# Patient Record
Sex: MALE | Race: OTHER | Employment: OTHER | URBAN - METROPOLITAN AREA
[De-identification: names, ages, dates, MRNs, and addresses within clinical notes are randomized per-mention and may not be internally consistent; named-entity substitution may affect disease eponyms.]

---

## 2024-01-08 ENCOUNTER — HOSPITAL ENCOUNTER (INPATIENT)
Age: 69
LOS: 22 days | Discharge: NURSING, REHAB OR LONG-TERM CARE | DRG: 871 | End: 2024-01-30
Attending: EMERGENCY MEDICINE | Admitting: INTERNAL MEDICINE
Payer: OTHER GOVERNMENT

## 2024-01-08 ENCOUNTER — APPOINTMENT (OUTPATIENT)
Dept: GENERAL RADIOLOGY | Age: 69
DRG: 871 | End: 2024-01-08
Payer: OTHER GOVERNMENT

## 2024-01-08 ENCOUNTER — APPOINTMENT (OUTPATIENT)
Dept: CT IMAGING | Age: 69
DRG: 871 | End: 2024-01-08
Payer: OTHER GOVERNMENT

## 2024-01-08 DIAGNOSIS — G93.41 ACUTE METABOLIC ENCEPHALOPATHY: Primary | ICD-10-CM

## 2024-01-08 DIAGNOSIS — J10.1 INFLUENZA A: ICD-10-CM

## 2024-01-08 DIAGNOSIS — N39.0 URINARY TRACT INFECTION WITHOUT HEMATURIA, SITE UNSPECIFIED: ICD-10-CM

## 2024-01-08 DIAGNOSIS — R11.2 NAUSEA AND VOMITING, UNSPECIFIED VOMITING TYPE: ICD-10-CM

## 2024-01-08 PROBLEM — R41.82 ALTERED MENTAL STATUS: Status: ACTIVE | Noted: 2024-01-08

## 2024-01-08 LAB
ALBUMIN SERPL-MCNC: 3.8 G/DL (ref 3.5–5.2)
ALP SERPL-CCNC: 165 U/L (ref 40–129)
ALT SERPL-CCNC: 16 U/L (ref 0–40)
ANION GAP SERPL CALCULATED.3IONS-SCNC: 18 MMOL/L (ref 7–16)
ANION GAP SERPL CALCULATED.3IONS-SCNC: 9 MMOL/L (ref 7–16)
AST SERPL-CCNC: 24 U/L (ref 0–39)
B.E.: -0.5 MMOL/L (ref -3–3)
BACTERIA URNS QL MICRO: ABNORMAL
BASOPHILS # BLD: 0.02 K/UL (ref 0–0.2)
BASOPHILS NFR BLD: 0 % (ref 0–2)
BILIRUB DIRECT SERPL-MCNC: <0.2 MG/DL (ref 0–0.3)
BILIRUB INDIRECT SERPL-MCNC: ABNORMAL MG/DL (ref 0–1)
BILIRUB SERPL-MCNC: 0.3 MG/DL (ref 0–1.2)
BILIRUB UR QL STRIP: NEGATIVE
BUN SERPL-MCNC: 13 MG/DL (ref 6–23)
BUN SERPL-MCNC: 18 MG/DL (ref 6–23)
CALCIUM SERPL-MCNC: 8 MG/DL (ref 8.6–10.2)
CALCIUM SERPL-MCNC: 8.7 MG/DL (ref 8.6–10.2)
CHLORIDE SERPL-SCNC: 105 MMOL/L (ref 98–107)
CHLORIDE SERPL-SCNC: 98 MMOL/L (ref 98–107)
CLARITY UR: ABNORMAL
CO2 SERPL-SCNC: 19 MMOL/L (ref 22–29)
CO2 SERPL-SCNC: 23 MMOL/L (ref 22–29)
COHB: 0.9 % (ref 0–1.5)
COLOR UR: YELLOW
CREAT SERPL-MCNC: 1 MG/DL (ref 0.7–1.2)
CREAT SERPL-MCNC: 1.5 MG/DL (ref 0.7–1.2)
CRITICAL: ABNORMAL
CRP SERPL HS-MCNC: 67 MG/L (ref 0–5)
DATE ANALYZED: ABNORMAL
DATE OF COLLECTION: ABNORMAL
EKG ATRIAL RATE: 107 BPM
EKG P AXIS: 38 DEGREES
EKG P-R INTERVAL: 138 MS
EKG Q-T INTERVAL: 314 MS
EKG QRS DURATION: 72 MS
EKG QTC CALCULATION (BAZETT): 419 MS
EKG R AXIS: 37 DEGREES
EKG T AXIS: 21 DEGREES
EKG VENTRICULAR RATE: 107 BPM
EOSINOPHIL # BLD: 0.01 K/UL (ref 0.05–0.5)
EOSINOPHILS RELATIVE PERCENT: 0 % (ref 0–6)
ERYTHROCYTE [DISTWIDTH] IN BLOOD BY AUTOMATED COUNT: 13.7 % (ref 11.5–15)
GFR SERPL CREATININE-BSD FRML MDRD: 51 ML/MIN/1.73M2
GFR SERPL CREATININE-BSD FRML MDRD: >60 ML/MIN/1.73M2
GLUCOSE BLD-MCNC: 115 MG/DL (ref 74–99)
GLUCOSE BLD-MCNC: 131 MG/DL (ref 74–99)
GLUCOSE BLD-MCNC: 132 MG/DL (ref 74–99)
GLUCOSE BLD-MCNC: 154 MG/DL (ref 74–99)
GLUCOSE SERPL-MCNC: 126 MG/DL (ref 74–99)
GLUCOSE SERPL-MCNC: 166 MG/DL (ref 74–99)
GLUCOSE UR STRIP-MCNC: NEGATIVE MG/DL
HBA1C MFR BLD: 6.3 % (ref 4–5.6)
HCO3: 21.7 MMOL/L (ref 22–26)
HCT VFR BLD AUTO: 43 % (ref 37–54)
HGB BLD-MCNC: 14.7 G/DL (ref 12.5–16.5)
HGB UR QL STRIP.AUTO: ABNORMAL
HHB: 5.4 % (ref 0–5)
IMM GRANULOCYTES # BLD AUTO: 0.03 K/UL (ref 0–0.58)
IMM GRANULOCYTES NFR BLD: 0 % (ref 0–5)
INFLUENZA A BY PCR: DETECTED
INFLUENZA B BY PCR: NOT DETECTED
KETONES UR STRIP-MCNC: 15 MG/DL
LAB: ABNORMAL
LACTATE BLDV-SCNC: 2 MMOL/L (ref 0.5–1.9)
LACTATE BLDV-SCNC: 4.8 MMOL/L (ref 0.5–1.9)
LEUKOCYTE ESTERASE UR QL STRIP: ABNORMAL
LIPASE SERPL-CCNC: 23 U/L (ref 13–60)
LYMPHOCYTES NFR BLD: 0.84 K/UL (ref 1.5–4)
LYMPHOCYTES RELATIVE PERCENT: 10 % (ref 20–42)
Lab: 212
MCH RBC QN AUTO: 31.3 PG (ref 26–35)
MCHC RBC AUTO-ENTMCNC: 34.2 G/DL (ref 32–34.5)
MCV RBC AUTO: 91.7 FL (ref 80–99.9)
METHB: 0.1 % (ref 0–1.5)
MODE: ABNORMAL
MONOCYTES NFR BLD: 0.86 K/UL (ref 0.1–0.95)
MONOCYTES NFR BLD: 10 % (ref 2–12)
NEUTROPHILS NFR BLD: 80 % (ref 43–80)
NEUTS SEG NFR BLD: 6.96 K/UL (ref 1.8–7.3)
NITRITE UR QL STRIP: NEGATIVE
O2 CONTENT: 19.6 ML/DL
O2 SATURATION: 94.5 % (ref 92–98.5)
O2HB: 93.6 % (ref 94–97)
OPERATOR ID: 2323
PATIENT TEMP: 37 C
PCO2: 29.6 MMHG (ref 35–45)
PH BLOOD GAS: 7.48 (ref 7.35–7.45)
PH UR STRIP: 5.5 [PH] (ref 5–9)
PLATELET # BLD AUTO: 197 K/UL (ref 130–450)
PMV BLD AUTO: 10.3 FL (ref 7–12)
PO2: 67.1 MMHG (ref 75–100)
POTASSIUM SERPL-SCNC: 3.7 MMOL/L (ref 3.5–5)
POTASSIUM SERPL-SCNC: 3.8 MMOL/L (ref 3.5–5)
PROCALCITONIN SERPL-MCNC: 0.16 NG/ML (ref 0–0.08)
PROT SERPL-MCNC: 6.5 G/DL (ref 6.4–8.3)
PROT UR STRIP-MCNC: 100 MG/DL
RBC # BLD AUTO: 4.69 M/UL (ref 3.8–5.8)
RBC #/AREA URNS HPF: ABNORMAL /HPF
SARS-COV-2 RDRP RESP QL NAA+PROBE: NOT DETECTED
SODIUM SERPL-SCNC: 135 MMOL/L (ref 132–146)
SODIUM SERPL-SCNC: 137 MMOL/L (ref 132–146)
SOURCE, BLOOD GAS: ABNORMAL
SP GR UR STRIP: 1.02 (ref 1–1.03)
SPECIMEN DESCRIPTION: NORMAL
THB: 14.9 G/DL (ref 11.5–16.5)
TIME ANALYZED: 216
TROPONIN I SERPL HS-MCNC: 11 NG/L (ref 0–11)
TROPONIN I SERPL HS-MCNC: 13 NG/L (ref 0–11)
UROBILINOGEN UR STRIP-ACNC: 0.2 EU/DL (ref 0–1)
WBC #/AREA URNS HPF: ABNORMAL /HPF
WBC OTHER # BLD: 8.7 K/UL (ref 4.5–11.5)

## 2024-01-08 PROCEDURE — 86140 C-REACTIVE PROTEIN: CPT

## 2024-01-08 PROCEDURE — 94640 AIRWAY INHALATION TREATMENT: CPT

## 2024-01-08 PROCEDURE — 99285 EMERGENCY DEPT VISIT HI MDM: CPT

## 2024-01-08 PROCEDURE — 6370000000 HC RX 637 (ALT 250 FOR IP)

## 2024-01-08 PROCEDURE — 83690 ASSAY OF LIPASE: CPT

## 2024-01-08 PROCEDURE — 82805 BLOOD GASES W/O2 SATURATION: CPT

## 2024-01-08 PROCEDURE — 81001 URINALYSIS AUTO W/SCOPE: CPT

## 2024-01-08 PROCEDURE — 96374 THER/PROPH/DIAG INJ IV PUSH: CPT

## 2024-01-08 PROCEDURE — 6370000000 HC RX 637 (ALT 250 FOR IP): Performed by: INTERNAL MEDICINE

## 2024-01-08 PROCEDURE — 6360000002 HC RX W HCPCS: Performed by: INTERNAL MEDICINE

## 2024-01-08 PROCEDURE — 87086 URINE CULTURE/COLONY COUNT: CPT

## 2024-01-08 PROCEDURE — 93010 ELECTROCARDIOGRAM REPORT: CPT | Performed by: INTERNAL MEDICINE

## 2024-01-08 PROCEDURE — 2580000003 HC RX 258: Performed by: STUDENT IN AN ORGANIZED HEALTH CARE EDUCATION/TRAINING PROGRAM

## 2024-01-08 PROCEDURE — 85025 COMPLETE CBC W/AUTO DIFF WBC: CPT

## 2024-01-08 PROCEDURE — 96361 HYDRATE IV INFUSION ADD-ON: CPT

## 2024-01-08 PROCEDURE — 82248 BILIRUBIN DIRECT: CPT

## 2024-01-08 PROCEDURE — 83605 ASSAY OF LACTIC ACID: CPT

## 2024-01-08 PROCEDURE — 2060000000 HC ICU INTERMEDIATE R&B

## 2024-01-08 PROCEDURE — 36415 COLL VENOUS BLD VENIPUNCTURE: CPT

## 2024-01-08 PROCEDURE — 2700000000 HC OXYGEN THERAPY PER DAY

## 2024-01-08 PROCEDURE — 70450 CT HEAD/BRAIN W/O DYE: CPT

## 2024-01-08 PROCEDURE — 82962 GLUCOSE BLOOD TEST: CPT

## 2024-01-08 PROCEDURE — 80048 BASIC METABOLIC PNL TOTAL CA: CPT

## 2024-01-08 PROCEDURE — 87502 INFLUENZA DNA AMP PROBE: CPT

## 2024-01-08 PROCEDURE — 80053 COMPREHEN METABOLIC PANEL: CPT

## 2024-01-08 PROCEDURE — 84484 ASSAY OF TROPONIN QUANT: CPT

## 2024-01-08 PROCEDURE — 6370000000 HC RX 637 (ALT 250 FOR IP): Performed by: STUDENT IN AN ORGANIZED HEALTH CARE EDUCATION/TRAINING PROGRAM

## 2024-01-08 PROCEDURE — 51702 INSERT TEMP BLADDER CATH: CPT

## 2024-01-08 PROCEDURE — 87635 SARS-COV-2 COVID-19 AMP PRB: CPT

## 2024-01-08 PROCEDURE — 83036 HEMOGLOBIN GLYCOSYLATED A1C: CPT

## 2024-01-08 PROCEDURE — 84145 PROCALCITONIN (PCT): CPT

## 2024-01-08 PROCEDURE — 6360000002 HC RX W HCPCS: Performed by: STUDENT IN AN ORGANIZED HEALTH CARE EDUCATION/TRAINING PROGRAM

## 2024-01-08 PROCEDURE — 87040 BLOOD CULTURE FOR BACTERIA: CPT

## 2024-01-08 PROCEDURE — 93005 ELECTROCARDIOGRAM TRACING: CPT | Performed by: STUDENT IN AN ORGANIZED HEALTH CARE EDUCATION/TRAINING PROGRAM

## 2024-01-08 PROCEDURE — 71045 X-RAY EXAM CHEST 1 VIEW: CPT

## 2024-01-08 RX ORDER — CLONAZEPAM 0.5 MG/1
0.5 TABLET ORAL EVERY MORNING
Status: DISCONTINUED | OUTPATIENT
Start: 2024-01-09 | End: 2024-01-16 | Stop reason: SDUPTHER

## 2024-01-08 RX ORDER — PROCHLORPERAZINE EDISYLATE 5 MG/ML
10 INJECTION INTRAMUSCULAR; INTRAVENOUS EVERY 6 HOURS PRN
Status: DISCONTINUED | OUTPATIENT
Start: 2024-01-08 | End: 2024-01-25

## 2024-01-08 RX ORDER — ACETAMINOPHEN 325 MG/1
650 TABLET ORAL EVERY 4 HOURS PRN
Status: DISCONTINUED | OUTPATIENT
Start: 2024-01-08 | End: 2024-01-08 | Stop reason: SDUPTHER

## 2024-01-08 RX ORDER — BENZTROPINE MESYLATE 2 MG/1
2 TABLET ORAL 2 TIMES DAILY
COMMUNITY

## 2024-01-08 RX ORDER — ONDANSETRON 4 MG/1
4 TABLET, ORALLY DISINTEGRATING ORAL EVERY 8 HOURS PRN
Status: DISCONTINUED | OUTPATIENT
Start: 2024-01-08 | End: 2024-01-31 | Stop reason: HOSPADM

## 2024-01-08 RX ORDER — CLONAZEPAM 0.5 MG/1
0.5 TABLET ORAL SEE ADMIN INSTRUCTIONS
COMMUNITY

## 2024-01-08 RX ORDER — ACETAMINOPHEN 650 MG/1
650 SUPPOSITORY RECTAL ONCE
Status: COMPLETED | OUTPATIENT
Start: 2024-01-08 | End: 2024-01-08

## 2024-01-08 RX ORDER — SODIUM CHLORIDE 0.9 % (FLUSH) 0.9 %
5-40 SYRINGE (ML) INJECTION EVERY 12 HOURS SCHEDULED
Status: DISCONTINUED | OUTPATIENT
Start: 2024-01-08 | End: 2024-01-31 | Stop reason: HOSPADM

## 2024-01-08 RX ORDER — SODIUM CHLORIDE 0.9 % (FLUSH) 0.9 %
5-40 SYRINGE (ML) INJECTION PRN
Status: DISCONTINUED | OUTPATIENT
Start: 2024-01-08 | End: 2024-01-31 | Stop reason: HOSPADM

## 2024-01-08 RX ORDER — ONDANSETRON 2 MG/ML
4 INJECTION INTRAMUSCULAR; INTRAVENOUS EVERY 6 HOURS PRN
Status: DISCONTINUED | OUTPATIENT
Start: 2024-01-08 | End: 2024-01-31 | Stop reason: HOSPADM

## 2024-01-08 RX ORDER — SODIUM CHLORIDE 9 MG/ML
INJECTION, SOLUTION INTRAVENOUS PRN
Status: DISCONTINUED | OUTPATIENT
Start: 2024-01-08 | End: 2024-01-08 | Stop reason: SDUPTHER

## 2024-01-08 RX ORDER — SODIUM CHLORIDE AND POTASSIUM CHLORIDE 150; 900 MG/100ML; MG/100ML
INJECTION, SOLUTION INTRAVENOUS CONTINUOUS
Status: DISCONTINUED | OUTPATIENT
Start: 2024-01-08 | End: 2024-01-11

## 2024-01-08 RX ORDER — CLOZAPINE 100 MG/1
100 TABLET ORAL 2 TIMES DAILY
Status: DISCONTINUED | OUTPATIENT
Start: 2024-01-08 | End: 2024-01-08

## 2024-01-08 RX ORDER — BENZTROPINE MESYLATE 1 MG/1
2 TABLET ORAL 2 TIMES DAILY
Status: DISCONTINUED | OUTPATIENT
Start: 2024-01-08 | End: 2024-01-31 | Stop reason: HOSPADM

## 2024-01-08 RX ORDER — ACETAMINOPHEN 500 MG
500 TABLET ORAL EVERY 6 HOURS PRN
Status: DISCONTINUED | OUTPATIENT
Start: 2024-01-08 | End: 2024-01-31 | Stop reason: HOSPADM

## 2024-01-08 RX ORDER — IPRATROPIUM BROMIDE AND ALBUTEROL SULFATE 2.5; .5 MG/3ML; MG/3ML
1 SOLUTION RESPIRATORY (INHALATION)
Status: DISCONTINUED | OUTPATIENT
Start: 2024-01-08 | End: 2024-01-12

## 2024-01-08 RX ORDER — CLOZAPINE 100 MG/1
100 TABLET ORAL EVERY MORNING
Status: DISCONTINUED | OUTPATIENT
Start: 2024-01-09 | End: 2024-01-31 | Stop reason: HOSPADM

## 2024-01-08 RX ORDER — SODIUM CHLORIDE 9 MG/ML
INJECTION, SOLUTION INTRAVENOUS PRN
Status: DISCONTINUED | OUTPATIENT
Start: 2024-01-08 | End: 2024-01-31 | Stop reason: HOSPADM

## 2024-01-08 RX ORDER — INSULIN LISPRO 100 [IU]/ML
0-4 INJECTION, SOLUTION INTRAVENOUS; SUBCUTANEOUS NIGHTLY
Status: DISCONTINUED | OUTPATIENT
Start: 2024-01-08 | End: 2024-01-31 | Stop reason: HOSPADM

## 2024-01-08 RX ORDER — CLONAZEPAM 1 MG/1
0.5 TABLET ORAL 3 TIMES DAILY
Status: DISCONTINUED | OUTPATIENT
Start: 2024-01-08 | End: 2024-01-08

## 2024-01-08 RX ORDER — ENOXAPARIN SODIUM 100 MG/ML
40 INJECTION SUBCUTANEOUS DAILY
Status: DISCONTINUED | OUTPATIENT
Start: 2024-01-08 | End: 2024-01-31 | Stop reason: HOSPADM

## 2024-01-08 RX ORDER — CLONAZEPAM 0.5 MG/1
0.25 TABLET ORAL DAILY
Status: DISCONTINUED | OUTPATIENT
Start: 2024-01-09 | End: 2024-01-16 | Stop reason: SDUPTHER

## 2024-01-08 RX ORDER — BUDESONIDE 0.5 MG/2ML
500 INHALANT ORAL
Status: DISCONTINUED | OUTPATIENT
Start: 2024-01-08 | End: 2024-01-31 | Stop reason: HOSPADM

## 2024-01-08 RX ORDER — ACETAMINOPHEN 650 MG/1
SUPPOSITORY RECTAL
Status: COMPLETED
Start: 2024-01-08 | End: 2024-01-08

## 2024-01-08 RX ORDER — POLYETHYLENE GLYCOL 3350 17 G/17G
17 POWDER, FOR SOLUTION ORAL DAILY PRN
Status: DISCONTINUED | OUTPATIENT
Start: 2024-01-08 | End: 2024-01-31 | Stop reason: HOSPADM

## 2024-01-08 RX ORDER — 0.9 % SODIUM CHLORIDE 0.9 %
2000 INTRAVENOUS SOLUTION INTRAVENOUS ONCE
Status: COMPLETED | OUTPATIENT
Start: 2024-01-08 | End: 2024-01-08

## 2024-01-08 RX ORDER — CLOZAPINE 100 MG/1
200 TABLET ORAL NIGHTLY
Status: DISCONTINUED | OUTPATIENT
Start: 2024-01-08 | End: 2024-01-31 | Stop reason: HOSPADM

## 2024-01-08 RX ORDER — GLUCAGON 1 MG/ML
1 KIT INJECTION PRN
Status: DISCONTINUED | OUTPATIENT
Start: 2024-01-08 | End: 2024-01-31 | Stop reason: HOSPADM

## 2024-01-08 RX ORDER — INSULIN LISPRO 100 [IU]/ML
0-8 INJECTION, SOLUTION INTRAVENOUS; SUBCUTANEOUS
Status: DISCONTINUED | OUTPATIENT
Start: 2024-01-08 | End: 2024-01-31 | Stop reason: HOSPADM

## 2024-01-08 RX ORDER — CLOZAPINE 100 MG/1
100 TABLET ORAL SEE ADMIN INSTRUCTIONS
COMMUNITY

## 2024-01-08 RX ORDER — DEXTROSE MONOHYDRATE 100 MG/ML
INJECTION, SOLUTION INTRAVENOUS CONTINUOUS PRN
Status: DISCONTINUED | OUTPATIENT
Start: 2024-01-08 | End: 2024-01-31 | Stop reason: HOSPADM

## 2024-01-08 RX ORDER — ACETAMINOPHEN 650 MG/1
650 SUPPOSITORY RECTAL EVERY 6 HOURS PRN
Status: COMPLETED | OUTPATIENT
Start: 2024-01-08 | End: 2024-01-11

## 2024-01-08 RX ADMIN — WATER 1000 MG: 1 INJECTION INTRAMUSCULAR; INTRAVENOUS; SUBCUTANEOUS at 03:09

## 2024-01-08 RX ADMIN — POTASSIUM CHLORIDE AND SODIUM CHLORIDE: 900; 150 INJECTION, SOLUTION INTRAVENOUS at 15:04

## 2024-01-08 RX ADMIN — BUDESONIDE INHALATION 500 MCG: 0.5 SUSPENSION RESPIRATORY (INHALATION) at 20:21

## 2024-01-08 RX ADMIN — BENZTROPINE MESYLATE 2 MG: 1 TABLET ORAL at 21:15

## 2024-01-08 RX ADMIN — BENZTROPINE MESYLATE 2 MG: 1 TABLET ORAL at 15:05

## 2024-01-08 RX ADMIN — Medication 10 ML: at 21:20

## 2024-01-08 RX ADMIN — CLONAZEPAM 1.5 MG: 0.5 TABLET ORAL at 18:52

## 2024-01-08 RX ADMIN — IPRATROPIUM BROMIDE AND ALBUTEROL SULFATE 1 DOSE: .5; 2.5 SOLUTION RESPIRATORY (INHALATION) at 16:34

## 2024-01-08 RX ADMIN — IPRATROPIUM BROMIDE AND ALBUTEROL SULFATE 1 DOSE: .5; 2.5 SOLUTION RESPIRATORY (INHALATION) at 10:39

## 2024-01-08 RX ADMIN — CLOZAPINE 200 MG: 100 TABLET ORAL at 22:41

## 2024-01-08 RX ADMIN — ACETAMINOPHEN 650 MG: 650 SUPPOSITORY RECTAL at 03:09

## 2024-01-08 RX ADMIN — IPRATROPIUM BROMIDE AND ALBUTEROL SULFATE 1 DOSE: .5; 2.5 SOLUTION RESPIRATORY (INHALATION) at 20:21

## 2024-01-08 RX ADMIN — ACETAMINOPHEN 650 MG: 650 SUPPOSITORY RECTAL at 07:53

## 2024-01-08 RX ADMIN — ENOXAPARIN SODIUM 40 MG: 100 INJECTION SUBCUTANEOUS at 14:58

## 2024-01-08 RX ADMIN — BUDESONIDE INHALATION 500 MCG: 0.5 SUSPENSION RESPIRATORY (INHALATION) at 10:39

## 2024-01-08 RX ADMIN — SODIUM CHLORIDE 2000 ML: 9 INJECTION, SOLUTION INTRAVENOUS at 02:12

## 2024-01-08 RX ADMIN — POTASSIUM CHLORIDE AND SODIUM CHLORIDE: 900; 150 INJECTION, SOLUTION INTRAVENOUS at 23:40

## 2024-01-08 ASSESSMENT — PAIN - FUNCTIONAL ASSESSMENT: PAIN_FUNCTIONAL_ASSESSMENT: NONE - DENIES PAIN

## 2024-01-08 NOTE — H&P
Department of Internal Medicine        CHIEF COMPLAINT: Altered mental status    Reason for Admission: Acute metabolic encephalopathy with UTI    HISTORY OF PRESENT ILLNESS:      The patient is a 68 y.o. male who presents with altered mental status.  Patient according to family is usually able to get around by himself with family has noticed lately he is shuffling his feet and not able to walk as easily.  Family denies any falls or head trauma.  Family states patient denied any pain or problems just prior to this.  Paramedics were called and patient was found to be hypoxic with O2 sat 85%.  Patient does not have home O2.  In the ED the patient was noted to have elevated creatinine 1.5 with WBC 8.7 hemoglobin 14.7.  Patient was positive for influenza A with screening.  Urinalysis showed 21-50 WBCs and +3 bacteria and large leukocyte esterase.  Temperature was 99.5 initially heart rate 91 and blood pressure currently 118/68.  Patient's O2 sat is 96% on 3 L currently.  Chest x-ray and CT of the head without contrast were both unremarkable.  Currently the patient has his eyes closed and resists opening when trying to check the pupils.  When doing physical exam trying to move his arms and legs he would slap at me with his right arm.    Past Medical History:    History reviewed. No pertinent past medical history.  Past Surgical History:    History reviewed. No pertinent surgical history.    Medications Prior to Admission:    @  Prior to Admission medications    Medication Sig Start Date End Date Taking? Authorizing Provider   metFORMIN (GLUCOPHAGE) 500 MG tablet Take 1 tablet by mouth daily With dinner   Yes Leonardo Vences MD   clonazePAM (KLONOPIN) 0.5 MG tablet Take 1 tablet by mouth 3 times daily. One half tab in morning, one half tab at 2pm, 3 tablets at bedtime Max Daily Amount: 1.5 mg   Yes Leonardo Vences MD   benztropine (COGENTIN) 2 MG tablet Take 1 tablet by mouth 2 times daily One in morning one at

## 2024-01-08 NOTE — ED PROVIDER NOTES
[facility] EMERGENCY DEPARTMENT  EMERGENCY DEPARTMENT ENCOUNTER      Pt Name: Aureliano Falcon  MRN: [id number]  Birthdate 1955  Date of evaluation: 1/8/2024  Provider: Caesar Landry DO  PCP: Toshia Perea APRN - CNP  Note Started: 2:03 AM EST 1/8/24    CHIEF COMPLAINT       Chief Complaint   Patient presents with    Altered Mental Status       HISTORY OF PRESENT ILLNESS: 1 or more Elements   History From: EMS and patient's family  Limitations to history : Altered Mental Status and nonverbal    Aureliano Falcon is a 68 y.o. male who presents to the ED due to altered mental status.  Patient is nonverbal at baseline but reportedly has been less mobile than he typically is per family.  Patient is usually able to get around by himself but family states that he has been shuffling and not able to walk as easily as he typically does.  They deny any falls or trauma to the head.  They deny the patient has been complaining of any pain recently.  Denies any other symptoms at this time.  Patient was found to be hypoxic on room air upon arrival at 85% and placed on 3 L via nasal cannula.  He is not on any home oxygen.  History is limited given patient's nonverbal status.    Nursing Notes were all reviewed and agreed with or any disagreements were addressed in the HPI.    REVIEW OF SYSTEMS :    Positives and Pertinent negatives as per HPI.     PAST MEDICAL HISTORY/Chronic Conditions Affecting Care    has no past medical history on file.     SURGICAL HISTORY   History reviewed. No pertinent surgical history.    CURRENTMEDICATIONS       Previous Medications    BENZTROPINE (COGENTIN) 2 MG TABLET    Take 1 tablet by mouth 2 times daily One in morning one at night    CLONAZEPAM (KLONOPIN) 0.5 MG TABLET    Take 1 tablet by mouth 3 times daily. One half tab in morning, one half tab at 2pm, 3 tablets at bedtime Max Daily Amount: 1.5 mg    CLOZAPINE (CLOZARIL) 100 MG TABLET    Take 1 tablet by mouth 2 times daily

## 2024-01-09 LAB
ALBUMIN SERPL-MCNC: 3.1 G/DL (ref 3.5–5.2)
ALP SERPL-CCNC: 136 U/L (ref 40–129)
ALT SERPL-CCNC: 15 U/L (ref 0–40)
ANION GAP SERPL CALCULATED.3IONS-SCNC: 11 MMOL/L (ref 7–16)
AST SERPL-CCNC: 26 U/L (ref 0–39)
BASOPHILS # BLD: 0.01 K/UL (ref 0–0.2)
BASOPHILS NFR BLD: 0 % (ref 0–2)
BILIRUB SERPL-MCNC: 0.3 MG/DL (ref 0–1.2)
BUN SERPL-MCNC: 16 MG/DL (ref 6–23)
CALCIUM SERPL-MCNC: 8.1 MG/DL (ref 8.6–10.2)
CHLORIDE SERPL-SCNC: 107 MMOL/L (ref 98–107)
CHOLEST SERPL-MCNC: 101 MG/DL
CO2 SERPL-SCNC: 20 MMOL/L (ref 22–29)
CREAT SERPL-MCNC: 1 MG/DL (ref 0.7–1.2)
EOSINOPHIL # BLD: 0 K/UL (ref 0.05–0.5)
EOSINOPHILS RELATIVE PERCENT: 0 % (ref 0–6)
ERYTHROCYTE [DISTWIDTH] IN BLOOD BY AUTOMATED COUNT: 14.3 % (ref 11.5–15)
ERYTHROCYTE [SEDIMENTATION RATE] IN BLOOD BY WESTERGREN METHOD: 31 MM/HR (ref 0–15)
GFR SERPL CREATININE-BSD FRML MDRD: >60 ML/MIN/1.73M2
GLUCOSE BLD-MCNC: 115 MG/DL (ref 74–99)
GLUCOSE BLD-MCNC: 121 MG/DL (ref 74–99)
GLUCOSE BLD-MCNC: 130 MG/DL (ref 74–99)
GLUCOSE BLD-MCNC: 155 MG/DL (ref 74–99)
GLUCOSE SERPL-MCNC: 115 MG/DL (ref 74–99)
HCT VFR BLD AUTO: 41.7 % (ref 37–54)
HDLC SERPL-MCNC: 47 MG/DL
HGB BLD-MCNC: 13.7 G/DL (ref 12.5–16.5)
IMM GRANULOCYTES # BLD AUTO: 0.03 K/UL (ref 0–0.58)
IMM GRANULOCYTES NFR BLD: 0 % (ref 0–5)
LDLC SERPL CALC-MCNC: 39 MG/DL
LYMPHOCYTES NFR BLD: 0.76 K/UL (ref 1.5–4)
LYMPHOCYTES RELATIVE PERCENT: 8 % (ref 20–42)
MAGNESIUM SERPL-MCNC: 2.2 MG/DL (ref 1.6–2.6)
MCH RBC QN AUTO: 30.6 PG (ref 26–35)
MCHC RBC AUTO-ENTMCNC: 32.9 G/DL (ref 32–34.5)
MCV RBC AUTO: 93.3 FL (ref 80–99.9)
MONOCYTES NFR BLD: 0.85 K/UL (ref 0.1–0.95)
MONOCYTES NFR BLD: 9 % (ref 2–12)
NEUTROPHILS NFR BLD: 82 % (ref 43–80)
NEUTS SEG NFR BLD: 7.35 K/UL (ref 1.8–7.3)
PHOSPHATE SERPL-MCNC: 2.3 MG/DL (ref 2.5–4.5)
PLATELET # BLD AUTO: 170 K/UL (ref 130–450)
PMV BLD AUTO: 10.4 FL (ref 7–12)
POTASSIUM SERPL-SCNC: 4.1 MMOL/L (ref 3.5–5)
PROT SERPL-MCNC: 5.8 G/DL (ref 6.4–8.3)
RBC # BLD AUTO: 4.47 M/UL (ref 3.8–5.8)
SODIUM SERPL-SCNC: 138 MMOL/L (ref 132–146)
TRIGL SERPL-MCNC: 76 MG/DL
TSH SERPL DL<=0.05 MIU/L-ACNC: 3.24 UIU/ML (ref 0.27–4.2)
VLDLC SERPL CALC-MCNC: 15 MG/DL
WBC OTHER # BLD: 9 K/UL (ref 4.5–11.5)

## 2024-01-09 PROCEDURE — 6360000002 HC RX W HCPCS: Performed by: INTERNAL MEDICINE

## 2024-01-09 PROCEDURE — 85652 RBC SED RATE AUTOMATED: CPT

## 2024-01-09 PROCEDURE — 2580000003 HC RX 258: Performed by: INTERNAL MEDICINE

## 2024-01-09 PROCEDURE — 94640 AIRWAY INHALATION TREATMENT: CPT

## 2024-01-09 PROCEDURE — 80061 LIPID PANEL: CPT

## 2024-01-09 PROCEDURE — 6370000000 HC RX 637 (ALT 250 FOR IP): Performed by: INTERNAL MEDICINE

## 2024-01-09 PROCEDURE — 2060000000 HC ICU INTERMEDIATE R&B

## 2024-01-09 PROCEDURE — 85025 COMPLETE CBC W/AUTO DIFF WBC: CPT

## 2024-01-09 PROCEDURE — 80053 COMPREHEN METABOLIC PANEL: CPT

## 2024-01-09 PROCEDURE — 97161 PT EVAL LOW COMPLEX 20 MIN: CPT | Performed by: PHYSICAL THERAPIST

## 2024-01-09 PROCEDURE — 84443 ASSAY THYROID STIM HORMONE: CPT

## 2024-01-09 PROCEDURE — 36415 COLL VENOUS BLD VENIPUNCTURE: CPT

## 2024-01-09 PROCEDURE — 84100 ASSAY OF PHOSPHORUS: CPT

## 2024-01-09 PROCEDURE — 82962 GLUCOSE BLOOD TEST: CPT

## 2024-01-09 PROCEDURE — 2580000003 HC RX 258: Performed by: STUDENT IN AN ORGANIZED HEALTH CARE EDUCATION/TRAINING PROGRAM

## 2024-01-09 PROCEDURE — 2700000000 HC OXYGEN THERAPY PER DAY

## 2024-01-09 PROCEDURE — 83735 ASSAY OF MAGNESIUM: CPT

## 2024-01-09 PROCEDURE — 2500000003 HC RX 250 WO HCPCS: Performed by: INTERNAL MEDICINE

## 2024-01-09 RX ORDER — ZIPRASIDONE MESYLATE 20 MG/ML
5 INJECTION, POWDER, LYOPHILIZED, FOR SOLUTION INTRAMUSCULAR ONCE
Status: COMPLETED | OUTPATIENT
Start: 2024-01-10 | End: 2024-01-10

## 2024-01-09 RX ADMIN — IPRATROPIUM BROMIDE AND ALBUTEROL SULFATE 1 DOSE: .5; 2.5 SOLUTION RESPIRATORY (INHALATION) at 14:59

## 2024-01-09 RX ADMIN — BENZTROPINE MESYLATE 2 MG: 1 TABLET ORAL at 21:05

## 2024-01-09 RX ADMIN — CLOZAPINE 200 MG: 100 TABLET ORAL at 21:06

## 2024-01-09 RX ADMIN — Medication 10 ML: at 19:55

## 2024-01-09 RX ADMIN — IPRATROPIUM BROMIDE AND ALBUTEROL SULFATE 1 DOSE: .5; 2.5 SOLUTION RESPIRATORY (INHALATION) at 07:11

## 2024-01-09 RX ADMIN — BENZTROPINE MESYLATE 2 MG: 1 TABLET ORAL at 09:25

## 2024-01-09 RX ADMIN — DOXYCYCLINE 100 MG: 100 INJECTION, POWDER, LYOPHILIZED, FOR SOLUTION INTRAVENOUS at 17:04

## 2024-01-09 RX ADMIN — CLONAZEPAM 0.25 MG: 0.5 TABLET ORAL at 14:21

## 2024-01-09 RX ADMIN — ACETAMINOPHEN 500 MG: 500 TABLET ORAL at 11:57

## 2024-01-09 RX ADMIN — POTASSIUM CHLORIDE AND SODIUM CHLORIDE: 900; 150 INJECTION, SOLUTION INTRAVENOUS at 19:55

## 2024-01-09 RX ADMIN — CLOZAPINE 100 MG: 100 TABLET ORAL at 09:25

## 2024-01-09 RX ADMIN — IPRATROPIUM BROMIDE AND ALBUTEROL SULFATE 1 DOSE: .5; 2.5 SOLUTION RESPIRATORY (INHALATION) at 10:19

## 2024-01-09 RX ADMIN — WATER 1000 MG: 1 INJECTION INTRAMUSCULAR; INTRAVENOUS; SUBCUTANEOUS at 02:05

## 2024-01-09 RX ADMIN — BUDESONIDE INHALATION 500 MCG: 0.5 SUSPENSION RESPIRATORY (INHALATION) at 19:45

## 2024-01-09 RX ADMIN — CLONAZEPAM 1.5 MG: 0.5 TABLET ORAL at 21:06

## 2024-01-09 RX ADMIN — ONDANSETRON 4 MG: 2 INJECTION INTRAMUSCULAR; INTRAVENOUS at 21:05

## 2024-01-09 RX ADMIN — Medication 10 ML: at 21:07

## 2024-01-09 RX ADMIN — CLONAZEPAM 0.5 MG: 0.5 TABLET ORAL at 09:26

## 2024-01-09 RX ADMIN — BUDESONIDE INHALATION 500 MCG: 0.5 SUSPENSION RESPIRATORY (INHALATION) at 07:11

## 2024-01-09 RX ADMIN — IPRATROPIUM BROMIDE AND ALBUTEROL SULFATE 1 DOSE: .5; 2.5 SOLUTION RESPIRATORY (INHALATION) at 19:45

## 2024-01-09 RX ADMIN — ENOXAPARIN SODIUM 40 MG: 100 INJECTION SUBCUTANEOUS at 09:25

## 2024-01-09 RX ADMIN — POTASSIUM CHLORIDE AND SODIUM CHLORIDE: 900; 150 INJECTION, SOLUTION INTRAVENOUS at 09:31

## 2024-01-09 NOTE — CARE COORDINATION
Case Management Assessment  Initial Evaluation    Date/Time of Evaluation: 1/9/2024 4:20 PM  Assessment Completed by: KVNG Castano    If patient is discharged prior to next notation, then this note serves as note for discharge by case management.    Patient Name: Aureliano Falcon                   YOB: 1955  Diagnosis: Altered mental status [R41.82]  Influenza A [J10.1]  Urinary tract infection without hematuria, site unspecified [N39.0]  Acute metabolic encephalopathy [G93.41]                   Date / Time: 1/8/2024  1:43 AM    Patient Admission Status: Inpatient   Readmission Risk (Low < 19, Mod (19-27), High > 27): Readmission Risk Score: 10.7    Current PCP: Toshia Perea APRN - CNP  PCP verified by CM? Yes    Chart Reviewed: Yes      History Provided by: Child/Family, Other (see comment) (brother, Tobi)  Patient Orientation: Unable to Assess    Patient Cognition: Severely Impaired    Hospitalization in the last 30 days (Readmission):  No    If yes, Readmission Assessment in CM Navigator will be completed.    Advance Directives:      Code Status: Full Code   Patient's Primary Decision Maker is: Legal Next of Kin      Discharge Planning:    Patient lives with: Family Members Type of Home: House  Primary Care Giver: Family  Patient Support Systems include: Parent, Family Members   Current Financial resources:    Current community resources:    Current services prior to admission: None            Current DME:              Type of Home Care services:  None    ADLS  Prior functional level: Assistance with the following:, Bathing, Dressing, Toileting, Cooking, Housework, Mobility, Shopping  Current functional level: Mobility, Shopping, Housework, Cooking, Toileting, Bathing, Dressing, Assistance with the following:    PT AM-PAC: 12 /24  OT AM-PAC:   /24    Family can provide assistance at DC: Yes  Would you like Case Management to discuss the discharge plan with any other family

## 2024-01-10 ENCOUNTER — APPOINTMENT (OUTPATIENT)
Dept: CT IMAGING | Age: 69
DRG: 871 | End: 2024-01-10
Payer: OTHER GOVERNMENT

## 2024-01-10 LAB
ALBUMIN SERPL-MCNC: 3.4 G/DL (ref 3.5–5.2)
ALP SERPL-CCNC: 135 U/L (ref 40–129)
ALT SERPL-CCNC: 16 U/L (ref 0–40)
ANION GAP SERPL CALCULATED.3IONS-SCNC: 12 MMOL/L (ref 7–16)
AST SERPL-CCNC: 33 U/L (ref 0–39)
BASOPHILS # BLD: 0.01 K/UL (ref 0–0.2)
BASOPHILS NFR BLD: 0 % (ref 0–2)
BILIRUB SERPL-MCNC: 0.4 MG/DL (ref 0–1.2)
BNP SERPL-MCNC: 120 PG/ML (ref 0–450)
BUN SERPL-MCNC: 21 MG/DL (ref 6–23)
CALCIUM SERPL-MCNC: 8.6 MG/DL (ref 8.6–10.2)
CHLORIDE SERPL-SCNC: 107 MMOL/L (ref 98–107)
CO2 SERPL-SCNC: 21 MMOL/L (ref 22–29)
CREAT SERPL-MCNC: 1.1 MG/DL (ref 0.7–1.2)
EOSINOPHIL # BLD: 0 K/UL (ref 0.05–0.5)
EOSINOPHILS RELATIVE PERCENT: 0 % (ref 0–6)
ERYTHROCYTE [DISTWIDTH] IN BLOOD BY AUTOMATED COUNT: 14.3 % (ref 11.5–15)
GFR SERPL CREATININE-BSD FRML MDRD: >60 ML/MIN/1.73M2
GLUCOSE BLD-MCNC: 100 MG/DL (ref 74–99)
GLUCOSE BLD-MCNC: 100 MG/DL (ref 74–99)
GLUCOSE BLD-MCNC: 114 MG/DL (ref 74–99)
GLUCOSE BLD-MCNC: 125 MG/DL (ref 74–99)
GLUCOSE SERPL-MCNC: 120 MG/DL (ref 74–99)
HCT VFR BLD AUTO: 43.4 % (ref 37–54)
HGB BLD-MCNC: 14.4 G/DL (ref 12.5–16.5)
IMM GRANULOCYTES # BLD AUTO: 0.03 K/UL (ref 0–0.58)
IMM GRANULOCYTES NFR BLD: 0 % (ref 0–5)
LYMPHOCYTES NFR BLD: 0.62 K/UL (ref 1.5–4)
LYMPHOCYTES RELATIVE PERCENT: 6 % (ref 20–42)
MCH RBC QN AUTO: 30.4 PG (ref 26–35)
MCHC RBC AUTO-ENTMCNC: 33.2 G/DL (ref 32–34.5)
MCV RBC AUTO: 91.8 FL (ref 80–99.9)
MICROORGANISM SPEC CULT: NO GROWTH
MONOCYTES NFR BLD: 0.57 K/UL (ref 0.1–0.95)
MONOCYTES NFR BLD: 6 % (ref 2–12)
NEUTROPHILS NFR BLD: 87 % (ref 43–80)
NEUTS SEG NFR BLD: 8.48 K/UL (ref 1.8–7.3)
PLATELET # BLD AUTO: 165 K/UL (ref 130–450)
PMV BLD AUTO: 10.7 FL (ref 7–12)
POTASSIUM SERPL-SCNC: 4.4 MMOL/L (ref 3.5–5)
PROT SERPL-MCNC: 6.3 G/DL (ref 6.4–8.3)
RBC # BLD AUTO: 4.73 M/UL (ref 3.8–5.8)
SODIUM SERPL-SCNC: 140 MMOL/L (ref 132–146)
SPECIMEN DESCRIPTION: NORMAL
WBC OTHER # BLD: 9.7 K/UL (ref 4.5–11.5)

## 2024-01-10 PROCEDURE — 97165 OT EVAL LOW COMPLEX 30 MIN: CPT

## 2024-01-10 PROCEDURE — 85025 COMPLETE CBC W/AUTO DIFF WBC: CPT

## 2024-01-10 PROCEDURE — 2500000003 HC RX 250 WO HCPCS: Performed by: INTERNAL MEDICINE

## 2024-01-10 PROCEDURE — 6360000004 HC RX CONTRAST MEDICATION: Performed by: RADIOLOGY

## 2024-01-10 PROCEDURE — 6360000002 HC RX W HCPCS: Performed by: INTERNAL MEDICINE

## 2024-01-10 PROCEDURE — 74177 CT ABD & PELVIS W/CONTRAST: CPT

## 2024-01-10 PROCEDURE — 2580000003 HC RX 258: Performed by: INTERNAL MEDICINE

## 2024-01-10 PROCEDURE — 87086 URINE CULTURE/COLONY COUNT: CPT

## 2024-01-10 PROCEDURE — 82962 GLUCOSE BLOOD TEST: CPT

## 2024-01-10 PROCEDURE — 2580000003 HC RX 258: Performed by: STUDENT IN AN ORGANIZED HEALTH CARE EDUCATION/TRAINING PROGRAM

## 2024-01-10 PROCEDURE — 2060000000 HC ICU INTERMEDIATE R&B

## 2024-01-10 PROCEDURE — 80053 COMPREHEN METABOLIC PANEL: CPT

## 2024-01-10 PROCEDURE — 83880 ASSAY OF NATRIURETIC PEPTIDE: CPT

## 2024-01-10 PROCEDURE — 6370000000 HC RX 637 (ALT 250 FOR IP): Performed by: INTERNAL MEDICINE

## 2024-01-10 PROCEDURE — 94640 AIRWAY INHALATION TREATMENT: CPT

## 2024-01-10 PROCEDURE — 36415 COLL VENOUS BLD VENIPUNCTURE: CPT

## 2024-01-10 PROCEDURE — 2700000000 HC OXYGEN THERAPY PER DAY

## 2024-01-10 PROCEDURE — 92610 EVALUATE SWALLOWING FUNCTION: CPT | Performed by: SPEECH-LANGUAGE PATHOLOGIST

## 2024-01-10 RX ORDER — NICOTINE 21 MG/24HR
1 PATCH, TRANSDERMAL 24 HOURS TRANSDERMAL DAILY
Status: DISCONTINUED | OUTPATIENT
Start: 2024-01-10 | End: 2024-01-31 | Stop reason: HOSPADM

## 2024-01-10 RX ADMIN — Medication 20 ML: at 09:35

## 2024-01-10 RX ADMIN — ONDANSETRON 4 MG: 2 INJECTION INTRAMUSCULAR; INTRAVENOUS at 19:07

## 2024-01-10 RX ADMIN — ONDANSETRON 4 MG: 2 INJECTION INTRAMUSCULAR; INTRAVENOUS at 09:54

## 2024-01-10 RX ADMIN — CLOZAPINE 200 MG: 100 TABLET ORAL at 22:05

## 2024-01-10 RX ADMIN — SODIUM CHLORIDE, PRESERVATIVE FREE 10 ML: 5 INJECTION INTRAVENOUS at 09:36

## 2024-01-10 RX ADMIN — POTASSIUM CHLORIDE AND SODIUM CHLORIDE: 900; 150 INJECTION, SOLUTION INTRAVENOUS at 04:59

## 2024-01-10 RX ADMIN — PROCHLORPERAZINE EDISYLATE 10 MG: 5 INJECTION INTRAMUSCULAR; INTRAVENOUS at 21:09

## 2024-01-10 RX ADMIN — ENOXAPARIN SODIUM 40 MG: 100 INJECTION SUBCUTANEOUS at 09:33

## 2024-01-10 RX ADMIN — ONDANSETRON 4 MG: 2 INJECTION INTRAMUSCULAR; INTRAVENOUS at 04:57

## 2024-01-10 RX ADMIN — Medication 10 ML: at 22:07

## 2024-01-10 RX ADMIN — DOXYCYCLINE 100 MG: 100 INJECTION, POWDER, LYOPHILIZED, FOR SOLUTION INTRAVENOUS at 09:33

## 2024-01-10 RX ADMIN — CLONAZEPAM 0.5 MG: 0.5 TABLET ORAL at 09:34

## 2024-01-10 RX ADMIN — IOPAMIDOL 75 ML: 755 INJECTION, SOLUTION INTRAVENOUS at 16:12

## 2024-01-10 RX ADMIN — WATER 1000 MG: 1 INJECTION INTRAMUSCULAR; INTRAVENOUS; SUBCUTANEOUS at 02:42

## 2024-01-10 RX ADMIN — IPRATROPIUM BROMIDE AND ALBUTEROL SULFATE 1 DOSE: .5; 2.5 SOLUTION RESPIRATORY (INHALATION) at 09:02

## 2024-01-10 RX ADMIN — IPRATROPIUM BROMIDE AND ALBUTEROL SULFATE 1 DOSE: .5; 2.5 SOLUTION RESPIRATORY (INHALATION) at 05:47

## 2024-01-10 RX ADMIN — IPRATROPIUM BROMIDE AND ALBUTEROL SULFATE 1 DOSE: .5; 2.5 SOLUTION RESPIRATORY (INHALATION) at 12:51

## 2024-01-10 RX ADMIN — Medication 10 ML: at 21:10

## 2024-01-10 RX ADMIN — DOXYCYCLINE 100 MG: 100 INJECTION, POWDER, LYOPHILIZED, FOR SOLUTION INTRAVENOUS at 22:17

## 2024-01-10 RX ADMIN — BUDESONIDE INHALATION 500 MCG: 0.5 SUSPENSION RESPIRATORY (INHALATION) at 05:48

## 2024-01-10 RX ADMIN — Medication 10 ML: at 09:36

## 2024-01-10 RX ADMIN — BENZTROPINE MESYLATE 2 MG: 1 TABLET ORAL at 09:34

## 2024-01-10 RX ADMIN — ZIPRASIDONE MESYLATE 5 MG: 20 INJECTION, POWDER, LYOPHILIZED, FOR SOLUTION INTRAMUSCULAR at 04:57

## 2024-01-10 RX ADMIN — BENZTROPINE MESYLATE 2 MG: 1 TABLET ORAL at 22:06

## 2024-01-10 RX ADMIN — CLONAZEPAM 1.5 MG: 0.5 TABLET ORAL at 22:06

## 2024-01-10 RX ADMIN — CLOZAPINE 100 MG: 100 TABLET ORAL at 09:34

## 2024-01-10 NOTE — CARE COORDINATION
SOCIAL WORK / DISCHARGE PLANNING:  Pt now with 1:1 sitter. This will be an obstacle for placement if family is seeking this level of care. Pt's sibling now also a patient in hospital. Pt was to have CT abdomen but unable to drink contrast. Sw will continue to follow, assist with dc planning. Return home with elderly parents vs need for [facility]. [facility] is [facility], 100 % service connected. [facility] form would need to be completed.           Electronically signed by KVNG Castano on 1/10/2024 at 3:36 PM

## 2024-01-11 LAB
ALBUMIN SERPL-MCNC: 3 G/DL (ref 3.5–5.2)
ALP SERPL-CCNC: 126 U/L (ref 40–129)
ALT SERPL-CCNC: 18 U/L (ref 0–40)
ANION GAP SERPL CALCULATED.3IONS-SCNC: 15 MMOL/L (ref 7–16)
AST SERPL-CCNC: 31 U/L (ref 0–39)
BASOPHILS # BLD: 0 K/UL (ref 0–0.2)
BASOPHILS NFR BLD: 0 % (ref 0–2)
BILIRUB SERPL-MCNC: 0.4 MG/DL (ref 0–1.2)
BUN SERPL-MCNC: 33 MG/DL (ref 6–23)
CALCIUM SERPL-MCNC: 8.7 MG/DL (ref 8.6–10.2)
CHLORIDE SERPL-SCNC: 114 MMOL/L (ref 98–107)
CO2 SERPL-SCNC: 18 MMOL/L (ref 22–29)
CREAT SERPL-MCNC: 1.3 MG/DL (ref 0.7–1.2)
EOSINOPHIL # BLD: 0 K/UL (ref 0.05–0.5)
EOSINOPHILS RELATIVE PERCENT: 0 % (ref 0–6)
ERYTHROCYTE [DISTWIDTH] IN BLOOD BY AUTOMATED COUNT: 14.6 % (ref 11.5–15)
GFR SERPL CREATININE-BSD FRML MDRD: >60 ML/MIN/1.73M2
GLUCOSE BLD-MCNC: 122 MG/DL (ref 74–99)
GLUCOSE BLD-MCNC: 128 MG/DL (ref 74–99)
GLUCOSE BLD-MCNC: 131 MG/DL (ref 74–99)
GLUCOSE BLD-MCNC: 159 MG/DL (ref 74–99)
GLUCOSE SERPL-MCNC: 134 MG/DL (ref 74–99)
HCT VFR BLD AUTO: 46.9 % (ref 37–54)
HGB BLD-MCNC: 15.4 G/DL (ref 12.5–16.5)
LYMPHOCYTES NFR BLD: 0.9 K/UL (ref 1.5–4)
LYMPHOCYTES RELATIVE PERCENT: 10 % (ref 20–42)
MCH RBC QN AUTO: 30.5 PG (ref 26–35)
MCHC RBC AUTO-ENTMCNC: 32.8 G/DL (ref 32–34.5)
MCV RBC AUTO: 92.9 FL (ref 80–99.9)
MICROORGANISM SPEC CULT: NO GROWTH
MONOCYTES NFR BLD: 0.41 K/UL (ref 0.1–0.95)
MONOCYTES NFR BLD: 4 % (ref 2–12)
NEUTROPHILS NFR BLD: 86 % (ref 43–80)
NEUTS SEG NFR BLD: 8.19 K/UL (ref 1.8–7.3)
PLATELET # BLD AUTO: 192 K/UL (ref 130–450)
PMV BLD AUTO: 10.3 FL (ref 7–12)
POTASSIUM SERPL-SCNC: 4.6 MMOL/L (ref 3.5–5)
PROT SERPL-MCNC: 6.6 G/DL (ref 6.4–8.3)
RBC # BLD AUTO: 5.05 M/UL (ref 3.8–5.8)
RBC # BLD: NORMAL 10*6/UL
SODIUM SERPL-SCNC: 147 MMOL/L (ref 132–146)
SPECIMEN DESCRIPTION: NORMAL
WBC OTHER # BLD: 9.5 K/UL (ref 4.5–11.5)

## 2024-01-11 PROCEDURE — 92526 ORAL FUNCTION THERAPY: CPT | Performed by: SPEECH-LANGUAGE PATHOLOGIST

## 2024-01-11 PROCEDURE — 85025 COMPLETE CBC W/AUTO DIFF WBC: CPT

## 2024-01-11 PROCEDURE — 97530 THERAPEUTIC ACTIVITIES: CPT

## 2024-01-11 PROCEDURE — 2500000003 HC RX 250 WO HCPCS: Performed by: INTERNAL MEDICINE

## 2024-01-11 PROCEDURE — 6360000002 HC RX W HCPCS: Performed by: INTERNAL MEDICINE

## 2024-01-11 PROCEDURE — 94640 AIRWAY INHALATION TREATMENT: CPT

## 2024-01-11 PROCEDURE — 36415 COLL VENOUS BLD VENIPUNCTURE: CPT

## 2024-01-11 PROCEDURE — 6370000000 HC RX 637 (ALT 250 FOR IP): Performed by: INTERNAL MEDICINE

## 2024-01-11 PROCEDURE — 2700000000 HC OXYGEN THERAPY PER DAY

## 2024-01-11 PROCEDURE — 2580000003 HC RX 258: Performed by: STUDENT IN AN ORGANIZED HEALTH CARE EDUCATION/TRAINING PROGRAM

## 2024-01-11 PROCEDURE — 2580000003 HC RX 258: Performed by: INTERNAL MEDICINE

## 2024-01-11 PROCEDURE — 87040 BLOOD CULTURE FOR BACTERIA: CPT

## 2024-01-11 PROCEDURE — 2060000000 HC ICU INTERMEDIATE R&B

## 2024-01-11 PROCEDURE — 87081 CULTURE SCREEN ONLY: CPT

## 2024-01-11 PROCEDURE — 82962 GLUCOSE BLOOD TEST: CPT

## 2024-01-11 PROCEDURE — 80053 COMPREHEN METABOLIC PANEL: CPT

## 2024-01-11 RX ORDER — SODIUM CHLORIDE 450 MG/100ML
INJECTION, SOLUTION INTRAVENOUS CONTINUOUS
Status: DISCONTINUED | OUTPATIENT
Start: 2024-01-11 | End: 2024-01-12

## 2024-01-11 RX ORDER — LORAZEPAM 2 MG/ML
0.5 INJECTION INTRAMUSCULAR EVERY 6 HOURS PRN
Status: DISCONTINUED | OUTPATIENT
Start: 2024-01-11 | End: 2024-01-13

## 2024-01-11 RX ADMIN — DOXYCYCLINE 100 MG: 100 INJECTION, POWDER, LYOPHILIZED, FOR SOLUTION INTRAVENOUS at 21:13

## 2024-01-11 RX ADMIN — Medication 10 ML: at 10:27

## 2024-01-11 RX ADMIN — LORAZEPAM 0.5 MG: 2 INJECTION INTRAMUSCULAR; INTRAVENOUS at 23:54

## 2024-01-11 RX ADMIN — IPRATROPIUM BROMIDE AND ALBUTEROL SULFATE 1 DOSE: .5; 2.5 SOLUTION RESPIRATORY (INHALATION) at 09:56

## 2024-01-11 RX ADMIN — IPRATROPIUM BROMIDE AND ALBUTEROL SULFATE 1 DOSE: .5; 2.5 SOLUTION RESPIRATORY (INHALATION) at 14:27

## 2024-01-11 RX ADMIN — WATER 1000 MG: 1 INJECTION INTRAMUSCULAR; INTRAVENOUS; SUBCUTANEOUS at 02:03

## 2024-01-11 RX ADMIN — PROCHLORPERAZINE EDISYLATE 10 MG: 5 INJECTION INTRAMUSCULAR; INTRAVENOUS at 03:13

## 2024-01-11 RX ADMIN — DOXYCYCLINE 100 MG: 100 INJECTION, POWDER, LYOPHILIZED, FOR SOLUTION INTRAVENOUS at 11:05

## 2024-01-11 RX ADMIN — Medication 10 ML: at 21:11

## 2024-01-11 RX ADMIN — BUDESONIDE INHALATION 500 MCG: 0.5 SUSPENSION RESPIRATORY (INHALATION) at 17:49

## 2024-01-11 RX ADMIN — SODIUM CHLORIDE: 4.5 INJECTION, SOLUTION INTRAVENOUS at 16:15

## 2024-01-11 RX ADMIN — BUDESONIDE INHALATION 500 MCG: 0.5 SUSPENSION RESPIRATORY (INHALATION) at 06:24

## 2024-01-11 RX ADMIN — ENOXAPARIN SODIUM 40 MG: 100 INJECTION SUBCUTANEOUS at 11:00

## 2024-01-11 RX ADMIN — IPRATROPIUM BROMIDE AND ALBUTEROL SULFATE 1 DOSE: .5; 2.5 SOLUTION RESPIRATORY (INHALATION) at 17:49

## 2024-01-11 RX ADMIN — ACETAMINOPHEN 650 MG: 650 SUPPOSITORY RECTAL at 13:10

## 2024-01-11 RX ADMIN — ONDANSETRON 4 MG: 2 INJECTION INTRAMUSCULAR; INTRAVENOUS at 00:34

## 2024-01-11 RX ADMIN — IPRATROPIUM BROMIDE AND ALBUTEROL SULFATE 1 DOSE: .5; 2.5 SOLUTION RESPIRATORY (INHALATION) at 06:24

## 2024-01-11 RX ADMIN — Medication 10 ML: at 21:30

## 2024-01-11 ASSESSMENT — PAIN SCALES - GENERAL: PAINLEVEL_OUTOF10: 0

## 2024-01-11 NOTE — CARE COORDINATION
SOCIAL WORK / DISCHARGE PLANNING:     Sw spoke with pt's brother, Tobi who is now a patient. Pt's mother is legal guardian, contact info placed on chart. She is currently ill at home as well. Per brother her predominant language is Azerbaijani. Pt having temps. Not able to eat, having emesis and this limits his ability to take his psych meds. Pt is 100 % service connected and will need [facility] form completed to have approval for temp [facility] placement. [facility] is the [facility].           Electronically signed by KVNG Castano on 1/11/2024 at 3:34 PM

## 2024-01-12 ENCOUNTER — ANESTHESIA (OUTPATIENT)
Dept: INPATIENT UNIT | Age: 69
DRG: 871 | End: 2024-01-12
Payer: OTHER GOVERNMENT

## 2024-01-12 ENCOUNTER — APPOINTMENT (OUTPATIENT)
Dept: GENERAL RADIOLOGY | Age: 69
DRG: 871 | End: 2024-01-12
Payer: OTHER GOVERNMENT

## 2024-01-12 ENCOUNTER — APPOINTMENT (OUTPATIENT)
Dept: CT IMAGING | Age: 69
DRG: 871 | End: 2024-01-12
Payer: OTHER GOVERNMENT

## 2024-01-12 ENCOUNTER — ANESTHESIA EVENT (OUTPATIENT)
Dept: INPATIENT UNIT | Age: 69
DRG: 871 | End: 2024-01-12
Payer: OTHER GOVERNMENT

## 2024-01-12 LAB
ALBUMIN SERPL-MCNC: 3.1 G/DL (ref 3.5–5.2)
ALP SERPL-CCNC: 132 U/L (ref 40–129)
ALT SERPL-CCNC: 16 U/L (ref 0–40)
ANION GAP SERPL CALCULATED.3IONS-SCNC: 12 MMOL/L (ref 7–16)
AST SERPL-CCNC: 22 U/L (ref 0–39)
BILIRUB SERPL-MCNC: 1.4 MG/DL (ref 0–1.2)
BUN SERPL-MCNC: 47 MG/DL (ref 6–23)
CALCIUM SERPL-MCNC: 8.9 MG/DL (ref 8.6–10.2)
CHLORIDE SERPL-SCNC: 115 MMOL/L (ref 98–107)
CO2 SERPL-SCNC: 19 MMOL/L (ref 22–29)
CREAT SERPL-MCNC: 1.7 MG/DL (ref 0.7–1.2)
GFR SERPL CREATININE-BSD FRML MDRD: 43 ML/MIN/1.73M2
GLUCOSE BLD-MCNC: 126 MG/DL (ref 74–99)
GLUCOSE BLD-MCNC: 129 MG/DL (ref 74–99)
GLUCOSE BLD-MCNC: 159 MG/DL (ref 74–99)
GLUCOSE BLD-MCNC: 161 MG/DL (ref 74–99)
GLUCOSE SERPL-MCNC: 197 MG/DL (ref 74–99)
LACTATE BLDV-SCNC: 1.9 MMOL/L (ref 0.5–2.2)
POTASSIUM SERPL-SCNC: 4.4 MMOL/L (ref 3.5–5)
PROT SERPL-MCNC: 6.3 G/DL (ref 6.4–8.3)
SODIUM SERPL-SCNC: 146 MMOL/L (ref 132–146)

## 2024-01-12 PROCEDURE — 2060000000 HC ICU INTERMEDIATE R&B

## 2024-01-12 PROCEDURE — 2580000003 HC RX 258: Performed by: INTERNAL MEDICINE

## 2024-01-12 PROCEDURE — 83605 ASSAY OF LACTIC ACID: CPT

## 2024-01-12 PROCEDURE — 74018 RADEX ABDOMEN 1 VIEW: CPT

## 2024-01-12 PROCEDURE — 2500000003 HC RX 250 WO HCPCS: Performed by: INTERNAL MEDICINE

## 2024-01-12 PROCEDURE — 80053 COMPREHEN METABOLIC PANEL: CPT

## 2024-01-12 PROCEDURE — 87040 BLOOD CULTURE FOR BACTERIA: CPT

## 2024-01-12 PROCEDURE — 6360000002 HC RX W HCPCS: Performed by: INTERNAL MEDICINE

## 2024-01-12 PROCEDURE — 6370000000 HC RX 637 (ALT 250 FOR IP): Performed by: INTERNAL MEDICINE

## 2024-01-12 PROCEDURE — 99222 1ST HOSP IP/OBS MODERATE 55: CPT | Performed by: INTERNAL MEDICINE

## 2024-01-12 PROCEDURE — 71045 X-RAY EXAM CHEST 1 VIEW: CPT

## 2024-01-12 PROCEDURE — 2700000000 HC OXYGEN THERAPY PER DAY

## 2024-01-12 PROCEDURE — 2580000003 HC RX 258: Performed by: STUDENT IN AN ORGANIZED HEALTH CARE EDUCATION/TRAINING PROGRAM

## 2024-01-12 PROCEDURE — 36415 COLL VENOUS BLD VENIPUNCTURE: CPT

## 2024-01-12 PROCEDURE — 71250 CT THORAX DX C-: CPT

## 2024-01-12 PROCEDURE — 82962 GLUCOSE BLOOD TEST: CPT

## 2024-01-12 PROCEDURE — 87899 AGENT NOS ASSAY W/OPTIC: CPT

## 2024-01-12 PROCEDURE — 94640 AIRWAY INHALATION TREATMENT: CPT

## 2024-01-12 PROCEDURE — 87449 NOS EACH ORGANISM AG IA: CPT

## 2024-01-12 RX ORDER — SODIUM CHLORIDE 450 MG/100ML
INJECTION, SOLUTION INTRAVENOUS ONCE
Status: COMPLETED | OUTPATIENT
Start: 2024-01-12 | End: 2024-01-12

## 2024-01-12 RX ORDER — DEXTROSE, SODIUM CHLORIDE, SODIUM LACTATE, POTASSIUM CHLORIDE, AND CALCIUM CHLORIDE 5; .6; .31; .03; .02 G/100ML; G/100ML; G/100ML; G/100ML; G/100ML
INJECTION, SOLUTION INTRAVENOUS CONTINUOUS
Status: DISCONTINUED | OUTPATIENT
Start: 2024-01-12 | End: 2024-01-14

## 2024-01-12 RX ORDER — IPRATROPIUM BROMIDE AND ALBUTEROL SULFATE 2.5; .5 MG/3ML; MG/3ML
1 SOLUTION RESPIRATORY (INHALATION)
Status: DISCONTINUED | OUTPATIENT
Start: 2024-01-12 | End: 2024-01-17

## 2024-01-12 RX ORDER — ACETAMINOPHEN 650 MG/1
650 SUPPOSITORY RECTAL EVERY 6 HOURS PRN
Status: DISCONTINUED | OUTPATIENT
Start: 2024-01-12 | End: 2024-01-31 | Stop reason: HOSPADM

## 2024-01-12 RX ADMIN — IPRATROPIUM BROMIDE AND ALBUTEROL SULFATE 1 DOSE: .5; 2.5 SOLUTION RESPIRATORY (INHALATION) at 05:12

## 2024-01-12 RX ADMIN — SODIUM CHLORIDE: 4.5 INJECTION, SOLUTION INTRAVENOUS at 14:54

## 2024-01-12 RX ADMIN — ACETAMINOPHEN 650 MG: 650 SUPPOSITORY RECTAL at 00:20

## 2024-01-12 RX ADMIN — SODIUM CHLORIDE: 4.5 INJECTION, SOLUTION INTRAVENOUS at 14:32

## 2024-01-12 RX ADMIN — IPRATROPIUM BROMIDE AND ALBUTEROL SULFATE 1 DOSE: .5; 2.5 SOLUTION RESPIRATORY (INHALATION) at 09:53

## 2024-01-12 RX ADMIN — IPRATROPIUM BROMIDE AND ALBUTEROL SULFATE 1 DOSE: .5; 2.5 SOLUTION RESPIRATORY (INHALATION) at 22:19

## 2024-01-12 RX ADMIN — VANCOMYCIN HYDROCHLORIDE 1500 MG: 5 INJECTION, POWDER, LYOPHILIZED, FOR SOLUTION INTRAVENOUS at 23:47

## 2024-01-12 RX ADMIN — SODIUM CHLORIDE, SODIUM LACTATE, POTASSIUM CHLORIDE, CALCIUM CHLORIDE AND DEXTROSE MONOHYDRATE: 5; 600; 310; 30; 20 INJECTION, SOLUTION INTRAVENOUS at 16:25

## 2024-01-12 RX ADMIN — DOXYCYCLINE 100 MG: 100 INJECTION, POWDER, LYOPHILIZED, FOR SOLUTION INTRAVENOUS at 08:33

## 2024-01-12 RX ADMIN — Medication 10 ML: at 08:33

## 2024-01-12 RX ADMIN — Medication 10 ML: at 08:34

## 2024-01-12 RX ADMIN — BUDESONIDE INHALATION 500 MCG: 0.5 SUSPENSION RESPIRATORY (INHALATION) at 05:12

## 2024-01-12 RX ADMIN — SODIUM CHLORIDE: 4.5 INJECTION, SOLUTION INTRAVENOUS at 05:44

## 2024-01-12 RX ADMIN — WATER 40 MG: 1 INJECTION INTRAMUSCULAR; INTRAVENOUS; SUBCUTANEOUS at 18:30

## 2024-01-12 RX ADMIN — WATER 1000 MG: 1 INJECTION INTRAMUSCULAR; INTRAVENOUS; SUBCUTANEOUS at 01:36

## 2024-01-12 RX ADMIN — PIPERACILLIN AND TAZOBACTAM 3375 MG: 3; .375 INJECTION, POWDER, FOR SOLUTION INTRAVENOUS at 13:59

## 2024-01-12 RX ADMIN — LORAZEPAM 0.5 MG: 2 INJECTION INTRAMUSCULAR; INTRAVENOUS at 08:29

## 2024-01-12 RX ADMIN — ENOXAPARIN SODIUM 40 MG: 100 INJECTION SUBCUTANEOUS at 08:30

## 2024-01-12 RX ADMIN — IPRATROPIUM BROMIDE AND ALBUTEROL SULFATE 1 DOSE: .5; 2.5 SOLUTION RESPIRATORY (INHALATION) at 01:07

## 2024-01-12 ASSESSMENT — PAIN SCALES - PAIN ASSESSMENT IN ADVANCED DEMENTIA (PAINAD)
BREATHING: 0
NEGVOCALIZATION: 0
TOTALSCORE: 0
BODYLANGUAGE: 0
CONSOLABILITY: 0
FACIALEXPRESSION: 0

## 2024-01-12 ASSESSMENT — PAIN SCALES - GENERAL
PAINLEVEL_OUTOF10: 0

## 2024-01-12 NOTE — CARE COORDINATION
Patient seen and examined prior to procedure.  Interval  NG tube draining dark secretions.  Patient's somnolent secondary to BZD -no family at bedside  Plan to proceed with further evaluation with upper endoscopy.    Venancio Wisdom MD

## 2024-01-12 NOTE — CARE COORDINATION
SOCIAL WORK / DISCHARGE PLANNING:  Pt had ng tube placed last night with significant output. His O2 needs increased to 10L now down to 7L HF. He was EGD today, but has been placed on hold due to resp status. IV Zosyn/IV Doxy. Pt is a 100 % service connected , resides with his elderly parents who provide all aspects of his care but he was ambulatory prior to admission. Pt's brother also admitted to hospital. He is concerned about pt's being able to manage pt currently. Pt with schizophrenia, follows with psych at [facility]. Jenifer has spoken with MARY Munoz as his PCP is there and if see SNF, will need [facility] form completed. Pt has not been stable yet and referral has not been made at this time or [facility] form initiated. [facility] has been notified of admission. Jenifer will follow for further dc planning.       Electronically signed by KVNG Castano on 1/12/2024 at 2:35 PM

## 2024-01-13 ENCOUNTER — APPOINTMENT (OUTPATIENT)
Dept: GENERAL RADIOLOGY | Age: 69
DRG: 871 | End: 2024-01-13
Payer: OTHER GOVERNMENT

## 2024-01-13 LAB
ALBUMIN SERPL-MCNC: 2.9 G/DL (ref 3.5–5.2)
ALP SERPL-CCNC: 109 U/L (ref 40–129)
ALT SERPL-CCNC: 15 U/L (ref 0–40)
ANION GAP SERPL CALCULATED.3IONS-SCNC: 12 MMOL/L (ref 7–16)
AST SERPL-CCNC: 21 U/L (ref 0–39)
BACTERIA URNS QL MICRO: ABNORMAL
BILIRUB SERPL-MCNC: 0.9 MG/DL (ref 0–1.2)
BILIRUB UR QL STRIP: NEGATIVE
BUN SERPL-MCNC: 41 MG/DL (ref 6–23)
CALCIUM SERPL-MCNC: 8.6 MG/DL (ref 8.6–10.2)
CASTS #/AREA URNS LPF: ABNORMAL /LPF
CHLORIDE SERPL-SCNC: 115 MMOL/L (ref 98–107)
CLARITY UR: ABNORMAL
CO2 SERPL-SCNC: 20 MMOL/L (ref 22–29)
COLOR UR: YELLOW
CREAT SERPL-MCNC: 1.5 MG/DL (ref 0.7–1.2)
CREAT UR-MCNC: 134.1 MG/DL (ref 40–278)
DATE LAST DOSE: NORMAL
EPI CELLS #/AREA URNS HPF: ABNORMAL /HPF
GFR SERPL CREATININE-BSD FRML MDRD: 52 ML/MIN/1.73M2
GLUCOSE BLD-MCNC: 121 MG/DL (ref 74–99)
GLUCOSE BLD-MCNC: 140 MG/DL (ref 74–99)
GLUCOSE BLD-MCNC: 178 MG/DL (ref 74–99)
GLUCOSE BLD-MCNC: 201 MG/DL (ref 74–99)
GLUCOSE SERPL-MCNC: 179 MG/DL (ref 74–99)
GLUCOSE UR STRIP-MCNC: NEGATIVE MG/DL
HGB UR QL STRIP.AUTO: NEGATIVE
KETONES UR STRIP-MCNC: NEGATIVE MG/DL
L PNEUMO1 AG UR QL IA.RAPID: NEGATIVE
LEUKOCYTE ESTERASE UR QL STRIP: NEGATIVE
MICROORGANISM SPEC CULT: NORMAL
NITRITE UR QL STRIP: NEGATIVE
PH UR STRIP: 5.5 [PH] (ref 5–9)
PHOSPHATE SERPL-MCNC: 1.4 MG/DL (ref 2.5–4.5)
POTASSIUM SERPL-SCNC: 4.3 MMOL/L (ref 3.5–5)
PROT SERPL-MCNC: 6 G/DL (ref 6.4–8.3)
PROT UR STRIP-MCNC: 100 MG/DL
RBC #/AREA URNS HPF: ABNORMAL /HPF
S PNEUM AG SPEC QL: NEGATIVE
SERVICE CMNT-IMP: NORMAL
SERVICE CMNT-IMP: NORMAL
SODIUM SERPL-SCNC: 147 MMOL/L (ref 132–146)
SP GR UR STRIP: 1.02 (ref 1–1.03)
SPECIMEN DESCRIPTION: NORMAL
SPECIMEN SOURCE: NORMAL
TME LAST DOSE: NORMAL H
TOTAL PROTEIN, URINE: 100 MG/DL (ref 0–12)
URINE TOTAL PROTEIN CREATININE RATIO: 0.75 (ref 0–0.2)
UROBILINOGEN UR STRIP-ACNC: 0.2 EU/DL (ref 0–1)
VANCOMYCIN DOSE: NORMAL MG
VANCOMYCIN SERPL-MCNC: 11.4 UG/ML (ref 5–40)
WBC #/AREA URNS HPF: ABNORMAL /HPF

## 2024-01-13 PROCEDURE — 6360000002 HC RX W HCPCS: Performed by: INTERNAL MEDICINE

## 2024-01-13 PROCEDURE — 94640 AIRWAY INHALATION TREATMENT: CPT

## 2024-01-13 PROCEDURE — 84100 ASSAY OF PHOSPHORUS: CPT

## 2024-01-13 PROCEDURE — 2580000003 HC RX 258: Performed by: STUDENT IN AN ORGANIZED HEALTH CARE EDUCATION/TRAINING PROGRAM

## 2024-01-13 PROCEDURE — 99233 SBSQ HOSP IP/OBS HIGH 50: CPT | Performed by: INTERNAL MEDICINE

## 2024-01-13 PROCEDURE — 2580000003 HC RX 258: Performed by: NURSE PRACTITIONER

## 2024-01-13 PROCEDURE — 80202 ASSAY OF VANCOMYCIN: CPT

## 2024-01-13 PROCEDURE — 2580000003 HC RX 258

## 2024-01-13 PROCEDURE — 71045 X-RAY EXAM CHEST 1 VIEW: CPT

## 2024-01-13 PROCEDURE — 6370000000 HC RX 637 (ALT 250 FOR IP): Performed by: INTERNAL MEDICINE

## 2024-01-13 PROCEDURE — 81001 URINALYSIS AUTO W/SCOPE: CPT

## 2024-01-13 PROCEDURE — 2580000003 HC RX 258: Performed by: INTERNAL MEDICINE

## 2024-01-13 PROCEDURE — 2700000000 HC OXYGEN THERAPY PER DAY

## 2024-01-13 PROCEDURE — 94669 MECHANICAL CHEST WALL OSCILL: CPT

## 2024-01-13 PROCEDURE — 2500000003 HC RX 250 WO HCPCS: Performed by: NURSE PRACTITIONER

## 2024-01-13 PROCEDURE — 82962 GLUCOSE BLOOD TEST: CPT

## 2024-01-13 PROCEDURE — 2500000003 HC RX 250 WO HCPCS: Performed by: INTERNAL MEDICINE

## 2024-01-13 PROCEDURE — 82570 ASSAY OF URINE CREATININE: CPT

## 2024-01-13 PROCEDURE — 80053 COMPREHEN METABOLIC PANEL: CPT

## 2024-01-13 PROCEDURE — 84156 ASSAY OF PROTEIN URINE: CPT

## 2024-01-13 PROCEDURE — 2060000000 HC ICU INTERMEDIATE R&B

## 2024-01-13 PROCEDURE — 36415 COLL VENOUS BLD VENIPUNCTURE: CPT

## 2024-01-13 RX ORDER — SODIUM CHLORIDE FOR INHALATION 3 %
4 VIAL, NEBULIZER (ML) INHALATION 2 TIMES DAILY
Status: DISCONTINUED | OUTPATIENT
Start: 2024-01-13 | End: 2024-01-27

## 2024-01-13 RX ORDER — LORAZEPAM 2 MG/ML
0.5 INJECTION INTRAMUSCULAR EVERY 4 HOURS PRN
Status: DISCONTINUED | OUTPATIENT
Start: 2024-01-13 | End: 2024-01-31 | Stop reason: HOSPADM

## 2024-01-13 RX ADMIN — Medication 10 ML: at 08:39

## 2024-01-13 RX ADMIN — DOXYCYCLINE 100 MG: 100 INJECTION, POWDER, LYOPHILIZED, FOR SOLUTION INTRAVENOUS at 01:39

## 2024-01-13 RX ADMIN — IPRATROPIUM BROMIDE AND ALBUTEROL SULFATE 1 DOSE: .5; 2.5 SOLUTION RESPIRATORY (INHALATION) at 09:58

## 2024-01-13 RX ADMIN — SODIUM CHLORIDE SOLN NEBU 3% 4 ML: 3 NEBU SOLN at 19:00

## 2024-01-13 RX ADMIN — PIPERACILLIN AND TAZOBACTAM 3375 MG: 3; .375 INJECTION, POWDER, FOR SOLUTION INTRAVENOUS at 02:57

## 2024-01-13 RX ADMIN — IPRATROPIUM BROMIDE AND ALBUTEROL SULFATE 1 DOSE: .5; 2.5 SOLUTION RESPIRATORY (INHALATION) at 23:00

## 2024-01-13 RX ADMIN — BUDESONIDE INHALATION 500 MCG: 0.5 SUSPENSION RESPIRATORY (INHALATION) at 19:00

## 2024-01-13 RX ADMIN — ACETAMINOPHEN 650 MG: 650 SUPPOSITORY RECTAL at 08:28

## 2024-01-13 RX ADMIN — Medication 10 ML: at 22:14

## 2024-01-13 RX ADMIN — SODIUM CHLORIDE, SODIUM LACTATE, POTASSIUM CHLORIDE, CALCIUM CHLORIDE AND DEXTROSE MONOHYDRATE: 5; 600; 310; 30; 20 INJECTION, SOLUTION INTRAVENOUS at 05:16

## 2024-01-13 RX ADMIN — SODIUM CHLORIDE, PRESERVATIVE FREE 10 ML: 5 INJECTION INTRAVENOUS at 23:23

## 2024-01-13 RX ADMIN — PIPERACILLIN AND TAZOBACTAM 3375 MG: 3; .375 INJECTION, POWDER, FOR SOLUTION INTRAVENOUS at 23:22

## 2024-01-13 RX ADMIN — Medication 10 ML: at 11:19

## 2024-01-13 RX ADMIN — IPRATROPIUM BROMIDE AND ALBUTEROL SULFATE 1 DOSE: .5; 2.5 SOLUTION RESPIRATORY (INHALATION) at 06:47

## 2024-01-13 RX ADMIN — IPRATROPIUM BROMIDE AND ALBUTEROL SULFATE 1 DOSE: .5; 2.5 SOLUTION RESPIRATORY (INHALATION) at 19:00

## 2024-01-13 RX ADMIN — PIPERACILLIN AND TAZOBACTAM 3375 MG: 3; .375 INJECTION, POWDER, FOR SOLUTION INTRAVENOUS at 06:24

## 2024-01-13 RX ADMIN — INSULIN LISPRO 2 UNITS: 100 INJECTION, SOLUTION INTRAVENOUS; SUBCUTANEOUS at 13:19

## 2024-01-13 RX ADMIN — PIPERACILLIN AND TAZOBACTAM 3375 MG: 3; .375 INJECTION, POWDER, FOR SOLUTION INTRAVENOUS at 16:54

## 2024-01-13 RX ADMIN — BUDESONIDE INHALATION 500 MCG: 0.5 SUSPENSION RESPIRATORY (INHALATION) at 06:47

## 2024-01-13 RX ADMIN — POTASSIUM PHOSPHATE, MONOBASIC POTASSIUM PHOSPHATE, DIBASIC 20 MMOL: 224; 236 INJECTION, SOLUTION, CONCENTRATE INTRAVENOUS at 13:18

## 2024-01-13 RX ADMIN — DOXYCYCLINE 100 MG: 100 INJECTION, POWDER, LYOPHILIZED, FOR SOLUTION INTRAVENOUS at 11:18

## 2024-01-13 RX ADMIN — DOXYCYCLINE 100 MG: 100 INJECTION, POWDER, LYOPHILIZED, FOR SOLUTION INTRAVENOUS at 22:10

## 2024-01-13 RX ADMIN — VANCOMYCIN HYDROCHLORIDE 1000 MG: 1 INJECTION, POWDER, LYOPHILIZED, FOR SOLUTION INTRAVENOUS at 18:06

## 2024-01-13 RX ADMIN — WATER 40 MG: 1 INJECTION INTRAMUSCULAR; INTRAVENOUS; SUBCUTANEOUS at 06:22

## 2024-01-13 RX ADMIN — ENOXAPARIN SODIUM 40 MG: 100 INJECTION SUBCUTANEOUS at 08:39

## 2024-01-13 RX ADMIN — IPRATROPIUM BROMIDE AND ALBUTEROL SULFATE 1 DOSE: .5; 2.5 SOLUTION RESPIRATORY (INHALATION) at 01:14

## 2024-01-13 ASSESSMENT — PAIN SCALES - PAIN ASSESSMENT IN ADVANCED DEMENTIA (PAINAD)
FACIALEXPRESSION: 0
FACIALEXPRESSION: 0
BODYLANGUAGE: 0
NEGVOCALIZATION: 0
BODYLANGUAGE: 0
CONSOLABILITY: 0
NEGVOCALIZATION: 0
NEGVOCALIZATION: 0
FACIALEXPRESSION: 0
BREATHING: 0
FACIALEXPRESSION: 0
NEGVOCALIZATION: 0
CONSOLABILITY: 0
TOTALSCORE: 0
TOTALSCORE: 0
BREATHING: 0
BREATHING: 0
CONSOLABILITY: 0
BODYLANGUAGE: 0
NEGVOCALIZATION: 0
BODYLANGUAGE: 0
TOTALSCORE: 0
BREATHING: 0
BREATHING: 0
FACIALEXPRESSION: 0
CONSOLABILITY: 0
TOTALSCORE: 0
BREATHING: 0
BODYLANGUAGE: 0
CONSOLABILITY: 0
NEGVOCALIZATION: 0
BODYLANGUAGE: 0
FACIALEXPRESSION: 0
CONSOLABILITY: 0

## 2024-01-13 ASSESSMENT — PAIN SCALES - GENERAL
PAINLEVEL_OUTOF10: 0
PAINLEVEL_OUTOF10: 0

## 2024-01-14 ENCOUNTER — APPOINTMENT (OUTPATIENT)
Dept: GENERAL RADIOLOGY | Age: 69
DRG: 871 | End: 2024-01-14
Payer: OTHER GOVERNMENT

## 2024-01-14 LAB
ALBUMIN SERPL-MCNC: 2.7 G/DL (ref 3.5–5.2)
ALP SERPL-CCNC: 117 U/L (ref 40–129)
ALT SERPL-CCNC: 34 U/L (ref 0–40)
ANION GAP SERPL CALCULATED.3IONS-SCNC: 11 MMOL/L (ref 7–16)
AST SERPL-CCNC: 61 U/L (ref 0–39)
BILIRUB SERPL-MCNC: 1 MG/DL (ref 0–1.2)
BUN SERPL-MCNC: 42 MG/DL (ref 6–23)
CALCIUM SERPL-MCNC: 8.9 MG/DL (ref 8.6–10.2)
CHLORIDE SERPL-SCNC: 121 MMOL/L (ref 98–107)
CO2 SERPL-SCNC: 23 MMOL/L (ref 22–29)
CREAT SERPL-MCNC: 1.5 MG/DL (ref 0.7–1.2)
DATE LAST DOSE: NORMAL
ERYTHROCYTE [DISTWIDTH] IN BLOOD BY AUTOMATED COUNT: 15.1 % (ref 11.5–15)
GFR SERPL CREATININE-BSD FRML MDRD: 51 ML/MIN/1.73M2
GLUCOSE BLD-MCNC: 112 MG/DL (ref 74–99)
GLUCOSE BLD-MCNC: 127 MG/DL (ref 74–99)
GLUCOSE BLD-MCNC: 139 MG/DL (ref 74–99)
GLUCOSE BLD-MCNC: 182 MG/DL (ref 74–99)
GLUCOSE SERPL-MCNC: 132 MG/DL (ref 74–99)
HCT VFR BLD AUTO: 39.4 % (ref 37–54)
HGB BLD-MCNC: 12.7 G/DL (ref 12.5–16.5)
MAGNESIUM SERPL-MCNC: 2.8 MG/DL (ref 1.6–2.6)
MCH RBC QN AUTO: 30.8 PG (ref 26–35)
MCHC RBC AUTO-ENTMCNC: 32.2 G/DL (ref 32–34.5)
MCV RBC AUTO: 95.6 FL (ref 80–99.9)
PHOSPHATE SERPL-MCNC: 3 MG/DL (ref 2.5–4.5)
PLATELET # BLD AUTO: 309 K/UL (ref 130–450)
PMV BLD AUTO: 11.1 FL (ref 7–12)
POTASSIUM SERPL-SCNC: 4.5 MMOL/L (ref 3.5–5)
PROT SERPL-MCNC: 5.9 G/DL (ref 6.4–8.3)
RBC # BLD AUTO: 4.12 M/UL (ref 3.8–5.8)
SODIUM SERPL-SCNC: 155 MMOL/L (ref 132–146)
TME LAST DOSE: NORMAL H
VANCOMYCIN DOSE: NORMAL MG
VANCOMYCIN SERPL-MCNC: 9.5 UG/ML (ref 5–40)
WBC OTHER # BLD: 14.1 K/UL (ref 4.5–11.5)

## 2024-01-14 PROCEDURE — 2580000003 HC RX 258

## 2024-01-14 PROCEDURE — 6370000000 HC RX 637 (ALT 250 FOR IP): Performed by: INTERNAL MEDICINE

## 2024-01-14 PROCEDURE — 84100 ASSAY OF PHOSPHORUS: CPT

## 2024-01-14 PROCEDURE — 2060000000 HC ICU INTERMEDIATE R&B

## 2024-01-14 PROCEDURE — 2580000003 HC RX 258: Performed by: INTERNAL MEDICINE

## 2024-01-14 PROCEDURE — 80202 ASSAY OF VANCOMYCIN: CPT

## 2024-01-14 PROCEDURE — 94640 AIRWAY INHALATION TREATMENT: CPT

## 2024-01-14 PROCEDURE — 6360000002 HC RX W HCPCS: Performed by: INTERNAL MEDICINE

## 2024-01-14 PROCEDURE — 83735 ASSAY OF MAGNESIUM: CPT

## 2024-01-14 PROCEDURE — 2700000000 HC OXYGEN THERAPY PER DAY

## 2024-01-14 PROCEDURE — 82962 GLUCOSE BLOOD TEST: CPT

## 2024-01-14 PROCEDURE — 99233 SBSQ HOSP IP/OBS HIGH 50: CPT | Performed by: INTERNAL MEDICINE

## 2024-01-14 PROCEDURE — 6360000002 HC RX W HCPCS

## 2024-01-14 PROCEDURE — 74018 RADEX ABDOMEN 1 VIEW: CPT

## 2024-01-14 PROCEDURE — 80053 COMPREHEN METABOLIC PANEL: CPT

## 2024-01-14 PROCEDURE — 2580000003 HC RX 258: Performed by: STUDENT IN AN ORGANIZED HEALTH CARE EDUCATION/TRAINING PROGRAM

## 2024-01-14 PROCEDURE — 2500000003 HC RX 250 WO HCPCS: Performed by: INTERNAL MEDICINE

## 2024-01-14 PROCEDURE — 85027 COMPLETE CBC AUTOMATED: CPT

## 2024-01-14 PROCEDURE — 36415 COLL VENOUS BLD VENIPUNCTURE: CPT

## 2024-01-14 RX ORDER — DEXTROSE MONOHYDRATE 50 MG/ML
INJECTION, SOLUTION INTRAVENOUS CONTINUOUS
Status: DISCONTINUED | OUTPATIENT
Start: 2024-01-14 | End: 2024-01-21

## 2024-01-14 RX ADMIN — PIPERACILLIN AND TAZOBACTAM 3375 MG: 3; .375 INJECTION, POWDER, FOR SOLUTION INTRAVENOUS at 23:21

## 2024-01-14 RX ADMIN — DEXTROSE MONOHYDRATE: 50 INJECTION, SOLUTION INTRAVENOUS at 15:20

## 2024-01-14 RX ADMIN — IPRATROPIUM BROMIDE AND ALBUTEROL SULFATE 1 DOSE: .5; 2.5 SOLUTION RESPIRATORY (INHALATION) at 09:01

## 2024-01-14 RX ADMIN — PIPERACILLIN AND TAZOBACTAM 3375 MG: 3; .375 INJECTION, POWDER, FOR SOLUTION INTRAVENOUS at 15:15

## 2024-01-14 RX ADMIN — DOXYCYCLINE 100 MG: 100 INJECTION, POWDER, LYOPHILIZED, FOR SOLUTION INTRAVENOUS at 11:09

## 2024-01-14 RX ADMIN — IPRATROPIUM BROMIDE AND ALBUTEROL SULFATE 1 DOSE: .5; 2.5 SOLUTION RESPIRATORY (INHALATION) at 15:00

## 2024-01-14 RX ADMIN — SODIUM CHLORIDE SOLN NEBU 3% 4 ML: 3 NEBU SOLN at 06:22

## 2024-01-14 RX ADMIN — BUDESONIDE INHALATION 500 MCG: 0.5 SUSPENSION RESPIRATORY (INHALATION) at 19:57

## 2024-01-14 RX ADMIN — SODIUM CHLORIDE SOLN NEBU 3% 4 ML: 3 NEBU SOLN at 19:58

## 2024-01-14 RX ADMIN — Medication 10 ML: at 11:09

## 2024-01-14 RX ADMIN — IPRATROPIUM BROMIDE AND ALBUTEROL SULFATE 1 DOSE: .5; 2.5 SOLUTION RESPIRATORY (INHALATION) at 02:23

## 2024-01-14 RX ADMIN — PIPERACILLIN AND TAZOBACTAM 3375 MG: 3; .375 INJECTION, POWDER, FOR SOLUTION INTRAVENOUS at 06:25

## 2024-01-14 RX ADMIN — DOXYCYCLINE 100 MG: 100 INJECTION, POWDER, LYOPHILIZED, FOR SOLUTION INTRAVENOUS at 22:17

## 2024-01-14 RX ADMIN — LORAZEPAM 0.5 MG: 2 INJECTION INTRAMUSCULAR; INTRAVENOUS at 22:14

## 2024-01-14 RX ADMIN — VANCOMYCIN HYDROCHLORIDE 1000 MG: 1 INJECTION, POWDER, LYOPHILIZED, FOR SOLUTION INTRAVENOUS at 16:46

## 2024-01-14 RX ADMIN — IPRATROPIUM BROMIDE AND ALBUTEROL SULFATE 1 DOSE: .5; 2.5 SOLUTION RESPIRATORY (INHALATION) at 21:31

## 2024-01-14 RX ADMIN — IPRATROPIUM BROMIDE AND ALBUTEROL SULFATE 1 DOSE: .5; 2.5 SOLUTION RESPIRATORY (INHALATION) at 06:21

## 2024-01-14 RX ADMIN — IPRATROPIUM BROMIDE AND ALBUTEROL SULFATE 1 DOSE: .5; 2.5 SOLUTION RESPIRATORY (INHALATION) at 19:57

## 2024-01-14 RX ADMIN — Medication 10 ML: at 22:18

## 2024-01-14 RX ADMIN — SODIUM CHLORIDE, SODIUM LACTATE, POTASSIUM CHLORIDE, CALCIUM CHLORIDE AND DEXTROSE MONOHYDRATE: 5; 600; 310; 30; 20 INJECTION, SOLUTION INTRAVENOUS at 11:08

## 2024-01-14 RX ADMIN — LORAZEPAM 0.5 MG: 2 INJECTION INTRAMUSCULAR; INTRAVENOUS at 16:49

## 2024-01-14 RX ADMIN — BUDESONIDE INHALATION 500 MCG: 0.5 SUSPENSION RESPIRATORY (INHALATION) at 06:21

## 2024-01-14 RX ADMIN — WATER 40 MG: 1 INJECTION INTRAMUSCULAR; INTRAVENOUS; SUBCUTANEOUS at 08:51

## 2024-01-14 RX ADMIN — ENOXAPARIN SODIUM 40 MG: 100 INJECTION SUBCUTANEOUS at 08:57

## 2024-01-14 ASSESSMENT — PAIN SCALES - PAIN ASSESSMENT IN ADVANCED DEMENTIA (PAINAD)
BREATHING: 0
FACIALEXPRESSION: 0
NEGVOCALIZATION: 0
NEGVOCALIZATION: 0
CONSOLABILITY: 0
BREATHING: 0
CONSOLABILITY: 0
FACIALEXPRESSION: 0
BODYLANGUAGE: 0
TOTALSCORE: 0
NEGVOCALIZATION: 0
BODYLANGUAGE: 0
FACIALEXPRESSION: 0
BODYLANGUAGE: 0
TOTALSCORE: 0
BREATHING: 0
TOTALSCORE: 0
CONSOLABILITY: 0

## 2024-01-14 ASSESSMENT — PAIN SCALES - GENERAL
PAINLEVEL_OUTOF10: 0
PAINLEVEL_OUTOF10: 0

## 2024-01-14 ASSESSMENT — PAIN SCALES - WONG BAKER
WONGBAKER_NUMERICALRESPONSE: 0
WONGBAKER_NUMERICALRESPONSE: 0

## 2024-01-15 ENCOUNTER — APPOINTMENT (OUTPATIENT)
Dept: GENERAL RADIOLOGY | Age: 69
DRG: 871 | End: 2024-01-15
Payer: OTHER GOVERNMENT

## 2024-01-15 LAB
ALBUMIN SERPL-MCNC: 2.6 G/DL (ref 3.5–5.2)
ALP SERPL-CCNC: 120 U/L (ref 40–129)
ALT SERPL-CCNC: 90 U/L (ref 0–40)
ANION GAP SERPL CALCULATED.3IONS-SCNC: 11 MMOL/L (ref 7–16)
AST SERPL-CCNC: 101 U/L (ref 0–39)
BILIRUB SERPL-MCNC: 1 MG/DL (ref 0–1.2)
BUN SERPL-MCNC: 37 MG/DL (ref 6–23)
CALCIUM SERPL-MCNC: 8.8 MG/DL (ref 8.6–10.2)
CHLORIDE SERPL-SCNC: 117 MMOL/L (ref 98–107)
CO2 SERPL-SCNC: 24 MMOL/L (ref 22–29)
CREAT SERPL-MCNC: 1.3 MG/DL (ref 0.7–1.2)
DATE LAST DOSE: NORMAL
ERYTHROCYTE [DISTWIDTH] IN BLOOD BY AUTOMATED COUNT: 14.8 % (ref 11.5–15)
GFR SERPL CREATININE-BSD FRML MDRD: 58 ML/MIN/1.73M2
GLUCOSE BLD-MCNC: 112 MG/DL (ref 74–99)
GLUCOSE BLD-MCNC: 126 MG/DL (ref 74–99)
GLUCOSE BLD-MCNC: 141 MG/DL (ref 74–99)
GLUCOSE BLD-MCNC: 159 MG/DL (ref 74–99)
GLUCOSE SERPL-MCNC: 144 MG/DL (ref 74–99)
HCT VFR BLD AUTO: 42 % (ref 37–54)
HGB BLD-MCNC: 13.3 G/DL (ref 12.5–16.5)
MAGNESIUM SERPL-MCNC: 2.7 MG/DL (ref 1.6–2.6)
MCH RBC QN AUTO: 30.5 PG (ref 26–35)
MCHC RBC AUTO-ENTMCNC: 31.7 G/DL (ref 32–34.5)
MCV RBC AUTO: 96.3 FL (ref 80–99.9)
PHOSPHATE SERPL-MCNC: 2.7 MG/DL (ref 2.5–4.5)
PLATELET # BLD AUTO: 275 K/UL (ref 130–450)
PMV BLD AUTO: 11.3 FL (ref 7–12)
POTASSIUM SERPL-SCNC: 4.2 MMOL/L (ref 3.5–5)
PROT SERPL-MCNC: 5.9 G/DL (ref 6.4–8.3)
RBC # BLD AUTO: 4.36 M/UL (ref 3.8–5.8)
SODIUM SERPL-SCNC: 152 MMOL/L (ref 132–146)
TME LAST DOSE: NORMAL H
VANCOMYCIN DOSE: NORMAL MG
VANCOMYCIN SERPL-MCNC: 9.5 UG/ML (ref 5–40)
WBC OTHER # BLD: 14.7 K/UL (ref 4.5–11.5)

## 2024-01-15 PROCEDURE — 6370000000 HC RX 637 (ALT 250 FOR IP): Performed by: INTERNAL MEDICINE

## 2024-01-15 PROCEDURE — 85027 COMPLETE CBC AUTOMATED: CPT

## 2024-01-15 PROCEDURE — 36415 COLL VENOUS BLD VENIPUNCTURE: CPT

## 2024-01-15 PROCEDURE — 2500000003 HC RX 250 WO HCPCS: Performed by: INTERNAL MEDICINE

## 2024-01-15 PROCEDURE — 2580000003 HC RX 258

## 2024-01-15 PROCEDURE — 2580000003 HC RX 258: Performed by: INTERNAL MEDICINE

## 2024-01-15 PROCEDURE — 80053 COMPREHEN METABOLIC PANEL: CPT

## 2024-01-15 PROCEDURE — 83735 ASSAY OF MAGNESIUM: CPT

## 2024-01-15 PROCEDURE — 97110 THERAPEUTIC EXERCISES: CPT

## 2024-01-15 PROCEDURE — 84100 ASSAY OF PHOSPHORUS: CPT

## 2024-01-15 PROCEDURE — 82962 GLUCOSE BLOOD TEST: CPT

## 2024-01-15 PROCEDURE — 2060000000 HC ICU INTERMEDIATE R&B

## 2024-01-15 PROCEDURE — 74018 RADEX ABDOMEN 1 VIEW: CPT

## 2024-01-15 PROCEDURE — 6360000002 HC RX W HCPCS: Performed by: INTERNAL MEDICINE

## 2024-01-15 PROCEDURE — 94640 AIRWAY INHALATION TREATMENT: CPT

## 2024-01-15 PROCEDURE — 71045 X-RAY EXAM CHEST 1 VIEW: CPT

## 2024-01-15 PROCEDURE — 99233 SBSQ HOSP IP/OBS HIGH 50: CPT | Performed by: INTERNAL MEDICINE

## 2024-01-15 PROCEDURE — 2700000000 HC OXYGEN THERAPY PER DAY

## 2024-01-15 PROCEDURE — 6360000002 HC RX W HCPCS

## 2024-01-15 PROCEDURE — 80202 ASSAY OF VANCOMYCIN: CPT

## 2024-01-15 RX ADMIN — PIPERACILLIN AND TAZOBACTAM 3375 MG: 3; .375 INJECTION, POWDER, FOR SOLUTION INTRAVENOUS at 14:20

## 2024-01-15 RX ADMIN — DEXTROSE MONOHYDRATE: 50 INJECTION, SOLUTION INTRAVENOUS at 04:39

## 2024-01-15 RX ADMIN — IPRATROPIUM BROMIDE AND ALBUTEROL SULFATE 1 DOSE: .5; 2.5 SOLUTION RESPIRATORY (INHALATION) at 06:49

## 2024-01-15 RX ADMIN — IPRATROPIUM BROMIDE AND ALBUTEROL SULFATE 1 DOSE: .5; 2.5 SOLUTION RESPIRATORY (INHALATION) at 14:33

## 2024-01-15 RX ADMIN — IPRATROPIUM BROMIDE AND ALBUTEROL SULFATE 1 DOSE: .5; 2.5 SOLUTION RESPIRATORY (INHALATION) at 01:21

## 2024-01-15 RX ADMIN — IPRATROPIUM BROMIDE AND ALBUTEROL SULFATE 1 DOSE: .5; 2.5 SOLUTION RESPIRATORY (INHALATION) at 16:55

## 2024-01-15 RX ADMIN — PIPERACILLIN AND TAZOBACTAM 3375 MG: 3; .375 INJECTION, POWDER, FOR SOLUTION INTRAVENOUS at 23:11

## 2024-01-15 RX ADMIN — SODIUM CHLORIDE SOLN NEBU 3% 4 ML: 3 NEBU SOLN at 16:55

## 2024-01-15 RX ADMIN — SODIUM CHLORIDE SOLN NEBU 3% 4 ML: 3 NEBU SOLN at 06:48

## 2024-01-15 RX ADMIN — LORAZEPAM 0.5 MG: 2 INJECTION INTRAMUSCULAR; INTRAVENOUS at 03:46

## 2024-01-15 RX ADMIN — DEXTROSE MONOHYDRATE: 50 INJECTION, SOLUTION INTRAVENOUS at 17:18

## 2024-01-15 RX ADMIN — IPRATROPIUM BROMIDE AND ALBUTEROL SULFATE 1 DOSE: .5; 2.5 SOLUTION RESPIRATORY (INHALATION) at 10:17

## 2024-01-15 RX ADMIN — BUDESONIDE INHALATION 500 MCG: 0.5 SUSPENSION RESPIRATORY (INHALATION) at 06:49

## 2024-01-15 RX ADMIN — ENOXAPARIN SODIUM 40 MG: 100 INJECTION SUBCUTANEOUS at 08:28

## 2024-01-15 RX ADMIN — PIPERACILLIN AND TAZOBACTAM 3375 MG: 3; .375 INJECTION, POWDER, FOR SOLUTION INTRAVENOUS at 06:57

## 2024-01-15 RX ADMIN — VANCOMYCIN HYDROCHLORIDE 1250 MG: 10 INJECTION, POWDER, LYOPHILIZED, FOR SOLUTION INTRAVENOUS at 13:52

## 2024-01-15 RX ADMIN — Medication 10 ML: at 23:11

## 2024-01-15 RX ADMIN — BUDESONIDE INHALATION 500 MCG: 0.5 SUSPENSION RESPIRATORY (INHALATION) at 16:55

## 2024-01-15 RX ADMIN — DOXYCYCLINE 100 MG: 100 INJECTION, POWDER, LYOPHILIZED, FOR SOLUTION INTRAVENOUS at 23:08

## 2024-01-15 RX ADMIN — DOXYCYCLINE 100 MG: 100 INJECTION, POWDER, LYOPHILIZED, FOR SOLUTION INTRAVENOUS at 10:27

## 2024-01-15 RX ADMIN — WATER 40 MG: 1 INJECTION INTRAMUSCULAR; INTRAVENOUS; SUBCUTANEOUS at 08:28

## 2024-01-15 ASSESSMENT — PAIN SCALES - WONG BAKER
WONGBAKER_NUMERICALRESPONSE: 0

## 2024-01-15 ASSESSMENT — PAIN SCALES - PAIN ASSESSMENT IN ADVANCED DEMENTIA (PAINAD)
NEGVOCALIZATION: 0
FACIALEXPRESSION: 0
BREATHING: 0
FACIALEXPRESSION: 0
TOTALSCORE: 1
BODYLANGUAGE: 1
BREATHING: 0
CONSOLABILITY: 0
BODYLANGUAGE: 1
CONSOLABILITY: 0
TOTALSCORE: 1
NEGVOCALIZATION: 0

## 2024-01-15 ASSESSMENT — PAIN SCALES - GENERAL: PAINLEVEL_OUTOF10: 0

## 2024-01-15 NOTE — CARE COORDINATION
SOCIAL WORK / DISCHARGE PLANNING:  Pt continues to decline functionally. Psych saw today but recommends crushing psych meds that can be and using ng tube. Anticipate pt will need placement, [facility], do have psych at [facility]. Sw will follow.         Electronically signed by KVNG Castano on 1/15/2024 at 5:53 PM

## 2024-01-15 NOTE — CONSULTS
CONSULT  Venancio Wisdom M.D.  [facility]  Dr. Hernan Cavanaugh M.D.,  Dr. Sunny Soto M.D.,  Dr. Russ Landry D.O.,  Dr. Win Hebert D.O. ,  Dr. Ramón Michel M.D.,          Aureliano Falcon  68 y.o.  male      Re: \"CT scan results--emesis with any oral intake, abdominal distention hypoactive BS x4\"  Requesting physician: Dr. Gallo  Date:12:55 PM 1/11/2024          HPI: 68-year-old male patient seen in the hospital for above described issue.  Patient has been admitted since 8 January.  The patient apparently has been experiencing emesis with attempted eating yesterday.  He himself has significant history of psychiatric disorder and he is virtually not able to provide any complaints nor details in regards to his symptoms.  Speech therapy evaluationReports persistent belching    Information sources:     -medical record  -health care team    PMHx:  Past Medical History:   Diagnosis Date    Smoker 01/08/2024    4 cartons per month       PSHx:History reviewed. No pertinent surgical history.    Meds:  Current Facility-Administered Medications   Medication Dose Route Frequency Provider Last Rate Last Admin    LORazepam (ATIVAN) injection 0.5 mg  0.5 mg IntraVENous Q6H PRN Michael Gallo DO        nicotine (NICODERM CQ) 21 MG/24HR 1 patch  1 patch TransDERmal Daily Michael Gallo DO   1 patch at 01/11/24 1101    doxycycline (VIBRAMYCIN) 100 mg in sodium chloride 0.9 % 100 mL IVPB ([id number])  100 mg IntraVENous 2 times per day Michael Gallo DO   Stopped at 01/11/24 1208    sodium chloride flush 0.9 % injection 5-40 mL  5-40 mL IntraVENous 2 times per day Caesar Landry DO   10 mL at 01/10/24 2207    sodium chloride flush 0.9 % injection 5-40 mL  5-40 mL IntraVENous PRN Caesar Landry DO        sodium chloride flush 0.9 % injection 5-40 mL  5-40 mL IntraVENous 2 times per day Michael Gallo DO   10 mL at 01/11/24 1027    sodium chloride flush 0.9 % injection 5-40 mL  5-40 mL IntraVENous PRN 
Consult received for \"patient n.p.o. and is on multiple psych meds.\"    Chart reviewed. Patient admitted 1/8 with delirium, flu A, UTI and other medical problems.    Home psychotropic regimen consists of Clozaril, Klonopin and Cogentin.    Patient does have NG tube in place - my recommendation would be to crush the medications that can be crushed and administer them through the NG tube. If any are unable to be then they can just be resumed when patient is able to take pills again. No indication at this time for full consult.    Electronically signed by Dontae Mulligan MD on 1/15/2024 at 11:05 AM    
ICU Intensivist Attestation:    I saw, examined, and discussed the patient with Monica NAVARRO and agree with her assessment and plan.    I have reviewed the patient's CT scan of the chest been done.  The reason for his increasing oxygen needs relate to bilateral lower lobe infiltrates with consolidation.  The.  The patient is also somewhat dehydrated and requires some intravenous fluids.    He is currently on doxycycline and Zosyn but, because of the possibility of staph/MRSA, vancomycin will be added with dosing per pharmacy.    Additionally, the patient probably also has significant COPD and may benefit from several days of intravenous steroids.  Aerosolized bronchodilators will be continued every 4 hours.  Chest x-ray will be done tomorrow.    We will also check urine antigen for Legionella and strep pneumo.    Electronically signed by Antony Deng MD on 2024 at 5:43 PM    [facility]  Department of Internal Medicine  Division of Pulmonary, Critical Care and Sleep Medicine  Consult Note    Shay Villatoro DO, MPH, [facility], FAC, FACP  Brooklyn Sofia DO, [facility]  MD Zohaib Alvarado MD David Weiner, MD Bridget Nance, APRN        Patient: Aureliano Falcon  MRN: [id number]  : 1955    Encounter Time: 3:35 PM     Date of Admission: 2024  1:43 AM    Primary Care Physician: Toshia Perea APRN - CNP    Reason for Consultation: Worsening hypoxic respiratory failure     HISTORY OF PRESENT ILLNESS : Aureliano Falcon 68 y.o. male was seen in consultation regarding the above chief compliant. Mr. Falcon was admitted on 2024 after he presented to the ER with report of AMS. Aureliano is reportedly non verbal at baseline but does normally walk around the house. On arrival to ER he was found to be hypoxic at 85% on room air and placed on supplemental O2, no home O2 at baseline. He tested positive for influenza. We are consulted today for increasing 
Result   Low lung volumes with bronchovascular crowding bibasilar.         CT ABDOMEN PELVIS W IV CONTRAST Additional Contrast? None   Final Result   1. Fluid distension of small bowel loops and colon indicating diarrheal   illness. No evidence of bowel wall thickening or obstruction.   2. Mild mural thickening in the distal esophagus likely indicating acute   esophagitis.   3. Bladder wall thickening, even allowing for under distension. Clinical   correlation for evidence of cystitis is recommended.   4. Mild hepatic steatosis.   5. Mildly enlarged prostate gland.   6. Bibasilar pulmonary opacities which may represent atelectasis or pneumonia.         CT Head W/O Contrast   Final Result   No acute intracranial abnormality.         XR CHEST PORTABLE   Final Result   No acute process.         XR ABDOMEN FOR NG/OG/NE TUBE PLACEMENT    (Results Pending)   XR CHEST PORTABLE    (Results Pending)         Labs:    CBC:   Recent Labs     01/10/24  0827 01/11/24  0829   WBC 9.7 9.5   HGB 14.4 15.4    192        No results found for: \"IRON\", \"TIBC\", \"FERRITIN\"    Lab Results   Component Value Date    CALCIUM 8.9 01/12/2024    CALCIUM 8.7 01/11/2024    CALCIUM 8.6 01/10/2024    PHOS 2.3 (L) 01/09/2024    MG 2.2 01/09/2024       BMP:   Recent Labs     01/10/24  0827 01/11/24  0829 01/12/24  0507    147* 146   K 4.4 4.6 4.4    114* 115*   CO2 21* 18* 19*   BUN 21 33* 47*   CREATININE 1.1 1.3* 1.7*   GLUCOSE 120* 134* 197*             Labs:  CBC with Differential:    Lab Results   Component Value Date/Time    WBC 9.5 01/11/2024 08:29 AM    RBC 5.05 01/11/2024 08:29 AM    HGB 15.4 01/11/2024 08:29 AM    HCT 46.9 01/11/2024 08:29 AM     01/11/2024 08:29 AM    MCV 92.9 01/11/2024 08:29 AM    MCH 30.5 01/11/2024 08:29 AM    MCHC 32.8 01/11/2024 08:29 AM    RDW 14.6 01/11/2024 08:29 AM    LYMPHOPCT 10 01/11/2024 08:29 AM    MONOPCT 4 01/11/2024 08:29 AM    BASOPCT 0 01/11/2024 08:29 AM    MONOSABS 0.41

## 2024-01-16 ENCOUNTER — APPOINTMENT (OUTPATIENT)
Dept: CT IMAGING | Age: 69
DRG: 871 | End: 2024-01-16
Payer: OTHER GOVERNMENT

## 2024-01-16 ENCOUNTER — APPOINTMENT (OUTPATIENT)
Dept: GENERAL RADIOLOGY | Age: 69
DRG: 871 | End: 2024-01-16
Payer: OTHER GOVERNMENT

## 2024-01-16 LAB
ALBUMIN SERPL-MCNC: 2.6 G/DL (ref 3.5–5.2)
ALP SERPL-CCNC: 110 U/L (ref 40–129)
ALT SERPL-CCNC: 124 U/L (ref 0–40)
ANION GAP SERPL CALCULATED.3IONS-SCNC: 7 MMOL/L (ref 7–16)
AST SERPL-CCNC: 77 U/L (ref 0–39)
BILIRUB SERPL-MCNC: 0.8 MG/DL (ref 0–1.2)
BUN SERPL-MCNC: 29 MG/DL (ref 6–23)
CALCIUM SERPL-MCNC: 8.3 MG/DL (ref 8.6–10.2)
CHLORIDE SERPL-SCNC: 109 MMOL/L (ref 98–107)
CO2 SERPL-SCNC: 30 MMOL/L (ref 22–29)
CREAT SERPL-MCNC: 1.1 MG/DL (ref 0.7–1.2)
DATE LAST DOSE: NORMAL
ERYTHROCYTE [DISTWIDTH] IN BLOOD BY AUTOMATED COUNT: 13.9 % (ref 11.5–15)
GFR SERPL CREATININE-BSD FRML MDRD: >60 ML/MIN/1.73M2
GLUCOSE BLD-MCNC: 118 MG/DL (ref 74–99)
GLUCOSE BLD-MCNC: 122 MG/DL (ref 74–99)
GLUCOSE BLD-MCNC: 131 MG/DL (ref 74–99)
GLUCOSE BLD-MCNC: 160 MG/DL (ref 74–99)
GLUCOSE SERPL-MCNC: 186 MG/DL (ref 74–99)
HCT VFR BLD AUTO: 40.5 % (ref 37–54)
HGB BLD-MCNC: 13 G/DL (ref 12.5–16.5)
MAGNESIUM SERPL-MCNC: 2.4 MG/DL (ref 1.6–2.6)
MCH RBC QN AUTO: 30.5 PG (ref 26–35)
MCHC RBC AUTO-ENTMCNC: 32.1 G/DL (ref 32–34.5)
MCV RBC AUTO: 95.1 FL (ref 80–99.9)
MICROORGANISM SPEC CULT: NORMAL
PHOSPHATE SERPL-MCNC: 3 MG/DL (ref 2.5–4.5)
PLATELET # BLD AUTO: 341 K/UL (ref 130–450)
PMV BLD AUTO: 10.5 FL (ref 7–12)
POTASSIUM SERPL-SCNC: 4.1 MMOL/L (ref 3.5–5)
PROT SERPL-MCNC: 5.5 G/DL (ref 6.4–8.3)
RBC # BLD AUTO: 4.26 M/UL (ref 3.8–5.8)
SERVICE CMNT-IMP: NORMAL
SODIUM SERPL-SCNC: 146 MMOL/L (ref 132–146)
SPECIMEN DESCRIPTION: NORMAL
TME LAST DOSE: NORMAL H
VANCOMYCIN DOSE: NORMAL MG
VANCOMYCIN SERPL-MCNC: 6.6 UG/ML (ref 5–40)
WBC OTHER # BLD: 11 K/UL (ref 4.5–11.5)

## 2024-01-16 PROCEDURE — 99232 SBSQ HOSP IP/OBS MODERATE 35: CPT | Performed by: INTERNAL MEDICINE

## 2024-01-16 PROCEDURE — 6370000000 HC RX 637 (ALT 250 FOR IP): Performed by: INTERNAL MEDICINE

## 2024-01-16 PROCEDURE — 6360000002 HC RX W HCPCS: Performed by: INTERNAL MEDICINE

## 2024-01-16 PROCEDURE — 2500000003 HC RX 250 WO HCPCS: Performed by: INTERNAL MEDICINE

## 2024-01-16 PROCEDURE — 2580000003 HC RX 258

## 2024-01-16 PROCEDURE — 84100 ASSAY OF PHOSPHORUS: CPT

## 2024-01-16 PROCEDURE — 36415 COLL VENOUS BLD VENIPUNCTURE: CPT

## 2024-01-16 PROCEDURE — 6360000004 HC RX CONTRAST MEDICATION: Performed by: RADIOLOGY

## 2024-01-16 PROCEDURE — 83735 ASSAY OF MAGNESIUM: CPT

## 2024-01-16 PROCEDURE — 2580000003 HC RX 258: Performed by: INTERNAL MEDICINE

## 2024-01-16 PROCEDURE — 94640 AIRWAY INHALATION TREATMENT: CPT

## 2024-01-16 PROCEDURE — 74176 CT ABD & PELVIS W/O CONTRAST: CPT

## 2024-01-16 PROCEDURE — 85027 COMPLETE CBC AUTOMATED: CPT

## 2024-01-16 PROCEDURE — 6360000002 HC RX W HCPCS

## 2024-01-16 PROCEDURE — 82962 GLUCOSE BLOOD TEST: CPT

## 2024-01-16 PROCEDURE — 2060000000 HC ICU INTERMEDIATE R&B

## 2024-01-16 PROCEDURE — 2700000000 HC OXYGEN THERAPY PER DAY

## 2024-01-16 PROCEDURE — 80053 COMPREHEN METABOLIC PANEL: CPT

## 2024-01-16 PROCEDURE — 74018 RADEX ABDOMEN 1 VIEW: CPT

## 2024-01-16 PROCEDURE — 2580000003 HC RX 258: Performed by: STUDENT IN AN ORGANIZED HEALTH CARE EDUCATION/TRAINING PROGRAM

## 2024-01-16 PROCEDURE — 80202 ASSAY OF VANCOMYCIN: CPT

## 2024-01-16 RX ORDER — CLONAZEPAM 0.5 MG/1
1.5 TABLET, ORALLY DISINTEGRATING ORAL NIGHTLY
Status: DISCONTINUED | OUTPATIENT
Start: 2024-01-16 | End: 2024-01-31 | Stop reason: HOSPADM

## 2024-01-16 RX ORDER — CLONAZEPAM 0.5 MG/1
0.25 TABLET, ORALLY DISINTEGRATING ORAL DAILY
Status: DISCONTINUED | OUTPATIENT
Start: 2024-01-16 | End: 2024-01-16 | Stop reason: SDUPTHER

## 2024-01-16 RX ORDER — CLONAZEPAM 0.5 MG/1
0.25 TABLET, ORALLY DISINTEGRATING ORAL DAILY
Status: DISCONTINUED | OUTPATIENT
Start: 2024-01-17 | End: 2024-01-31 | Stop reason: HOSPADM

## 2024-01-16 RX ORDER — CLONAZEPAM 0.5 MG/1
0.5 TABLET, ORALLY DISINTEGRATING ORAL EVERY MORNING
Status: DISCONTINUED | OUTPATIENT
Start: 2024-01-17 | End: 2024-01-31 | Stop reason: HOSPADM

## 2024-01-16 RX ADMIN — IPRATROPIUM BROMIDE AND ALBUTEROL SULFATE 1 DOSE: .5; 2.5 SOLUTION RESPIRATORY (INHALATION) at 04:52

## 2024-01-16 RX ADMIN — BENZTROPINE MESYLATE 2 MG: 1 TABLET ORAL at 22:19

## 2024-01-16 RX ADMIN — CLONAZEPAM 1.5 MG: 0.5 TABLET, ORALLY DISINTEGRATING ORAL at 22:20

## 2024-01-16 RX ADMIN — IPRATROPIUM BROMIDE AND ALBUTEROL SULFATE 1 DOSE: .5; 2.5 SOLUTION RESPIRATORY (INHALATION) at 01:20

## 2024-01-16 RX ADMIN — PIPERACILLIN AND TAZOBACTAM 3375 MG: 3; .375 INJECTION, POWDER, FOR SOLUTION INTRAVENOUS at 06:06

## 2024-01-16 RX ADMIN — Medication 10 ML: at 08:55

## 2024-01-16 RX ADMIN — CLONAZEPAM 0.5 MG: 0.5 TABLET ORAL at 09:00

## 2024-01-16 RX ADMIN — SODIUM CHLORIDE SOLN NEBU 3% 4 ML: 3 NEBU SOLN at 04:52

## 2024-01-16 RX ADMIN — DOXYCYCLINE 100 MG: 100 INJECTION, POWDER, LYOPHILIZED, FOR SOLUTION INTRAVENOUS at 11:19

## 2024-01-16 RX ADMIN — BUDESONIDE INHALATION 500 MCG: 0.5 SUSPENSION RESPIRATORY (INHALATION) at 04:52

## 2024-01-16 RX ADMIN — WATER 40 MG: 1 INJECTION INTRAMUSCULAR; INTRAVENOUS; SUBCUTANEOUS at 09:01

## 2024-01-16 RX ADMIN — CLOZAPINE 100 MG: 100 TABLET ORAL at 09:00

## 2024-01-16 RX ADMIN — IOPAMIDOL 18 ML: 755 INJECTION, SOLUTION INTRAVENOUS at 20:08

## 2024-01-16 RX ADMIN — CLOZAPINE 200 MG: 100 TABLET ORAL at 22:19

## 2024-01-16 RX ADMIN — BENZTROPINE MESYLATE 2 MG: 1 TABLET ORAL at 09:01

## 2024-01-16 RX ADMIN — IPRATROPIUM BROMIDE AND ALBUTEROL SULFATE 1 DOSE: .5; 2.5 SOLUTION RESPIRATORY (INHALATION) at 09:18

## 2024-01-16 RX ADMIN — PIPERACILLIN AND TAZOBACTAM 3375 MG: 3; .375 INJECTION, POWDER, FOR SOLUTION INTRAVENOUS at 15:11

## 2024-01-16 RX ADMIN — ENOXAPARIN SODIUM 40 MG: 100 INJECTION SUBCUTANEOUS at 08:58

## 2024-01-16 RX ADMIN — PIPERACILLIN AND TAZOBACTAM 3375 MG: 3; .375 INJECTION, POWDER, FOR SOLUTION INTRAVENOUS at 21:27

## 2024-01-16 RX ADMIN — IPRATROPIUM BROMIDE AND ALBUTEROL SULFATE 1 DOSE: .5; 2.5 SOLUTION RESPIRATORY (INHALATION) at 13:59

## 2024-01-16 RX ADMIN — Medication 10 ML: at 08:53

## 2024-01-16 RX ADMIN — DEXTROSE MONOHYDRATE: 50 INJECTION, SOLUTION INTRAVENOUS at 06:05

## 2024-01-16 RX ADMIN — Medication 10 ML: at 20:45

## 2024-01-16 RX ADMIN — DEXTROSE MONOHYDRATE: 50 INJECTION, SOLUTION INTRAVENOUS at 19:15

## 2024-01-16 RX ADMIN — VANCOMYCIN HYDROCHLORIDE 1250 MG: 10 INJECTION, POWDER, LYOPHILIZED, FOR SOLUTION INTRAVENOUS at 22:30

## 2024-01-16 RX ADMIN — CLONAZEPAM 0.25 MG: 0.5 TABLET ORAL at 15:11

## 2024-01-16 ASSESSMENT — PAIN SCALES - PAIN ASSESSMENT IN ADVANCED DEMENTIA (PAINAD)
BODYLANGUAGE: 1
FACIALEXPRESSION: 0
CONSOLABILITY: 0
BREATHING: 0
TOTALSCORE: 1
BREATHING: 0
CONSOLABILITY: 0
TOTALSCORE: 0
FACIALEXPRESSION: 0
BODYLANGUAGE: 0
NEGVOCALIZATION: 0
NEGVOCALIZATION: 0

## 2024-01-16 ASSESSMENT — PAIN SCALES - GENERAL
PAINLEVEL_OUTOF10: 0

## 2024-01-16 ASSESSMENT — PAIN SCALES - WONG BAKER: WONGBAKER_NUMERICALRESPONSE: 0

## 2024-01-16 NOTE — CARE COORDINATION
SOCIAL WORK / DISCHARGE PLANNING:  Jenifer discussed case with Dr Gallo, stated could consider transfer to [facility]. [facility] and left message as well as faxed medical records to [phone] for review. Pt continues with ng tube with output. IV Zosyn. 6L O2. Pt had not been able to have his psych meds of Clozaril and Klonopin. Pt usually resides with his elderly parents, he was ambulatory but they managed his meds and ADLS. Pt is minimally responsive verbally. Jenifer will follow.     Addendum: 428pm. Jenifer received call from Becca at [facility], they do not have Geropsych if needed. Pt would have to utilize his Medicare Part A and B - 1 [id number].  She states Erasmo is assigned case and will follow up with this Sw in next day or so.     Electronically signed by KVNG Castano on 1/16/2024 at 4:01 PM

## 2024-01-17 LAB
GLUCOSE BLD-MCNC: 127 MG/DL (ref 74–99)
GLUCOSE BLD-MCNC: 128 MG/DL (ref 74–99)
GLUCOSE BLD-MCNC: 141 MG/DL (ref 74–99)
GLUCOSE BLD-MCNC: 181 MG/DL (ref 74–99)
MICROORGANISM SPEC CULT: NORMAL
SERVICE CMNT-IMP: NORMAL
SPECIMEN DESCRIPTION: NORMAL

## 2024-01-17 PROCEDURE — 6370000000 HC RX 637 (ALT 250 FOR IP): Performed by: INTERNAL MEDICINE

## 2024-01-17 PROCEDURE — 6360000002 HC RX W HCPCS: Performed by: HOSPITALIST

## 2024-01-17 PROCEDURE — 2580000003 HC RX 258: Performed by: INTERNAL MEDICINE

## 2024-01-17 PROCEDURE — 2700000000 HC OXYGEN THERAPY PER DAY

## 2024-01-17 PROCEDURE — 92526 ORAL FUNCTION THERAPY: CPT | Performed by: SPEECH-LANGUAGE PATHOLOGIST

## 2024-01-17 PROCEDURE — 6360000002 HC RX W HCPCS: Performed by: INTERNAL MEDICINE

## 2024-01-17 PROCEDURE — 2580000003 HC RX 258

## 2024-01-17 PROCEDURE — 2580000003 HC RX 258: Performed by: STUDENT IN AN ORGANIZED HEALTH CARE EDUCATION/TRAINING PROGRAM

## 2024-01-17 PROCEDURE — 6360000002 HC RX W HCPCS

## 2024-01-17 PROCEDURE — 99232 SBSQ HOSP IP/OBS MODERATE 35: CPT | Performed by: INTERNAL MEDICINE

## 2024-01-17 PROCEDURE — 2060000000 HC ICU INTERMEDIATE R&B

## 2024-01-17 PROCEDURE — 97110 THERAPEUTIC EXERCISES: CPT

## 2024-01-17 PROCEDURE — 94640 AIRWAY INHALATION TREATMENT: CPT

## 2024-01-17 PROCEDURE — 82962 GLUCOSE BLOOD TEST: CPT

## 2024-01-17 PROCEDURE — 97530 THERAPEUTIC ACTIVITIES: CPT

## 2024-01-17 RX ORDER — METOCLOPRAMIDE HYDROCHLORIDE 5 MG/ML
10 INJECTION INTRAMUSCULAR; INTRAVENOUS EVERY 6 HOURS
Status: DISCONTINUED | OUTPATIENT
Start: 2024-01-17 | End: 2024-01-29 | Stop reason: CLARIF

## 2024-01-17 RX ORDER — IPRATROPIUM BROMIDE AND ALBUTEROL SULFATE 2.5; .5 MG/3ML; MG/3ML
1 SOLUTION RESPIRATORY (INHALATION)
Status: DISCONTINUED | OUTPATIENT
Start: 2024-01-18 | End: 2024-01-31 | Stop reason: HOSPADM

## 2024-01-17 RX ADMIN — DEXTROSE MONOHYDRATE: 50 INJECTION, SOLUTION INTRAVENOUS at 16:10

## 2024-01-17 RX ADMIN — SODIUM CHLORIDE SOLN NEBU 3% 4 ML: 3 NEBU SOLN at 06:46

## 2024-01-17 RX ADMIN — PIPERACILLIN AND TAZOBACTAM 3375 MG: 3; .375 INJECTION, POWDER, FOR SOLUTION INTRAVENOUS at 22:31

## 2024-01-17 RX ADMIN — PIPERACILLIN AND TAZOBACTAM 3375 MG: 3; .375 INJECTION, POWDER, FOR SOLUTION INTRAVENOUS at 05:30

## 2024-01-17 RX ADMIN — Medication 10 ML: at 10:47

## 2024-01-17 RX ADMIN — METOCLOPRAMIDE 10 MG: 5 INJECTION, SOLUTION INTRAMUSCULAR; INTRAVENOUS at 16:11

## 2024-01-17 RX ADMIN — CLONAZEPAM 1.5 MG: 0.5 TABLET, ORALLY DISINTEGRATING ORAL at 21:24

## 2024-01-17 RX ADMIN — METOCLOPRAMIDE 10 MG: 5 INJECTION, SOLUTION INTRAMUSCULAR; INTRAVENOUS at 21:25

## 2024-01-17 RX ADMIN — BUDESONIDE INHALATION 500 MCG: 0.5 SUSPENSION RESPIRATORY (INHALATION) at 06:45

## 2024-01-17 RX ADMIN — DEXTROSE MONOHYDRATE: 50 INJECTION, SOLUTION INTRAVENOUS at 05:42

## 2024-01-17 RX ADMIN — VANCOMYCIN HYDROCHLORIDE 1250 MG: 10 INJECTION, POWDER, LYOPHILIZED, FOR SOLUTION INTRAVENOUS at 18:37

## 2024-01-17 RX ADMIN — CLOZAPINE 100 MG: 100 TABLET ORAL at 10:49

## 2024-01-17 RX ADMIN — Medication 10 ML: at 21:25

## 2024-01-17 RX ADMIN — BENZTROPINE MESYLATE 2 MG: 1 TABLET ORAL at 21:23

## 2024-01-17 RX ADMIN — BUDESONIDE INHALATION 500 MCG: 0.5 SUSPENSION RESPIRATORY (INHALATION) at 18:38

## 2024-01-17 RX ADMIN — PIPERACILLIN AND TAZOBACTAM 3375 MG: 3; .375 INJECTION, POWDER, FOR SOLUTION INTRAVENOUS at 14:14

## 2024-01-17 RX ADMIN — CLONAZEPAM 0.5 MG: 0.5 TABLET, ORALLY DISINTEGRATING ORAL at 10:00

## 2024-01-17 RX ADMIN — ENOXAPARIN SODIUM 40 MG: 100 INJECTION SUBCUTANEOUS at 10:57

## 2024-01-17 RX ADMIN — Medication 10 ML: at 10:48

## 2024-01-17 RX ADMIN — BENZTROPINE MESYLATE 2 MG: 1 TABLET ORAL at 10:50

## 2024-01-17 RX ADMIN — CLONAZEPAM 0.25 MG: 0.5 TABLET, ORALLY DISINTEGRATING ORAL at 14:15

## 2024-01-17 RX ADMIN — IPRATROPIUM BROMIDE AND ALBUTEROL SULFATE 1 DOSE: .5; 2.5 SOLUTION RESPIRATORY (INHALATION) at 18:38

## 2024-01-17 RX ADMIN — WATER 40 MG: 1 INJECTION INTRAMUSCULAR; INTRAVENOUS; SUBCUTANEOUS at 10:58

## 2024-01-17 RX ADMIN — IPRATROPIUM BROMIDE AND ALBUTEROL SULFATE 1 DOSE: .5; 2.5 SOLUTION RESPIRATORY (INHALATION) at 09:36

## 2024-01-17 RX ADMIN — METOCLOPRAMIDE 10 MG: 5 INJECTION, SOLUTION INTRAMUSCULAR; INTRAVENOUS at 11:02

## 2024-01-17 RX ADMIN — IPRATROPIUM BROMIDE AND ALBUTEROL SULFATE 1 DOSE: .5; 2.5 SOLUTION RESPIRATORY (INHALATION) at 14:35

## 2024-01-17 RX ADMIN — SODIUM CHLORIDE SOLN NEBU 3% 4 ML: 3 NEBU SOLN at 18:38

## 2024-01-17 RX ADMIN — IPRATROPIUM BROMIDE AND ALBUTEROL SULFATE 1 DOSE: .5; 2.5 SOLUTION RESPIRATORY (INHALATION) at 06:45

## 2024-01-17 RX ADMIN — CLOZAPINE 200 MG: 100 TABLET ORAL at 21:24

## 2024-01-17 ASSESSMENT — PAIN SCALES - PAIN ASSESSMENT IN ADVANCED DEMENTIA (PAINAD)
FACIALEXPRESSION: 0
TOTALSCORE: 0
NEGVOCALIZATION: 0
BODYLANGUAGE: 0
BREATHING: 0
CONSOLABILITY: 0

## 2024-01-17 ASSESSMENT — PAIN SCALES - GENERAL: PAINLEVEL_OUTOF10: 0

## 2024-01-17 NOTE — CARE COORDINATION
SOCIAL WORK / DISCHARGE PLANNING:  Pt continues with 1:1 sitter, ng tube with sig output. 10 LHF O2. IV Solumedrol/ IV Zosyn, IV Vanco. Jenifer spoke with Tammy HUTTON if pt would be able to considered for LTAC. She states that has to go through the [facility] as well. Pt also has Medicare coverage now so if not approved from [facility] , this could be considered. Pt minimally responsive at baseline, elderly parents care for him. Pt is 100% service connected. Jenifer will follow, assist with further dc planning.             Electronically signed by KVNG Castano on 1/17/2024 at 4:34 PM

## 2024-01-18 ENCOUNTER — APPOINTMENT (OUTPATIENT)
Dept: ULTRASOUND IMAGING | Age: 69
DRG: 871 | End: 2024-01-18
Payer: OTHER GOVERNMENT

## 2024-01-18 ENCOUNTER — APPOINTMENT (OUTPATIENT)
Dept: GENERAL RADIOLOGY | Age: 69
DRG: 871 | End: 2024-01-18
Payer: OTHER GOVERNMENT

## 2024-01-18 LAB
ALBUMIN SERPL-MCNC: 2.5 G/DL (ref 3.5–5.2)
ALP SERPL-CCNC: 115 U/L (ref 40–129)
ALT SERPL-CCNC: 175 U/L (ref 0–40)
ANION GAP SERPL CALCULATED.3IONS-SCNC: 8 MMOL/L (ref 7–16)
AST SERPL-CCNC: 92 U/L (ref 0–39)
BILIRUB SERPL-MCNC: 0.7 MG/DL (ref 0–1.2)
BUN SERPL-MCNC: 20 MG/DL (ref 6–23)
CALCIUM SERPL-MCNC: 8.6 MG/DL (ref 8.6–10.2)
CHLORIDE SERPL-SCNC: 91 MMOL/L (ref 98–107)
CO2 SERPL-SCNC: 37 MMOL/L (ref 22–29)
CREAT SERPL-MCNC: 1 MG/DL (ref 0.7–1.2)
DATE LAST DOSE: NORMAL
ERYTHROCYTE [DISTWIDTH] IN BLOOD BY AUTOMATED COUNT: 13.1 % (ref 11.5–15)
GFR SERPL CREATININE-BSD FRML MDRD: >60 ML/MIN/1.73M2
GLUCOSE BLD-MCNC: 138 MG/DL (ref 74–99)
GLUCOSE BLD-MCNC: 142 MG/DL (ref 74–99)
GLUCOSE BLD-MCNC: 158 MG/DL (ref 74–99)
GLUCOSE BLD-MCNC: 164 MG/DL (ref 74–99)
GLUCOSE SERPL-MCNC: 155 MG/DL (ref 74–99)
HCT VFR BLD AUTO: 42.8 % (ref 37–54)
HGB BLD-MCNC: 14.4 G/DL (ref 12.5–16.5)
MAGNESIUM SERPL-MCNC: 2.3 MG/DL (ref 1.6–2.6)
MCH RBC QN AUTO: 30.9 PG (ref 26–35)
MCHC RBC AUTO-ENTMCNC: 33.6 G/DL (ref 32–34.5)
MCV RBC AUTO: 91.8 FL (ref 80–99.9)
PHOSPHATE SERPL-MCNC: 2.5 MG/DL (ref 2.5–4.5)
PLATELET # BLD AUTO: 344 K/UL (ref 130–450)
PMV BLD AUTO: 10.6 FL (ref 7–12)
POTASSIUM SERPL-SCNC: 2.9 MMOL/L (ref 3.5–5)
PROT SERPL-MCNC: 5.5 G/DL (ref 6.4–8.3)
RBC # BLD AUTO: 4.66 M/UL (ref 3.8–5.8)
SODIUM SERPL-SCNC: 136 MMOL/L (ref 132–146)
TME LAST DOSE: NORMAL H
VANCOMYCIN DOSE: NORMAL MG
VANCOMYCIN SERPL-MCNC: 13.3 UG/ML (ref 5–40)
WBC OTHER # BLD: 13.4 K/UL (ref 4.5–11.5)

## 2024-01-18 PROCEDURE — 99232 SBSQ HOSP IP/OBS MODERATE 35: CPT | Performed by: INTERNAL MEDICINE

## 2024-01-18 PROCEDURE — 76705 ECHO EXAM OF ABDOMEN: CPT

## 2024-01-18 PROCEDURE — 80053 COMPREHEN METABOLIC PANEL: CPT

## 2024-01-18 PROCEDURE — 2580000003 HC RX 258: Performed by: INTERNAL MEDICINE

## 2024-01-18 PROCEDURE — 6360000002 HC RX W HCPCS: Performed by: INTERNAL MEDICINE

## 2024-01-18 PROCEDURE — 6370000000 HC RX 637 (ALT 250 FOR IP): Performed by: INTERNAL MEDICINE

## 2024-01-18 PROCEDURE — 97110 THERAPEUTIC EXERCISES: CPT

## 2024-01-18 PROCEDURE — 2060000000 HC ICU INTERMEDIATE R&B

## 2024-01-18 PROCEDURE — 83735 ASSAY OF MAGNESIUM: CPT

## 2024-01-18 PROCEDURE — 82962 GLUCOSE BLOOD TEST: CPT

## 2024-01-18 PROCEDURE — 80202 ASSAY OF VANCOMYCIN: CPT

## 2024-01-18 PROCEDURE — 2580000003 HC RX 258: Performed by: STUDENT IN AN ORGANIZED HEALTH CARE EDUCATION/TRAINING PROGRAM

## 2024-01-18 PROCEDURE — 94640 AIRWAY INHALATION TREATMENT: CPT

## 2024-01-18 PROCEDURE — 2580000003 HC RX 258

## 2024-01-18 PROCEDURE — 6360000002 HC RX W HCPCS

## 2024-01-18 PROCEDURE — 85027 COMPLETE CBC AUTOMATED: CPT

## 2024-01-18 PROCEDURE — 84100 ASSAY OF PHOSPHORUS: CPT

## 2024-01-18 PROCEDURE — 2700000000 HC OXYGEN THERAPY PER DAY

## 2024-01-18 PROCEDURE — 74018 RADEX ABDOMEN 1 VIEW: CPT

## 2024-01-18 PROCEDURE — 6360000002 HC RX W HCPCS: Performed by: HOSPITALIST

## 2024-01-18 PROCEDURE — 6360000002 HC RX W HCPCS: Performed by: NURSE PRACTITIONER

## 2024-01-18 PROCEDURE — 36415 COLL VENOUS BLD VENIPUNCTURE: CPT

## 2024-01-18 RX ORDER — POTASSIUM CHLORIDE 7.45 MG/ML
10 INJECTION INTRAVENOUS
Status: DISPENSED | OUTPATIENT
Start: 2024-01-18 | End: 2024-01-18

## 2024-01-18 RX ORDER — POTASSIUM CHLORIDE 7.45 MG/ML
10 INJECTION INTRAVENOUS ONCE
Status: COMPLETED | OUTPATIENT
Start: 2024-01-18 | End: 2024-01-18

## 2024-01-18 RX ADMIN — IPRATROPIUM BROMIDE AND ALBUTEROL SULFATE 1 DOSE: .5; 2.5 SOLUTION RESPIRATORY (INHALATION) at 17:05

## 2024-01-18 RX ADMIN — PIPERACILLIN AND TAZOBACTAM 3375 MG: 3; .375 INJECTION, POWDER, FOR SOLUTION INTRAVENOUS at 16:01

## 2024-01-18 RX ADMIN — METOCLOPRAMIDE 10 MG: 5 INJECTION, SOLUTION INTRAMUSCULAR; INTRAVENOUS at 03:36

## 2024-01-18 RX ADMIN — BUDESONIDE INHALATION 500 MCG: 0.5 SUSPENSION RESPIRATORY (INHALATION) at 05:37

## 2024-01-18 RX ADMIN — BENZTROPINE MESYLATE 2 MG: 1 TABLET ORAL at 09:38

## 2024-01-18 RX ADMIN — Medication 10 ML: at 21:11

## 2024-01-18 RX ADMIN — Medication 10 ML: at 09:30

## 2024-01-18 RX ADMIN — CLOZAPINE 200 MG: 100 TABLET ORAL at 21:11

## 2024-01-18 RX ADMIN — POTASSIUM CHLORIDE 10 MEQ: 7.46 INJECTION, SOLUTION INTRAVENOUS at 13:14

## 2024-01-18 RX ADMIN — IPRATROPIUM BROMIDE AND ALBUTEROL SULFATE 1 DOSE: .5; 2.5 SOLUTION RESPIRATORY (INHALATION) at 09:43

## 2024-01-18 RX ADMIN — ENOXAPARIN SODIUM 40 MG: 100 INJECTION SUBCUTANEOUS at 09:30

## 2024-01-18 RX ADMIN — POTASSIUM CHLORIDE 10 MEQ: 7.46 INJECTION, SOLUTION INTRAVENOUS at 12:08

## 2024-01-18 RX ADMIN — SODIUM CHLORIDE SOLN NEBU 3% 4 ML: 3 NEBU SOLN at 05:36

## 2024-01-18 RX ADMIN — CLOZAPINE 100 MG: 100 TABLET ORAL at 09:38

## 2024-01-18 RX ADMIN — WATER 40 MG: 1 INJECTION INTRAMUSCULAR; INTRAVENOUS; SUBCUTANEOUS at 09:33

## 2024-01-18 RX ADMIN — SODIUM CHLORIDE SOLN NEBU 3% 4 ML: 3 NEBU SOLN at 17:05

## 2024-01-18 RX ADMIN — METOCLOPRAMIDE 10 MG: 5 INJECTION, SOLUTION INTRAMUSCULAR; INTRAVENOUS at 17:04

## 2024-01-18 RX ADMIN — BENZTROPINE MESYLATE 2 MG: 1 TABLET ORAL at 21:11

## 2024-01-18 RX ADMIN — CLONAZEPAM 0.5 MG: 0.5 TABLET, ORALLY DISINTEGRATING ORAL at 09:38

## 2024-01-18 RX ADMIN — METOCLOPRAMIDE 10 MG: 5 INJECTION, SOLUTION INTRAMUSCULAR; INTRAVENOUS at 09:34

## 2024-01-18 RX ADMIN — LORAZEPAM 0.5 MG: 2 INJECTION INTRAMUSCULAR; INTRAVENOUS at 15:43

## 2024-01-18 RX ADMIN — DEXTROSE MONOHYDRATE: 50 INJECTION, SOLUTION INTRAVENOUS at 02:43

## 2024-01-18 RX ADMIN — IPRATROPIUM BROMIDE AND ALBUTEROL SULFATE 1 DOSE: .5; 2.5 SOLUTION RESPIRATORY (INHALATION) at 13:25

## 2024-01-18 RX ADMIN — CLONAZEPAM 0.25 MG: 0.5 TABLET, ORALLY DISINTEGRATING ORAL at 17:02

## 2024-01-18 RX ADMIN — SODIUM CHLORIDE, PRESERVATIVE FREE 10 ML: 5 INJECTION INTRAVENOUS at 03:36

## 2024-01-18 RX ADMIN — POTASSIUM CHLORIDE 10 MEQ: 7.46 INJECTION, SOLUTION INTRAVENOUS at 15:02

## 2024-01-18 RX ADMIN — METOCLOPRAMIDE 10 MG: 5 INJECTION, SOLUTION INTRAMUSCULAR; INTRAVENOUS at 21:11

## 2024-01-18 RX ADMIN — CLONAZEPAM 1.5 MG: 0.5 TABLET, ORALLY DISINTEGRATING ORAL at 21:11

## 2024-01-18 RX ADMIN — VANCOMYCIN HYDROCHLORIDE 1250 MG: 10 INJECTION, POWDER, LYOPHILIZED, FOR SOLUTION INTRAVENOUS at 09:44

## 2024-01-18 RX ADMIN — PIPERACILLIN AND TAZOBACTAM 3375 MG: 3; .375 INJECTION, POWDER, FOR SOLUTION INTRAVENOUS at 06:07

## 2024-01-18 RX ADMIN — IPRATROPIUM BROMIDE AND ALBUTEROL SULFATE 1 DOSE: .5; 2.5 SOLUTION RESPIRATORY (INHALATION) at 05:37

## 2024-01-18 RX ADMIN — BUDESONIDE INHALATION 500 MCG: 0.5 SUSPENSION RESPIRATORY (INHALATION) at 17:05

## 2024-01-18 RX ADMIN — PIPERACILLIN AND TAZOBACTAM 3375 MG: 3; .375 INJECTION, POWDER, FOR SOLUTION INTRAVENOUS at 21:53

## 2024-01-18 ASSESSMENT — PAIN SCALES - GENERAL: PAINLEVEL_OUTOF10: 6

## 2024-01-18 NOTE — RT PROTOCOL NOTE
RT Nebulizer Bronchodilator Protocol Note    There is a bronchodilator order in the chart from a provider indicating to follow the RT Bronchodilator Protocol and there is an “Initiate RT Bronchodilator Protocol” order as well (see protocol at bottom of note).    CXR Findings:  No results found.    The findings from the last RT Protocol Assessment were as follows:  Smoking: None or smoker <15 pack years  Respiratory Pattern: Dyspnea on exertion or RR 21-25 bpm  Breath Sounds: Slightly diminished and/or crackles  Cough: Strong, spontaneous, non-productive  Indication for Bronchodilator Therapy: None  Bronchodilator Assessment Score: 4    Aerosolized bronchodilator medication orders have been revised according to the RT Nebulizer Bronchodilator Protocol below.    Respiratory Therapist to perform RT Therapy Protocol Assessment initially then follow the protocol.  Repeat RT Therapy Protocol Assessment PRN for score 0-3 or on second treatment, BID, and PRN for scores above 3.    No Indications - adjust the frequency to every 6 hours PRN wheezing or bronchospasm, if no treatments needed after 48 hours then discontinue using Per Protocol order mode.     If indication present, adjust the RT bronchodilator orders based on the Bronchodilator Assessment Score as indicated below.  If a patient is on this medication at home then do not decrease Frequency below that used at home.    0-3 - enter or revise RT bronchodilator order(s) to equivalent RT Bronchodilator order with Frequency of every 4 hours PRN for wheezing or increased work of breathing using Per Protocol order mode.       4-6 - enter or revise RT Bronchodilator order(s) to two equivalent RT bronchodilator orders with one order with BID Frequency and one order with Frequency of every 4 hours PRN wheezing or increased work of breathing using Per Protocol order mode.         7-10 - enter or revise RT Bronchodilator order(s) to two equivalent RT bronchodilator orders with

## 2024-01-18 NOTE — CARE COORDINATION
SOCIAL WORK / DISCHARGE PLANNING:  Jenifer [facility] with [facility] [phone] ext [phone]. Provided update, there are no beds at [facility]. He also states that [facility] does not cover at LTAC level of care anymore. Pt does have Medicare if we would pursue LTAC. Pt continues with Ng tube with output and 8L HFO2. Jenifer will follow and assist with further dc planning.             Electronically signed by KVNG Castano on 1/18/2024 at 4:05 PM

## 2024-01-18 NOTE — ACP (ADVANCE CARE PLANNING)
Advance Care Planning   Healthcare Decision Maker:    Primary Decision Maker: Lashanda Falcon - Legal Guardian, Legal Guardian - [phone]    Secondary Decision Maker: Tobi Falcon - Brother/Sister - [phone]    Click here to complete Healthcare Decision Makers including selection of the Healthcare Decision Maker Relationship (ie \"Primary\").  Today we documented Decision Maker(s) consistent with ACP documents on file.

## 2024-01-19 ENCOUNTER — ANESTHESIA EVENT (OUTPATIENT)
Dept: ENDOSCOPY | Age: 69
End: 2024-01-19
Payer: OTHER GOVERNMENT

## 2024-01-19 ENCOUNTER — ANESTHESIA (OUTPATIENT)
Dept: ENDOSCOPY | Age: 69
End: 2024-01-19
Payer: OTHER GOVERNMENT

## 2024-01-19 LAB
ALBUMIN SERPL-MCNC: 2.8 G/DL (ref 3.5–5.2)
ALP SERPL-CCNC: 120 U/L (ref 40–129)
ALT SERPL-CCNC: 226 U/L (ref 0–40)
AMMONIA PLAS-SCNC: 24 UMOL/L (ref 16–60)
ANION GAP SERPL CALCULATED.3IONS-SCNC: 6 MMOL/L (ref 7–16)
AST SERPL-CCNC: 100 U/L (ref 0–39)
BILIRUB SERPL-MCNC: 0.8 MG/DL (ref 0–1.2)
BUN SERPL-MCNC: 19 MG/DL (ref 6–23)
CALCIUM SERPL-MCNC: 8.4 MG/DL (ref 8.6–10.2)
CHLORIDE SERPL-SCNC: 89 MMOL/L (ref 98–107)
CO2 SERPL-SCNC: 42 MMOL/L (ref 22–29)
CREAT SERPL-MCNC: 1.3 MG/DL (ref 0.7–1.2)
ERYTHROCYTE [DISTWIDTH] IN BLOOD BY AUTOMATED COUNT: 13.1 % (ref 11.5–15)
GFR SERPL CREATININE-BSD FRML MDRD: 58 ML/MIN/1.73M2
GLUCOSE BLD-MCNC: 136 MG/DL (ref 74–99)
GLUCOSE BLD-MCNC: 143 MG/DL (ref 74–99)
GLUCOSE BLD-MCNC: 153 MG/DL (ref 74–99)
GLUCOSE BLD-MCNC: 155 MG/DL (ref 74–99)
GLUCOSE SERPL-MCNC: 135 MG/DL (ref 74–99)
HCT VFR BLD AUTO: 43.5 % (ref 37–54)
HGB BLD-MCNC: 14.3 G/DL (ref 12.5–16.5)
INR PPP: 1.2
MCH RBC QN AUTO: 30.6 PG (ref 26–35)
MCHC RBC AUTO-ENTMCNC: 32.9 G/DL (ref 32–34.5)
MCV RBC AUTO: 92.9 FL (ref 80–99.9)
PLATELET # BLD AUTO: 360 K/UL (ref 130–450)
PMV BLD AUTO: 10.4 FL (ref 7–12)
POTASSIUM SERPL-SCNC: 3.3 MMOL/L (ref 3.5–5)
PROT SERPL-MCNC: 5.6 G/DL (ref 6.4–8.3)
PROTHROMBIN TIME: 13.6 SEC (ref 9.3–12.4)
RBC # BLD AUTO: 4.68 M/UL (ref 3.8–5.8)
SODIUM SERPL-SCNC: 137 MMOL/L (ref 132–146)
WBC OTHER # BLD: 12.8 K/UL (ref 4.5–11.5)

## 2024-01-19 PROCEDURE — 88305 TISSUE EXAM BY PATHOLOGIST: CPT

## 2024-01-19 PROCEDURE — A4216 STERILE WATER/SALINE, 10 ML: HCPCS | Performed by: NURSE PRACTITIONER

## 2024-01-19 PROCEDURE — 0DB48ZX EXCISION OF ESOPHAGOGASTRIC JUNCTION, VIA NATURAL OR ARTIFICIAL OPENING ENDOSCOPIC, DIAGNOSTIC: ICD-10-PCS | Performed by: INTERNAL MEDICINE

## 2024-01-19 PROCEDURE — 6370000000 HC RX 637 (ALT 250 FOR IP): Performed by: INTERNAL MEDICINE

## 2024-01-19 PROCEDURE — 99232 SBSQ HOSP IP/OBS MODERATE 35: CPT | Performed by: INTERNAL MEDICINE

## 2024-01-19 PROCEDURE — 6360000002 HC RX W HCPCS: Performed by: NURSE PRACTITIONER

## 2024-01-19 PROCEDURE — 85610 PROTHROMBIN TIME: CPT

## 2024-01-19 PROCEDURE — 88312 SPECIAL STAINS GROUP 1: CPT

## 2024-01-19 PROCEDURE — 82140 ASSAY OF AMMONIA: CPT

## 2024-01-19 PROCEDURE — 6360000002 HC RX W HCPCS: Performed by: INTERNAL MEDICINE

## 2024-01-19 PROCEDURE — 2580000003 HC RX 258

## 2024-01-19 PROCEDURE — 2580000003 HC RX 258: Performed by: INTERNAL MEDICINE

## 2024-01-19 PROCEDURE — 85027 COMPLETE CBC AUTOMATED: CPT

## 2024-01-19 PROCEDURE — 2580000003 HC RX 258: Performed by: NURSE PRACTITIONER

## 2024-01-19 PROCEDURE — 2060000000 HC ICU INTERMEDIATE R&B

## 2024-01-19 PROCEDURE — 80053 COMPREHEN METABOLIC PANEL: CPT

## 2024-01-19 PROCEDURE — 36415 COLL VENOUS BLD VENIPUNCTURE: CPT

## 2024-01-19 PROCEDURE — 6360000002 HC RX W HCPCS: Performed by: NURSE ANESTHETIST, CERTIFIED REGISTERED

## 2024-01-19 PROCEDURE — 3700000000 HC ANESTHESIA ATTENDED CARE: Performed by: INTERNAL MEDICINE

## 2024-01-19 PROCEDURE — 2700000000 HC OXYGEN THERAPY PER DAY

## 2024-01-19 PROCEDURE — 3609012400 HC EGD TRANSORAL BIOPSY SINGLE/MULTIPLE: Performed by: INTERNAL MEDICINE

## 2024-01-19 PROCEDURE — 94640 AIRWAY INHALATION TREATMENT: CPT

## 2024-01-19 PROCEDURE — 6360000002 HC RX W HCPCS: Performed by: HOSPITALIST

## 2024-01-19 PROCEDURE — 82962 GLUCOSE BLOOD TEST: CPT

## 2024-01-19 PROCEDURE — C9113 INJ PANTOPRAZOLE SODIUM, VIA: HCPCS | Performed by: NURSE PRACTITIONER

## 2024-01-19 PROCEDURE — 88342 IMHCHEM/IMCYTCHM 1ST ANTB: CPT

## 2024-01-19 PROCEDURE — 6360000002 HC RX W HCPCS

## 2024-01-19 PROCEDURE — 2580000003 HC RX 258: Performed by: NURSE ANESTHETIST, CERTIFIED REGISTERED

## 2024-01-19 PROCEDURE — 2709999900 HC NON-CHARGEABLE SUPPLY: Performed by: INTERNAL MEDICINE

## 2024-01-19 PROCEDURE — 3700000001 HC ADD 15 MINUTES (ANESTHESIA): Performed by: INTERNAL MEDICINE

## 2024-01-19 PROCEDURE — 7100000010 HC PHASE II RECOVERY - FIRST 15 MIN: Performed by: INTERNAL MEDICINE

## 2024-01-19 PROCEDURE — 7100000011 HC PHASE II RECOVERY - ADDTL 15 MIN: Performed by: INTERNAL MEDICINE

## 2024-01-19 PROCEDURE — 0DB58ZX EXCISION OF ESOPHAGUS, VIA NATURAL OR ARTIFICIAL OPENING ENDOSCOPIC, DIAGNOSTIC: ICD-10-PCS | Performed by: INTERNAL MEDICINE

## 2024-01-19 RX ORDER — PROPOFOL 10 MG/ML
INJECTION, EMULSION INTRAVENOUS PRN
Status: DISCONTINUED | OUTPATIENT
Start: 2024-01-19 | End: 2024-01-19 | Stop reason: SDUPTHER

## 2024-01-19 RX ORDER — SODIUM CHLORIDE 9 MG/ML
INJECTION, SOLUTION INTRAVENOUS CONTINUOUS PRN
Status: DISCONTINUED | OUTPATIENT
Start: 2024-01-19 | End: 2024-01-19 | Stop reason: SDUPTHER

## 2024-01-19 RX ORDER — POTASSIUM CHLORIDE 7.45 MG/ML
10 INJECTION INTRAVENOUS ONCE
Status: COMPLETED | OUTPATIENT
Start: 2024-01-19 | End: 2024-01-19

## 2024-01-19 RX ADMIN — METOCLOPRAMIDE 10 MG: 5 INJECTION, SOLUTION INTRAMUSCULAR; INTRAVENOUS at 17:09

## 2024-01-19 RX ADMIN — BENZTROPINE MESYLATE 2 MG: 1 TABLET ORAL at 21:38

## 2024-01-19 RX ADMIN — METOCLOPRAMIDE 10 MG: 5 INJECTION, SOLUTION INTRAMUSCULAR; INTRAVENOUS at 21:37

## 2024-01-19 RX ADMIN — PIPERACILLIN AND TAZOBACTAM 3375 MG: 3; .375 INJECTION, POWDER, FOR SOLUTION INTRAVENOUS at 06:26

## 2024-01-19 RX ADMIN — SODIUM CHLORIDE SOLN NEBU 3% 4 ML: 3 NEBU SOLN at 17:44

## 2024-01-19 RX ADMIN — DEXTROSE MONOHYDRATE: 50 INJECTION, SOLUTION INTRAVENOUS at 01:52

## 2024-01-19 RX ADMIN — VANCOMYCIN HYDROCHLORIDE 1250 MG: 10 INJECTION, POWDER, LYOPHILIZED, FOR SOLUTION INTRAVENOUS at 04:31

## 2024-01-19 RX ADMIN — PIPERACILLIN AND TAZOBACTAM 3375 MG: 3; .375 INJECTION, POWDER, FOR SOLUTION INTRAVENOUS at 21:46

## 2024-01-19 RX ADMIN — POTASSIUM CHLORIDE 10 MEQ: 7.46 INJECTION, SOLUTION INTRAVENOUS at 10:51

## 2024-01-19 RX ADMIN — WATER 40 MG: 1 INJECTION INTRAMUSCULAR; INTRAVENOUS; SUBCUTANEOUS at 10:36

## 2024-01-19 RX ADMIN — DEXTROSE MONOHYDRATE: 50 INJECTION, SOLUTION INTRAVENOUS at 12:50

## 2024-01-19 RX ADMIN — SODIUM CHLORIDE, PRESERVATIVE FREE 40 MG: 5 INJECTION INTRAVENOUS at 23:32

## 2024-01-19 RX ADMIN — BUDESONIDE INHALATION 500 MCG: 0.5 SUSPENSION RESPIRATORY (INHALATION) at 17:44

## 2024-01-19 RX ADMIN — CLOZAPINE 200 MG: 100 TABLET ORAL at 21:38

## 2024-01-19 RX ADMIN — IPRATROPIUM BROMIDE AND ALBUTEROL SULFATE 1 DOSE: .5; 2.5 SOLUTION RESPIRATORY (INHALATION) at 06:35

## 2024-01-19 RX ADMIN — PIPERACILLIN AND TAZOBACTAM 3375 MG: 3; .375 INJECTION, POWDER, FOR SOLUTION INTRAVENOUS at 17:06

## 2024-01-19 RX ADMIN — METOCLOPRAMIDE 10 MG: 5 INJECTION, SOLUTION INTRAMUSCULAR; INTRAVENOUS at 04:31

## 2024-01-19 RX ADMIN — PROPOFOL 230 MG: 10 INJECTION, EMULSION INTRAVENOUS at 14:24

## 2024-01-19 RX ADMIN — SODIUM CHLORIDE: 900 INJECTION, SOLUTION INTRAVENOUS at 14:17

## 2024-01-19 RX ADMIN — CLONAZEPAM 1.5 MG: 0.5 TABLET, ORALLY DISINTEGRATING ORAL at 21:38

## 2024-01-19 RX ADMIN — IPRATROPIUM BROMIDE AND ALBUTEROL SULFATE 1 DOSE: .5; 2.5 SOLUTION RESPIRATORY (INHALATION) at 10:17

## 2024-01-19 RX ADMIN — Medication 10 ML: at 10:33

## 2024-01-19 RX ADMIN — SODIUM CHLORIDE SOLN NEBU 3% 4 ML: 3 NEBU SOLN at 06:35

## 2024-01-19 RX ADMIN — Medication 10 ML: at 21:38

## 2024-01-19 RX ADMIN — ENOXAPARIN SODIUM 40 MG: 100 INJECTION SUBCUTANEOUS at 10:34

## 2024-01-19 RX ADMIN — SODIUM CHLORIDE, PRESERVATIVE FREE 40 MG: 5 INJECTION INTRAVENOUS at 12:47

## 2024-01-19 RX ADMIN — METOCLOPRAMIDE 10 MG: 5 INJECTION, SOLUTION INTRAMUSCULAR; INTRAVENOUS at 10:39

## 2024-01-19 RX ADMIN — IPRATROPIUM BROMIDE AND ALBUTEROL SULFATE 1 DOSE: .5; 2.5 SOLUTION RESPIRATORY (INHALATION) at 17:44

## 2024-01-19 ASSESSMENT — PAIN SCALES - PAIN ASSESSMENT IN ADVANCED DEMENTIA (PAINAD)
FACIALEXPRESSION: 0
BODYLANGUAGE: 0
CONSOLABILITY: 0
NEGVOCALIZATION: 0
FACIALEXPRESSION: 0
CONSOLABILITY: 0
BODYLANGUAGE: 0
BREATHING: 0
TOTALSCORE: 0
NEGVOCALIZATION: 0
BREATHING: 0
TOTALSCORE: 0

## 2024-01-19 ASSESSMENT — LIFESTYLE VARIABLES: SMOKING_STATUS: 1

## 2024-01-19 ASSESSMENT — PAIN SCALES - GENERAL: PAINLEVEL_OUTOF10: 0

## 2024-01-19 NOTE — OP NOTE
[address] [address]                                OPERATIVE REPORT    PATIENT NAME: KAREN MENDOSA                       :        1955  MED REC NO:   [id number]                            ROOM:  ACCOUNT NO:   [id number]                           ADMIT DATE: 2024  PROVIDER:     Kelvin Baltazar MD    DATE OF PROCEDURE:  2024    PREOPERATIVE DIAGNOSIS:  Vomiting.    POSTOPERATIVE DIAGNOSES:  Esophagitis, polyp GE junction, acute gastric  ulcers.    OPERATION:  EGD.    SURGEON:  Kelvin Baltazar MD    INDICATION:  The patient is a 68-year-old brought with vomiting.  ASA  classification IV.  Informed consent.    DESCRIPTION OF PROCEDURE:  The Olympus video upper endoscope was  introduced through the mouth.  The esophagus, stomach and proximal  duodenum were inspected.  Exam of the gastric fundus by retroflexion.   The stomach distended well with air insufflation.  Severe esophagitis  with columnar epithelium and ulcer, biopsies.  Polyp at the  gastroesophageal junction, biopsies.  Acute ulcers in the gastric  fundus, probably due to the nasogastric suction.  Exam of the upper  esophagus, hypopharynx during the withdrawal.  Estimated blood loss  none.        KELVIN BALTAZAR MD    D: 2024 14:36:33       T: 2024 14:40:15     [email or web address]  Job#: [id number]     Doc#: [id number]    CC:

## 2024-01-19 NOTE — ANESTHESIA PRE PROCEDURE
Passive exposure: Never    Smokeless tobacco: Never   Substance Use Topics    Alcohol use: Defer                                Ready to quit: No  Counseling given: Not Answered      Vital Signs (Current):   Vitals:    01/18/24 2223 01/19/24 0400 01/19/24 0538 01/19/24 0815   BP:  130/66  107/61   Pulse: 83 81  78   Resp:  18  20   Temp:  36.3 °C (97.3 °F)  36.6 °C (97.8 °F)   TempSrc:  Axillary  Axillary   SpO2:  97%  98%   Weight:   70.1 kg (154 lb 9.6 oz)    Height:                                                  BP Readings from Last 3 Encounters:   01/19/24 107/61       NPO Status:                                                                                 BMI:   Wt Readings from Last 3 Encounters:   01/19/24 70.1 kg (154 lb 9.6 oz)     Body mass index is 26.52 kg/m².    CBC:   Lab Results   Component Value Date/Time    WBC 12.8 01/19/2024 07:28 AM    RBC 4.68 01/19/2024 07:28 AM    HGB 14.3 01/19/2024 07:28 AM    HCT 43.5 01/19/2024 07:28 AM    MCV 92.9 01/19/2024 07:28 AM    RDW 13.1 01/19/2024 07:28 AM     01/19/2024 07:28 AM       CMP:   Lab Results   Component Value Date/Time     01/19/2024 07:28 AM    K 3.3 01/19/2024 07:28 AM    CL 89 01/19/2024 07:28 AM    CO2 42 01/19/2024 07:28 AM    BUN 19 01/19/2024 07:28 AM    CREATININE 1.3 01/19/2024 07:28 AM    LABGLOM 58 01/19/2024 07:28 AM    GLUCOSE 135 01/19/2024 07:28 AM    PROT 5.6 01/19/2024 07:28 AM    CALCIUM 8.4 01/19/2024 07:28 AM    BILITOT 0.8 01/19/2024 07:28 AM    ALKPHOS 120 01/19/2024 07:28 AM     01/19/2024 07:28 AM     01/19/2024 07:28 AM       POC Tests:   Recent Labs     01/19/24  0540   POCGLU 136*       Coags:   Lab Results   Component Value Date/Time    PROTIME 13.6 01/19/2024 07:28 AM    INR 1.2 01/19/2024 07:28 AM       HCG (If Applicable): No results found for: \"PREGTESTUR\", \"PREGSERUM\", \"HCG\", \"HCGQUANT\"     ABGs: No results found for: \"PHART\", \"PO2ART\", \"XXB9UNA\", \"DMS2CDI\", \"BEART\", \"Q6PIVSTF\"

## 2024-01-19 NOTE — CARE COORDINATION
SOCIAL WORK / DISCHARGE PLANNING:  Pt to have EGD today. He remains on 8L HF with ng tube in place. Output remains significant. Sitter has been at bedside in insure ng tube will not get pulled out. Pt's mother is legal guardian, she speaks English but primary language is Syriac. His brother Tobi will often call in for info. He assists parents who are elderly who provide all care for pt. Pt was ambulatory prior to admission. [facility] but [facility], no longer cover LTAC. Pt does have Medicare Part A & B as well. Dc planning will be ongoing dependent on care needs.                 Electronically signed by KVNG Castano on 1/19/2024 at 12:16 PM

## 2024-01-19 NOTE — ANESTHESIA POSTPROCEDURE EVALUATION
Department of Anesthesiology  Postprocedure Note    Patient: Aureliano Falcon  MRN: [id number]  YOB: 1955  Date of evaluation: 1/19/2024    Procedure Summary     Date: 01/19/24 Room / Location: [place] [address] / [facility]    Anesthesia Start: 1411 Anesthesia Stop: 1444    Procedure: EGD BIOPSY Diagnosis:       Nausea and vomiting, unspecified vomiting type      (Nausea and vomiting, unspecified vomiting type [R11.2])    Surgeons: Temo Barnett MD Responsible Provider: Braden Cuevas MD    Anesthesia Type: MAC ASA Status: 3          Anesthesia Type: No value filed.    Gilles Phase I:      Gilles Phase II: Gilles Score: 8    Anesthesia Post Evaluation    Patient location during evaluation: PACU  Patient participation: complete - patient participated  Level of consciousness: awake and alert  Airway patency: patent  Nausea & Vomiting: no nausea and no vomiting  Cardiovascular status: hemodynamically stable  Respiratory status: acceptable  Hydration status: euvolemic  Pain management: satisfactory to patient        No notable events documented.

## 2024-01-20 LAB
ALBUMIN SERPL-MCNC: 2.6 G/DL (ref 3.5–5.2)
ALP SERPL-CCNC: 121 U/L (ref 40–129)
ALT SERPL-CCNC: 210 U/L (ref 0–40)
ANION GAP SERPL CALCULATED.3IONS-SCNC: 9 MMOL/L (ref 7–16)
AST SERPL-CCNC: 97 U/L (ref 0–39)
BILIRUB SERPL-MCNC: 0.8 MG/DL (ref 0–1.2)
BUN SERPL-MCNC: 17 MG/DL (ref 6–23)
CALCIUM SERPL-MCNC: 8.3 MG/DL (ref 8.6–10.2)
CHLORIDE SERPL-SCNC: 91 MMOL/L (ref 98–107)
CO2 SERPL-SCNC: 35 MMOL/L (ref 22–29)
CREAT SERPL-MCNC: 1.5 MG/DL (ref 0.7–1.2)
ERYTHROCYTE [DISTWIDTH] IN BLOOD BY AUTOMATED COUNT: 12.9 % (ref 11.5–15)
GFR SERPL CREATININE-BSD FRML MDRD: 50 ML/MIN/1.73M2
GLUCOSE BLD-MCNC: 130 MG/DL (ref 74–99)
GLUCOSE BLD-MCNC: 142 MG/DL (ref 74–99)
GLUCOSE BLD-MCNC: 165 MG/DL (ref 74–99)
GLUCOSE BLD-MCNC: 194 MG/DL (ref 74–99)
GLUCOSE SERPL-MCNC: 141 MG/DL (ref 74–99)
HCT VFR BLD AUTO: 40.9 % (ref 37–54)
HGB BLD-MCNC: 13.5 G/DL (ref 12.5–16.5)
MAGNESIUM SERPL-MCNC: 2.4 MG/DL (ref 1.6–2.6)
MCH RBC QN AUTO: 30.6 PG (ref 26–35)
MCHC RBC AUTO-ENTMCNC: 33 G/DL (ref 32–34.5)
MCV RBC AUTO: 92.7 FL (ref 80–99.9)
PHOSPHATE SERPL-MCNC: 2.5 MG/DL (ref 2.5–4.5)
PLATELET # BLD AUTO: 381 K/UL (ref 130–450)
PMV BLD AUTO: 10.8 FL (ref 7–12)
POTASSIUM SERPL-SCNC: 2.9 MMOL/L (ref 3.5–5)
PROT SERPL-MCNC: 5.2 G/DL (ref 6.4–8.3)
RBC # BLD AUTO: 4.41 M/UL (ref 3.8–5.8)
SODIUM SERPL-SCNC: 135 MMOL/L (ref 132–146)
WBC OTHER # BLD: 17.2 K/UL (ref 4.5–11.5)

## 2024-01-20 PROCEDURE — 2700000000 HC OXYGEN THERAPY PER DAY

## 2024-01-20 PROCEDURE — 2580000003 HC RX 258: Performed by: INTERNAL MEDICINE

## 2024-01-20 PROCEDURE — 92526 ORAL FUNCTION THERAPY: CPT

## 2024-01-20 PROCEDURE — 82962 GLUCOSE BLOOD TEST: CPT

## 2024-01-20 PROCEDURE — 2580000003 HC RX 258: Performed by: NURSE PRACTITIONER

## 2024-01-20 PROCEDURE — 83735 ASSAY OF MAGNESIUM: CPT

## 2024-01-20 PROCEDURE — 6370000000 HC RX 637 (ALT 250 FOR IP): Performed by: INTERNAL MEDICINE

## 2024-01-20 PROCEDURE — 6360000002 HC RX W HCPCS: Performed by: NURSE PRACTITIONER

## 2024-01-20 PROCEDURE — 92610 EVALUATE SWALLOWING FUNCTION: CPT

## 2024-01-20 PROCEDURE — 2060000000 HC ICU INTERMEDIATE R&B

## 2024-01-20 PROCEDURE — 6360000002 HC RX W HCPCS: Performed by: INTERNAL MEDICINE

## 2024-01-20 PROCEDURE — 2500000003 HC RX 250 WO HCPCS: Performed by: INTERNAL MEDICINE

## 2024-01-20 PROCEDURE — A4216 STERILE WATER/SALINE, 10 ML: HCPCS | Performed by: NURSE PRACTITIONER

## 2024-01-20 PROCEDURE — 99232 SBSQ HOSP IP/OBS MODERATE 35: CPT | Performed by: INTERNAL MEDICINE

## 2024-01-20 PROCEDURE — 6360000002 HC RX W HCPCS

## 2024-01-20 PROCEDURE — 84100 ASSAY OF PHOSPHORUS: CPT

## 2024-01-20 PROCEDURE — 97530 THERAPEUTIC ACTIVITIES: CPT

## 2024-01-20 PROCEDURE — 2580000003 HC RX 258

## 2024-01-20 PROCEDURE — 80053 COMPREHEN METABOLIC PANEL: CPT

## 2024-01-20 PROCEDURE — C9113 INJ PANTOPRAZOLE SODIUM, VIA: HCPCS | Performed by: NURSE PRACTITIONER

## 2024-01-20 PROCEDURE — 36415 COLL VENOUS BLD VENIPUNCTURE: CPT

## 2024-01-20 PROCEDURE — 97110 THERAPEUTIC EXERCISES: CPT

## 2024-01-20 PROCEDURE — 2580000003 HC RX 258: Performed by: STUDENT IN AN ORGANIZED HEALTH CARE EDUCATION/TRAINING PROGRAM

## 2024-01-20 PROCEDURE — 6360000002 HC RX W HCPCS: Performed by: HOSPITALIST

## 2024-01-20 PROCEDURE — 94640 AIRWAY INHALATION TREATMENT: CPT

## 2024-01-20 PROCEDURE — 85027 COMPLETE CBC AUTOMATED: CPT

## 2024-01-20 RX ORDER — POTASSIUM CHLORIDE 7.45 MG/ML
10 INJECTION INTRAVENOUS
Status: COMPLETED | OUTPATIENT
Start: 2024-01-20 | End: 2024-01-20

## 2024-01-20 RX ADMIN — CLONAZEPAM 0.25 MG: 0.5 TABLET, ORALLY DISINTEGRATING ORAL at 15:09

## 2024-01-20 RX ADMIN — METOCLOPRAMIDE 10 MG: 5 INJECTION, SOLUTION INTRAMUSCULAR; INTRAVENOUS at 05:03

## 2024-01-20 RX ADMIN — METOCLOPRAMIDE 10 MG: 5 INJECTION, SOLUTION INTRAMUSCULAR; INTRAVENOUS at 22:51

## 2024-01-20 RX ADMIN — Medication 8 ML: at 23:14

## 2024-01-20 RX ADMIN — BENZTROPINE MESYLATE 2 MG: 1 TABLET ORAL at 10:26

## 2024-01-20 RX ADMIN — METOCLOPRAMIDE 10 MG: 5 INJECTION, SOLUTION INTRAMUSCULAR; INTRAVENOUS at 11:48

## 2024-01-20 RX ADMIN — METOCLOPRAMIDE 10 MG: 5 INJECTION, SOLUTION INTRAMUSCULAR; INTRAVENOUS at 17:09

## 2024-01-20 RX ADMIN — BENZTROPINE MESYLATE 2 MG: 1 TABLET ORAL at 22:51

## 2024-01-20 RX ADMIN — SODIUM CHLORIDE, PRESERVATIVE FREE 40 MG: 5 INJECTION INTRAVENOUS at 11:48

## 2024-01-20 RX ADMIN — SODIUM CHLORIDE SOLN NEBU 3% 4 ML: 3 NEBU SOLN at 04:41

## 2024-01-20 RX ADMIN — PIPERACILLIN AND TAZOBACTAM 3375 MG: 3; .375 INJECTION, POWDER, FOR SOLUTION INTRAVENOUS at 14:00

## 2024-01-20 RX ADMIN — CLONAZEPAM 0.5 MG: 0.5 TABLET, ORALLY DISINTEGRATING ORAL at 10:25

## 2024-01-20 RX ADMIN — PIPERACILLIN AND TAZOBACTAM 3375 MG: 3; .375 INJECTION, POWDER, FOR SOLUTION INTRAVENOUS at 22:42

## 2024-01-20 RX ADMIN — POTASSIUM CHLORIDE 10 MEQ: 7.46 INJECTION, SOLUTION INTRAVENOUS at 14:03

## 2024-01-20 RX ADMIN — PIPERACILLIN AND TAZOBACTAM 3375 MG: 3; .375 INJECTION, POWDER, FOR SOLUTION INTRAVENOUS at 05:04

## 2024-01-20 RX ADMIN — CLOZAPINE 100 MG: 100 TABLET ORAL at 10:27

## 2024-01-20 RX ADMIN — SODIUM ACETATE: 164 INJECTION, SOLUTION, CONCENTRATE INTRAVENOUS at 18:31

## 2024-01-20 RX ADMIN — BUDESONIDE INHALATION 500 MCG: 0.5 SUSPENSION RESPIRATORY (INHALATION) at 18:06

## 2024-01-20 RX ADMIN — POTASSIUM CHLORIDE 10 MEQ: 7.46 INJECTION, SOLUTION INTRAVENOUS at 13:00

## 2024-01-20 RX ADMIN — WATER 40 MG: 1 INJECTION INTRAMUSCULAR; INTRAVENOUS; SUBCUTANEOUS at 10:27

## 2024-01-20 RX ADMIN — POTASSIUM CHLORIDE 10 MEQ: 7.46 INJECTION, SOLUTION INTRAVENOUS at 15:06

## 2024-01-20 RX ADMIN — IPRATROPIUM BROMIDE AND ALBUTEROL SULFATE 1 DOSE: .5; 2.5 SOLUTION RESPIRATORY (INHALATION) at 04:41

## 2024-01-20 RX ADMIN — IPRATROPIUM BROMIDE AND ALBUTEROL SULFATE 1 DOSE: .5; 2.5 SOLUTION RESPIRATORY (INHALATION) at 15:04

## 2024-01-20 RX ADMIN — SODIUM CHLORIDE, PRESERVATIVE FREE 40 MG: 5 INJECTION INTRAVENOUS at 22:44

## 2024-01-20 RX ADMIN — POTASSIUM CHLORIDE 10 MEQ: 7.46 INJECTION, SOLUTION INTRAVENOUS at 16:00

## 2024-01-20 RX ADMIN — DEXTROSE MONOHYDRATE: 50 INJECTION, SOLUTION INTRAVENOUS at 01:32

## 2024-01-20 RX ADMIN — CLOZAPINE 200 MG: 100 TABLET ORAL at 22:51

## 2024-01-20 RX ADMIN — CLONAZEPAM 1.5 MG: 0.5 TABLET, ORALLY DISINTEGRATING ORAL at 22:51

## 2024-01-20 RX ADMIN — IPRATROPIUM BROMIDE AND ALBUTEROL SULFATE 1 DOSE: .5; 2.5 SOLUTION RESPIRATORY (INHALATION) at 09:51

## 2024-01-20 RX ADMIN — BUDESONIDE INHALATION 500 MCG: 0.5 SUSPENSION RESPIRATORY (INHALATION) at 04:41

## 2024-01-20 RX ADMIN — ENOXAPARIN SODIUM 40 MG: 100 INJECTION SUBCUTANEOUS at 10:25

## 2024-01-20 RX ADMIN — IPRATROPIUM BROMIDE AND ALBUTEROL SULFATE 1 DOSE: .5; 2.5 SOLUTION RESPIRATORY (INHALATION) at 18:06

## 2024-01-20 RX ADMIN — DEXTROSE MONOHYDRATE: 50 INJECTION, SOLUTION INTRAVENOUS at 11:46

## 2024-01-20 RX ADMIN — SODIUM CHLORIDE SOLN NEBU 3% 4 ML: 3 NEBU SOLN at 18:07

## 2024-01-21 LAB
ALBUMIN SERPL-MCNC: 2.7 G/DL (ref 3.5–5.2)
ALP SERPL-CCNC: 107 U/L (ref 40–129)
ALT SERPL-CCNC: 200 U/L (ref 0–40)
ANION GAP SERPL CALCULATED.3IONS-SCNC: 9 MMOL/L (ref 7–16)
AST SERPL-CCNC: 74 U/L (ref 0–39)
BILIRUB SERPL-MCNC: 0.5 MG/DL (ref 0–1.2)
BUN SERPL-MCNC: 19 MG/DL (ref 6–23)
CALCIUM SERPL-MCNC: 8.5 MG/DL (ref 8.6–10.2)
CHLORIDE SERPL-SCNC: 99 MMOL/L (ref 98–107)
CO2 SERPL-SCNC: 34 MMOL/L (ref 22–29)
CREAT SERPL-MCNC: 1.4 MG/DL (ref 0.7–1.2)
ERYTHROCYTE [DISTWIDTH] IN BLOOD BY AUTOMATED COUNT: 12.8 % (ref 11.5–15)
GFR SERPL CREATININE-BSD FRML MDRD: 55 ML/MIN/1.73M2
GLUCOSE BLD-MCNC: 105 MG/DL (ref 74–99)
GLUCOSE BLD-MCNC: 166 MG/DL (ref 74–99)
GLUCOSE SERPL-MCNC: 142 MG/DL (ref 74–99)
HCT VFR BLD AUTO: 34.8 % (ref 37–54)
HGB BLD-MCNC: 11.7 G/DL (ref 12.5–16.5)
MAGNESIUM SERPL-MCNC: 2.5 MG/DL (ref 1.6–2.6)
MCH RBC QN AUTO: 31 PG (ref 26–35)
MCHC RBC AUTO-ENTMCNC: 33.6 G/DL (ref 32–34.5)
MCV RBC AUTO: 92.1 FL (ref 80–99.9)
PHOSPHATE SERPL-MCNC: 2 MG/DL (ref 2.5–4.5)
PLATELET # BLD AUTO: 401 K/UL (ref 130–450)
PMV BLD AUTO: 10.9 FL (ref 7–12)
POTASSIUM SERPL-SCNC: 3.6 MMOL/L (ref 3.5–5)
PROT SERPL-MCNC: 5 G/DL (ref 6.4–8.3)
RBC # BLD AUTO: 3.78 M/UL (ref 3.8–5.8)
SODIUM SERPL-SCNC: 142 MMOL/L (ref 132–146)
WBC OTHER # BLD: 13 K/UL (ref 4.5–11.5)

## 2024-01-21 PROCEDURE — 6370000000 HC RX 637 (ALT 250 FOR IP): Performed by: INTERNAL MEDICINE

## 2024-01-21 PROCEDURE — 99233 SBSQ HOSP IP/OBS HIGH 50: CPT | Performed by: INTERNAL MEDICINE

## 2024-01-21 PROCEDURE — C9113 INJ PANTOPRAZOLE SODIUM, VIA: HCPCS | Performed by: NURSE PRACTITIONER

## 2024-01-21 PROCEDURE — 6360000002 HC RX W HCPCS: Performed by: INTERNAL MEDICINE

## 2024-01-21 PROCEDURE — 94640 AIRWAY INHALATION TREATMENT: CPT

## 2024-01-21 PROCEDURE — 82962 GLUCOSE BLOOD TEST: CPT

## 2024-01-21 PROCEDURE — 36415 COLL VENOUS BLD VENIPUNCTURE: CPT

## 2024-01-21 PROCEDURE — 6360000002 HC RX W HCPCS: Performed by: NURSE PRACTITIONER

## 2024-01-21 PROCEDURE — 83735 ASSAY OF MAGNESIUM: CPT

## 2024-01-21 PROCEDURE — 80053 COMPREHEN METABOLIC PANEL: CPT

## 2024-01-21 PROCEDURE — 2060000000 HC ICU INTERMEDIATE R&B

## 2024-01-21 PROCEDURE — 6360000002 HC RX W HCPCS: Performed by: HOSPITALIST

## 2024-01-21 PROCEDURE — 2580000003 HC RX 258

## 2024-01-21 PROCEDURE — 2580000003 HC RX 258: Performed by: STUDENT IN AN ORGANIZED HEALTH CARE EDUCATION/TRAINING PROGRAM

## 2024-01-21 PROCEDURE — 85027 COMPLETE CBC AUTOMATED: CPT

## 2024-01-21 PROCEDURE — 2700000000 HC OXYGEN THERAPY PER DAY

## 2024-01-21 PROCEDURE — 2580000003 HC RX 258: Performed by: INTERNAL MEDICINE

## 2024-01-21 PROCEDURE — 6360000002 HC RX W HCPCS

## 2024-01-21 PROCEDURE — 94669 MECHANICAL CHEST WALL OSCILL: CPT

## 2024-01-21 PROCEDURE — 2580000003 HC RX 258: Performed by: NURSE PRACTITIONER

## 2024-01-21 PROCEDURE — A4216 STERILE WATER/SALINE, 10 ML: HCPCS | Performed by: NURSE PRACTITIONER

## 2024-01-21 PROCEDURE — 84100 ASSAY OF PHOSPHORUS: CPT

## 2024-01-21 RX ORDER — LIDOCAINE HYDROCHLORIDE 20 MG/ML
JELLY TOPICAL ONCE
Status: DISCONTINUED | OUTPATIENT
Start: 2024-01-21 | End: 2024-01-31 | Stop reason: HOSPADM

## 2024-01-21 RX ORDER — LIDOCAINE HYDROCHLORIDE 20 MG/ML
JELLY TOPICAL ONCE
Status: DISCONTINUED | OUTPATIENT
Start: 2024-01-21 | End: 2024-01-21 | Stop reason: CLARIF

## 2024-01-21 RX ORDER — OXYMETAZOLINE HYDROCHLORIDE 0.05 G/100ML
2 SPRAY NASAL ONCE
Status: DISPENSED | OUTPATIENT
Start: 2024-01-21 | End: 2024-01-24

## 2024-01-21 RX ADMIN — CLONAZEPAM 1.5 MG: 0.5 TABLET, ORALLY DISINTEGRATING ORAL at 21:12

## 2024-01-21 RX ADMIN — METOCLOPRAMIDE 10 MG: 5 INJECTION, SOLUTION INTRAMUSCULAR; INTRAVENOUS at 04:26

## 2024-01-21 RX ADMIN — IPRATROPIUM BROMIDE AND ALBUTEROL SULFATE 1 DOSE: .5; 2.5 SOLUTION RESPIRATORY (INHALATION) at 18:32

## 2024-01-21 RX ADMIN — BUDESONIDE INHALATION 500 MCG: 0.5 SUSPENSION RESPIRATORY (INHALATION) at 06:02

## 2024-01-21 RX ADMIN — IPRATROPIUM BROMIDE AND ALBUTEROL SULFATE 1 DOSE: .5; 2.5 SOLUTION RESPIRATORY (INHALATION) at 06:02

## 2024-01-21 RX ADMIN — PIPERACILLIN AND TAZOBACTAM 3375 MG: 3; .375 INJECTION, POWDER, FOR SOLUTION INTRAVENOUS at 04:34

## 2024-01-21 RX ADMIN — WATER 40 MG: 1 INJECTION INTRAMUSCULAR; INTRAVENOUS; SUBCUTANEOUS at 08:32

## 2024-01-21 RX ADMIN — PIPERACILLIN AND TAZOBACTAM 3375 MG: 3; .375 INJECTION, POWDER, FOR SOLUTION INTRAVENOUS at 16:13

## 2024-01-21 RX ADMIN — METOCLOPRAMIDE 10 MG: 5 INJECTION, SOLUTION INTRAMUSCULAR; INTRAVENOUS at 21:13

## 2024-01-21 RX ADMIN — METOCLOPRAMIDE 10 MG: 5 INJECTION, SOLUTION INTRAMUSCULAR; INTRAVENOUS at 16:11

## 2024-01-21 RX ADMIN — SODIUM CHLORIDE SOLN NEBU 3% 4 ML: 3 NEBU SOLN at 18:32

## 2024-01-21 RX ADMIN — ENOXAPARIN SODIUM 40 MG: 100 INJECTION SUBCUTANEOUS at 08:32

## 2024-01-21 RX ADMIN — BENZTROPINE MESYLATE 2 MG: 1 TABLET ORAL at 21:12

## 2024-01-21 RX ADMIN — SODIUM CHLORIDE, PRESERVATIVE FREE 40 MG: 5 INJECTION INTRAVENOUS at 23:03

## 2024-01-21 RX ADMIN — CLOZAPINE 200 MG: 100 TABLET ORAL at 21:13

## 2024-01-21 RX ADMIN — Medication 10 ML: at 08:32

## 2024-01-21 RX ADMIN — IPRATROPIUM BROMIDE AND ALBUTEROL SULFATE 1 DOSE: .5; 2.5 SOLUTION RESPIRATORY (INHALATION) at 12:51

## 2024-01-21 RX ADMIN — CLONAZEPAM 0.5 MG: 0.5 TABLET, ORALLY DISINTEGRATING ORAL at 08:33

## 2024-01-21 RX ADMIN — PIPERACILLIN AND TAZOBACTAM 3375 MG: 3; .375 INJECTION, POWDER, FOR SOLUTION INTRAVENOUS at 23:04

## 2024-01-21 RX ADMIN — CLOZAPINE 100 MG: 100 TABLET ORAL at 08:32

## 2024-01-21 RX ADMIN — Medication 5 ML: at 22:25

## 2024-01-21 RX ADMIN — CLONAZEPAM 0.25 MG: 0.5 TABLET, ORALLY DISINTEGRATING ORAL at 16:12

## 2024-01-21 RX ADMIN — METOCLOPRAMIDE 10 MG: 5 INJECTION, SOLUTION INTRAMUSCULAR; INTRAVENOUS at 11:00

## 2024-01-21 RX ADMIN — BUDESONIDE INHALATION 500 MCG: 0.5 SUSPENSION RESPIRATORY (INHALATION) at 18:32

## 2024-01-21 RX ADMIN — SODIUM CHLORIDE, PRESERVATIVE FREE 40 MG: 5 INJECTION INTRAVENOUS at 11:00

## 2024-01-21 RX ADMIN — SODIUM CHLORIDE SOLN NEBU 3% 4 ML: 3 NEBU SOLN at 06:02

## 2024-01-21 RX ADMIN — BENZTROPINE MESYLATE 2 MG: 1 TABLET ORAL at 08:33

## 2024-01-21 RX ADMIN — IPRATROPIUM BROMIDE AND ALBUTEROL SULFATE 1 DOSE: .5; 2.5 SOLUTION RESPIRATORY (INHALATION) at 09:12

## 2024-01-22 ENCOUNTER — APPOINTMENT (OUTPATIENT)
Dept: GENERAL RADIOLOGY | Age: 69
DRG: 871 | End: 2024-01-22
Payer: OTHER GOVERNMENT

## 2024-01-22 LAB
ALBUMIN SERPL-MCNC: 2.7 G/DL (ref 3.5–5.2)
ALP SERPL-CCNC: 118 U/L (ref 40–129)
ALT SERPL-CCNC: 223 U/L (ref 0–40)
ANION GAP SERPL CALCULATED.3IONS-SCNC: 5 MMOL/L (ref 7–16)
AST SERPL-CCNC: 89 U/L (ref 0–39)
BILIRUB SERPL-MCNC: 0.5 MG/DL (ref 0–1.2)
BUN SERPL-MCNC: 24 MG/DL (ref 6–23)
CALCIUM SERPL-MCNC: 8.7 MG/DL (ref 8.6–10.2)
CHLORIDE SERPL-SCNC: 100 MMOL/L (ref 98–107)
CO2 SERPL-SCNC: 33 MMOL/L (ref 22–29)
CREAT SERPL-MCNC: 1.4 MG/DL (ref 0.7–1.2)
ERYTHROCYTE [DISTWIDTH] IN BLOOD BY AUTOMATED COUNT: 13.3 % (ref 11.5–15)
GFR SERPL CREATININE-BSD FRML MDRD: 56 ML/MIN/1.73M2
GLUCOSE BLD-MCNC: 137 MG/DL (ref 74–99)
GLUCOSE BLD-MCNC: 138 MG/DL (ref 74–99)
GLUCOSE BLD-MCNC: 144 MG/DL (ref 74–99)
GLUCOSE SERPL-MCNC: 100 MG/DL (ref 74–99)
HCT VFR BLD AUTO: 38.7 % (ref 37–54)
HGB BLD-MCNC: 12.7 G/DL (ref 12.5–16.5)
MAGNESIUM SERPL-MCNC: 2.1 MG/DL (ref 1.6–2.6)
MCH RBC QN AUTO: 31.1 PG (ref 26–35)
MCHC RBC AUTO-ENTMCNC: 32.8 G/DL (ref 32–34.5)
MCV RBC AUTO: 94.9 FL (ref 80–99.9)
PHOSPHATE SERPL-MCNC: 2.1 MG/DL (ref 2.5–4.5)
PLATELET # BLD AUTO: 406 K/UL (ref 130–450)
PMV BLD AUTO: 10.5 FL (ref 7–12)
POTASSIUM SERPL-SCNC: 3.5 MMOL/L (ref 3.5–5)
PROT SERPL-MCNC: 5.4 G/DL (ref 6.4–8.3)
RBC # BLD AUTO: 4.08 M/UL (ref 3.8–5.8)
SODIUM SERPL-SCNC: 138 MMOL/L (ref 132–146)
WBC OTHER # BLD: 16.5 K/UL (ref 4.5–11.5)

## 2024-01-22 PROCEDURE — 85027 COMPLETE CBC AUTOMATED: CPT

## 2024-01-22 PROCEDURE — 6360000002 HC RX W HCPCS: Performed by: HOSPITALIST

## 2024-01-22 PROCEDURE — 6370000000 HC RX 637 (ALT 250 FOR IP): Performed by: INTERNAL MEDICINE

## 2024-01-22 PROCEDURE — A4216 STERILE WATER/SALINE, 10 ML: HCPCS | Performed by: NURSE PRACTITIONER

## 2024-01-22 PROCEDURE — 94640 AIRWAY INHALATION TREATMENT: CPT

## 2024-01-22 PROCEDURE — 80053 COMPREHEN METABOLIC PANEL: CPT

## 2024-01-22 PROCEDURE — 2500000003 HC RX 250 WO HCPCS: Performed by: NURSE PRACTITIONER

## 2024-01-22 PROCEDURE — 99233 SBSQ HOSP IP/OBS HIGH 50: CPT | Performed by: INTERNAL MEDICINE

## 2024-01-22 PROCEDURE — 6360000002 HC RX W HCPCS: Performed by: INTERNAL MEDICINE

## 2024-01-22 PROCEDURE — 83735 ASSAY OF MAGNESIUM: CPT

## 2024-01-22 PROCEDURE — 36415 COLL VENOUS BLD VENIPUNCTURE: CPT

## 2024-01-22 PROCEDURE — 2700000000 HC OXYGEN THERAPY PER DAY

## 2024-01-22 PROCEDURE — 2580000003 HC RX 258: Performed by: NURSE PRACTITIONER

## 2024-01-22 PROCEDURE — 2500000003 HC RX 250 WO HCPCS: Performed by: INTERNAL MEDICINE

## 2024-01-22 PROCEDURE — 74230 X-RAY XM SWLNG FUNCJ C+: CPT

## 2024-01-22 PROCEDURE — 6360000002 HC RX W HCPCS

## 2024-01-22 PROCEDURE — C9113 INJ PANTOPRAZOLE SODIUM, VIA: HCPCS | Performed by: NURSE PRACTITIONER

## 2024-01-22 PROCEDURE — 84100 ASSAY OF PHOSPHORUS: CPT

## 2024-01-22 PROCEDURE — 2580000003 HC RX 258

## 2024-01-22 PROCEDURE — 6360000002 HC RX W HCPCS: Performed by: NURSE PRACTITIONER

## 2024-01-22 PROCEDURE — 82962 GLUCOSE BLOOD TEST: CPT

## 2024-01-22 PROCEDURE — 92611 MOTION FLUOROSCOPY/SWALLOW: CPT | Performed by: SPEECH-LANGUAGE PATHOLOGIST

## 2024-01-22 PROCEDURE — 2060000000 HC ICU INTERMEDIATE R&B

## 2024-01-22 PROCEDURE — 2580000003 HC RX 258: Performed by: INTERNAL MEDICINE

## 2024-01-22 RX ADMIN — PIPERACILLIN AND TAZOBACTAM 3375 MG: 3; .375 INJECTION, POWDER, FOR SOLUTION INTRAVENOUS at 14:16

## 2024-01-22 RX ADMIN — PIPERACILLIN AND TAZOBACTAM 3375 MG: 3; .375 INJECTION, POWDER, FOR SOLUTION INTRAVENOUS at 07:02

## 2024-01-22 RX ADMIN — CLOZAPINE 100 MG: 100 TABLET ORAL at 09:27

## 2024-01-22 RX ADMIN — METOCLOPRAMIDE 10 MG: 5 INJECTION, SOLUTION INTRAMUSCULAR; INTRAVENOUS at 21:00

## 2024-01-22 RX ADMIN — POTASSIUM PHOSPHATE, MONOBASIC POTASSIUM PHOSPHATE, DIBASIC 15 MMOL: 224; 236 INJECTION, SOLUTION, CONCENTRATE INTRAVENOUS at 14:19

## 2024-01-22 RX ADMIN — METOCLOPRAMIDE 10 MG: 5 INJECTION, SOLUTION INTRAMUSCULAR; INTRAVENOUS at 16:39

## 2024-01-22 RX ADMIN — BENZTROPINE MESYLATE 2 MG: 1 TABLET ORAL at 21:00

## 2024-01-22 RX ADMIN — CLONAZEPAM 0.25 MG: 0.5 TABLET, ORALLY DISINTEGRATING ORAL at 14:19

## 2024-01-22 RX ADMIN — BARIUM SULFATE 45 G: 0.81 POWDER, FOR SUSPENSION ORAL at 10:56

## 2024-01-22 RX ADMIN — BARIUM SULFATE 45 ML: 400 SUSPENSION ORAL at 10:57

## 2024-01-22 RX ADMIN — METOCLOPRAMIDE 10 MG: 5 INJECTION, SOLUTION INTRAMUSCULAR; INTRAVENOUS at 07:00

## 2024-01-22 RX ADMIN — IPRATROPIUM BROMIDE AND ALBUTEROL SULFATE 1 DOSE: .5; 2.5 SOLUTION RESPIRATORY (INHALATION) at 09:39

## 2024-01-22 RX ADMIN — BENZTROPINE MESYLATE 2 MG: 1 TABLET ORAL at 09:27

## 2024-01-22 RX ADMIN — ENOXAPARIN SODIUM 40 MG: 100 INJECTION SUBCUTANEOUS at 09:24

## 2024-01-22 RX ADMIN — BUDESONIDE INHALATION 500 MCG: 0.5 SUSPENSION RESPIRATORY (INHALATION) at 19:15

## 2024-01-22 RX ADMIN — SODIUM CHLORIDE, PRESERVATIVE FREE 40 MG: 5 INJECTION INTRAVENOUS at 23:51

## 2024-01-22 RX ADMIN — WATER 40 MG: 1 INJECTION INTRAMUSCULAR; INTRAVENOUS; SUBCUTANEOUS at 09:24

## 2024-01-22 RX ADMIN — SODIUM CHLORIDE, PRESERVATIVE FREE 40 MG: 5 INJECTION INTRAVENOUS at 12:10

## 2024-01-22 RX ADMIN — IPRATROPIUM BROMIDE AND ALBUTEROL SULFATE 1 DOSE: .5; 2.5 SOLUTION RESPIRATORY (INHALATION) at 19:15

## 2024-01-22 RX ADMIN — Medication 10 ML: at 21:00

## 2024-01-22 RX ADMIN — SODIUM CHLORIDE SOLN NEBU 3% 4 ML: 3 NEBU SOLN at 06:45

## 2024-01-22 RX ADMIN — IPRATROPIUM BROMIDE AND ALBUTEROL SULFATE 1 DOSE: .5; 2.5 SOLUTION RESPIRATORY (INHALATION) at 06:45

## 2024-01-22 RX ADMIN — CLONAZEPAM 1.5 MG: 0.5 TABLET, ORALLY DISINTEGRATING ORAL at 21:00

## 2024-01-22 RX ADMIN — CLOZAPINE 200 MG: 100 TABLET ORAL at 21:00

## 2024-01-22 RX ADMIN — CLONAZEPAM 0.5 MG: 0.5 TABLET, ORALLY DISINTEGRATING ORAL at 09:27

## 2024-01-22 RX ADMIN — BUDESONIDE INHALATION 500 MCG: 0.5 SUSPENSION RESPIRATORY (INHALATION) at 06:45

## 2024-01-22 RX ADMIN — SODIUM CHLORIDE, PRESERVATIVE FREE 10 ML: 5 INJECTION INTRAVENOUS at 12:10

## 2024-01-22 RX ADMIN — PIPERACILLIN AND TAZOBACTAM 3375 MG: 3; .375 INJECTION, POWDER, FOR SOLUTION INTRAVENOUS at 21:07

## 2024-01-22 RX ADMIN — BARIUM SULFATE 45 ML: 400 PASTE ORAL at 10:53

## 2024-01-22 RX ADMIN — Medication 10 ML: at 09:28

## 2024-01-22 RX ADMIN — IPRATROPIUM BROMIDE AND ALBUTEROL SULFATE 1 DOSE: .5; 2.5 SOLUTION RESPIRATORY (INHALATION) at 13:08

## 2024-01-22 ASSESSMENT — PAIN SCALES - PAIN ASSESSMENT IN ADVANCED DEMENTIA (PAINAD)
BREATHING: 0
TOTALSCORE: 0
CONSOLABILITY: 0
TOTALSCORE: 0
BREATHING: 0
CONSOLABILITY: 0
BODYLANGUAGE: 0
FACIALEXPRESSION: 0
NEGVOCALIZATION: 0
TOTALSCORE: 0
BODYLANGUAGE: 0
BREATHING: 0
FACIALEXPRESSION: 0
FACIALEXPRESSION: 0
CONSOLABILITY: 0
TOTALSCORE: 0
CONSOLABILITY: 0
NEGVOCALIZATION: 0
BREATHING: 0
NEGVOCALIZATION: 0
BODYLANGUAGE: 0
FACIALEXPRESSION: 0
NEGVOCALIZATION: 0
BODYLANGUAGE: 0

## 2024-01-22 ASSESSMENT — PAIN SCALES - GENERAL: PAINLEVEL_OUTOF10: 0

## 2024-01-22 ASSESSMENT — PAIN SCALES - WONG BAKER
WONGBAKER_NUMERICALRESPONSE: 0
WONGBAKER_NUMERICALRESPONSE: 0
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
## 2024-01-23 LAB
ALBUMIN SERPL-MCNC: 3 G/DL (ref 3.5–5.2)
ALP SERPL-CCNC: 132 U/L (ref 40–129)
ALT SERPL-CCNC: 280 U/L (ref 0–40)
ANION GAP SERPL CALCULATED.3IONS-SCNC: 6 MMOL/L (ref 7–16)
AST SERPL-CCNC: 108 U/L (ref 0–39)
BILIRUB SERPL-MCNC: 0.5 MG/DL (ref 0–1.2)
BUN SERPL-MCNC: 22 MG/DL (ref 6–23)
CALCIUM SERPL-MCNC: 8.5 MG/DL (ref 8.6–10.2)
CHLORIDE SERPL-SCNC: 104 MMOL/L (ref 98–107)
CO2 SERPL-SCNC: 31 MMOL/L (ref 22–29)
CREAT SERPL-MCNC: 1.4 MG/DL (ref 0.7–1.2)
CRP SERPL HS-MCNC: 13 MG/L (ref 0–5)
ERYTHROCYTE [DISTWIDTH] IN BLOOD BY AUTOMATED COUNT: 13.8 % (ref 11.5–15)
GFR SERPL CREATININE-BSD FRML MDRD: 54 ML/MIN/1.73M2
GLUCOSE BLD-MCNC: 115 MG/DL (ref 74–99)
GLUCOSE BLD-MCNC: 142 MG/DL (ref 74–99)
GLUCOSE BLD-MCNC: 165 MG/DL (ref 74–99)
GLUCOSE SERPL-MCNC: 104 MG/DL (ref 74–99)
HCT VFR BLD AUTO: 36 % (ref 37–54)
HGB BLD-MCNC: 11.6 G/DL (ref 12.5–16.5)
MCH RBC QN AUTO: 30.3 PG (ref 26–35)
MCHC RBC AUTO-ENTMCNC: 32.2 G/DL (ref 32–34.5)
MCV RBC AUTO: 94 FL (ref 80–99.9)
PLATELET # BLD AUTO: 430 K/UL (ref 130–450)
PMV BLD AUTO: 10.8 FL (ref 7–12)
POTASSIUM SERPL-SCNC: 3.9 MMOL/L (ref 3.5–5)
PROCALCITONIN SERPL-MCNC: 0.19 NG/ML (ref 0–0.08)
PROT SERPL-MCNC: 5.6 G/DL (ref 6.4–8.3)
RBC # BLD AUTO: 3.83 M/UL (ref 3.8–5.8)
SODIUM SERPL-SCNC: 141 MMOL/L (ref 132–146)
WBC OTHER # BLD: 17.7 K/UL (ref 4.5–11.5)

## 2024-01-23 PROCEDURE — 36415 COLL VENOUS BLD VENIPUNCTURE: CPT

## 2024-01-23 PROCEDURE — 84145 PROCALCITONIN (PCT): CPT

## 2024-01-23 PROCEDURE — 94640 AIRWAY INHALATION TREATMENT: CPT

## 2024-01-23 PROCEDURE — 99233 SBSQ HOSP IP/OBS HIGH 50: CPT | Performed by: INTERNAL MEDICINE

## 2024-01-23 PROCEDURE — 2700000000 HC OXYGEN THERAPY PER DAY

## 2024-01-23 PROCEDURE — 6370000000 HC RX 637 (ALT 250 FOR IP): Performed by: INTERNAL MEDICINE

## 2024-01-23 PROCEDURE — 86140 C-REACTIVE PROTEIN: CPT

## 2024-01-23 PROCEDURE — 6360000002 HC RX W HCPCS: Performed by: HOSPITALIST

## 2024-01-23 PROCEDURE — 6360000002 HC RX W HCPCS: Performed by: NURSE PRACTITIONER

## 2024-01-23 PROCEDURE — 97530 THERAPEUTIC ACTIVITIES: CPT

## 2024-01-23 PROCEDURE — 6360000002 HC RX W HCPCS: Performed by: INTERNAL MEDICINE

## 2024-01-23 PROCEDURE — 85027 COMPLETE CBC AUTOMATED: CPT

## 2024-01-23 PROCEDURE — 80053 COMPREHEN METABOLIC PANEL: CPT

## 2024-01-23 PROCEDURE — 2580000003 HC RX 258: Performed by: INTERNAL MEDICINE

## 2024-01-23 PROCEDURE — 2500000003 HC RX 250 WO HCPCS: Performed by: NURSE PRACTITIONER

## 2024-01-23 PROCEDURE — 2580000003 HC RX 258: Performed by: NURSE PRACTITIONER

## 2024-01-23 PROCEDURE — A4216 STERILE WATER/SALINE, 10 ML: HCPCS | Performed by: NURSE PRACTITIONER

## 2024-01-23 PROCEDURE — C9113 INJ PANTOPRAZOLE SODIUM, VIA: HCPCS | Performed by: NURSE PRACTITIONER

## 2024-01-23 PROCEDURE — 2060000000 HC ICU INTERMEDIATE R&B

## 2024-01-23 PROCEDURE — 2580000003 HC RX 258: Performed by: STUDENT IN AN ORGANIZED HEALTH CARE EDUCATION/TRAINING PROGRAM

## 2024-01-23 PROCEDURE — 6360000002 HC RX W HCPCS

## 2024-01-23 PROCEDURE — 2580000003 HC RX 258

## 2024-01-23 PROCEDURE — 82962 GLUCOSE BLOOD TEST: CPT

## 2024-01-23 RX ADMIN — IPRATROPIUM BROMIDE AND ALBUTEROL SULFATE 1 DOSE: .5; 2.5 SOLUTION RESPIRATORY (INHALATION) at 09:59

## 2024-01-23 RX ADMIN — PIPERACILLIN AND TAZOBACTAM 3375 MG: 3; .375 INJECTION, POWDER, FOR SOLUTION INTRAVENOUS at 14:39

## 2024-01-23 RX ADMIN — WATER 40 MG: 1 INJECTION INTRAMUSCULAR; INTRAVENOUS; SUBCUTANEOUS at 09:46

## 2024-01-23 RX ADMIN — CLONAZEPAM 0.25 MG: 0.5 TABLET, ORALLY DISINTEGRATING ORAL at 13:02

## 2024-01-23 RX ADMIN — BENZTROPINE MESYLATE 2 MG: 1 TABLET ORAL at 09:48

## 2024-01-23 RX ADMIN — METOCLOPRAMIDE 10 MG: 5 INJECTION, SOLUTION INTRAMUSCULAR; INTRAVENOUS at 09:47

## 2024-01-23 RX ADMIN — BENZTROPINE MESYLATE 2 MG: 1 TABLET ORAL at 22:18

## 2024-01-23 RX ADMIN — CLONAZEPAM 1.5 MG: 0.5 TABLET, ORALLY DISINTEGRATING ORAL at 22:40

## 2024-01-23 RX ADMIN — SODIUM CHLORIDE, PRESERVATIVE FREE 40 MG: 5 INJECTION INTRAVENOUS at 09:56

## 2024-01-23 RX ADMIN — IPRATROPIUM BROMIDE AND ALBUTEROL SULFATE 1 DOSE: .5; 2.5 SOLUTION RESPIRATORY (INHALATION) at 06:33

## 2024-01-23 RX ADMIN — Medication 10 ML: at 09:49

## 2024-01-23 RX ADMIN — Medication 10 ML: at 09:50

## 2024-01-23 RX ADMIN — Medication 10 ML: at 22:22

## 2024-01-23 RX ADMIN — PIPERACILLIN AND TAZOBACTAM 3375 MG: 3; .375 INJECTION, POWDER, FOR SOLUTION INTRAVENOUS at 22:49

## 2024-01-23 RX ADMIN — POTASSIUM PHOSPHATE, MONOBASIC POTASSIUM PHOSPHATE, DIBASIC 15 MMOL: 224; 236 INJECTION, SOLUTION, CONCENTRATE INTRAVENOUS at 14:33

## 2024-01-23 RX ADMIN — METOCLOPRAMIDE 10 MG: 5 INJECTION, SOLUTION INTRAMUSCULAR; INTRAVENOUS at 22:21

## 2024-01-23 RX ADMIN — SODIUM CHLORIDE, PRESERVATIVE FREE 40 MG: 5 INJECTION INTRAVENOUS at 22:21

## 2024-01-23 RX ADMIN — PIPERACILLIN AND TAZOBACTAM 3375 MG: 3; .375 INJECTION, POWDER, FOR SOLUTION INTRAVENOUS at 05:12

## 2024-01-23 RX ADMIN — BUDESONIDE INHALATION 500 MCG: 0.5 SUSPENSION RESPIRATORY (INHALATION) at 06:33

## 2024-01-23 RX ADMIN — CLOZAPINE 100 MG: 100 TABLET ORAL at 09:48

## 2024-01-23 RX ADMIN — BUDESONIDE INHALATION 500 MCG: 0.5 SUSPENSION RESPIRATORY (INHALATION) at 18:43

## 2024-01-23 RX ADMIN — IPRATROPIUM BROMIDE AND ALBUTEROL SULFATE 1 DOSE: .5; 2.5 SOLUTION RESPIRATORY (INHALATION) at 18:43

## 2024-01-23 RX ADMIN — SODIUM CHLORIDE SOLN NEBU 3% 4 ML: 3 NEBU SOLN at 06:33

## 2024-01-23 RX ADMIN — SODIUM CHLORIDE SOLN NEBU 3% 4 ML: 3 NEBU SOLN at 18:43

## 2024-01-23 RX ADMIN — METOCLOPRAMIDE 10 MG: 5 INJECTION, SOLUTION INTRAMUSCULAR; INTRAVENOUS at 14:40

## 2024-01-23 RX ADMIN — CLONAZEPAM 0.5 MG: 0.5 TABLET, ORALLY DISINTEGRATING ORAL at 09:48

## 2024-01-23 RX ADMIN — IPRATROPIUM BROMIDE AND ALBUTEROL SULFATE 1 DOSE: .5; 2.5 SOLUTION RESPIRATORY (INHALATION) at 14:58

## 2024-01-23 RX ADMIN — METOCLOPRAMIDE 10 MG: 5 INJECTION, SOLUTION INTRAMUSCULAR; INTRAVENOUS at 05:10

## 2024-01-23 RX ADMIN — ENOXAPARIN SODIUM 40 MG: 100 INJECTION SUBCUTANEOUS at 09:46

## 2024-01-23 RX ADMIN — CLOZAPINE 200 MG: 100 TABLET ORAL at 22:21

## 2024-01-23 ASSESSMENT — PAIN SCALES - GENERAL
PAINLEVEL_OUTOF10: 0

## 2024-01-23 ASSESSMENT — PAIN SCALES - PAIN ASSESSMENT IN ADVANCED DEMENTIA (PAINAD)
FACIALEXPRESSION: 0
CONSOLABILITY: 0
TOTALSCORE: 0
BODYLANGUAGE: 0
BREATHING: 0
NEGVOCALIZATION: 0

## 2024-01-23 ASSESSMENT — PAIN SCALES - WONG BAKER
WONGBAKER_NUMERICALRESPONSE: 0
WONGBAKER_NUMERICALRESPONSE: 0
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
## 2024-01-24 ENCOUNTER — APPOINTMENT (OUTPATIENT)
Dept: GENERAL RADIOLOGY | Age: 69
DRG: 871 | End: 2024-01-24
Payer: OTHER GOVERNMENT

## 2024-01-24 LAB
ALBUMIN SERPL-MCNC: 3 G/DL (ref 3.5–5.2)
ALP SERPL-CCNC: 146 U/L (ref 40–129)
ALT SERPL-CCNC: 351 U/L (ref 0–40)
ANION GAP SERPL CALCULATED.3IONS-SCNC: 10 MMOL/L (ref 7–16)
AST SERPL-CCNC: 137 U/L (ref 0–39)
BILIRUB SERPL-MCNC: 0.6 MG/DL (ref 0–1.2)
BUN SERPL-MCNC: 20 MG/DL (ref 6–23)
CALCIUM SERPL-MCNC: 9 MG/DL (ref 8.6–10.2)
CHLORIDE SERPL-SCNC: 102 MMOL/L (ref 98–107)
CK SERPL-CCNC: 39 U/L (ref 20–200)
CO2 SERPL-SCNC: 29 MMOL/L (ref 22–29)
CREAT SERPL-MCNC: 1.4 MG/DL (ref 0.7–1.2)
ERYTHROCYTE [DISTWIDTH] IN BLOOD BY AUTOMATED COUNT: 13.7 % (ref 11.5–15)
FERRITIN SERPL-MCNC: 502 NG/ML
GFR SERPL CREATININE-BSD FRML MDRD: 57 ML/MIN/1.73M2
GLUCOSE BLD-MCNC: 104 MG/DL (ref 74–99)
GLUCOSE BLD-MCNC: 106 MG/DL (ref 74–99)
GLUCOSE BLD-MCNC: 120 MG/DL (ref 74–99)
GLUCOSE BLD-MCNC: 97 MG/DL (ref 74–99)
GLUCOSE SERPL-MCNC: 104 MG/DL (ref 74–99)
HCT VFR BLD AUTO: 39 % (ref 37–54)
HGB BLD-MCNC: 12.5 G/DL (ref 12.5–16.5)
IRON SATN MFR SERPL: 29 % (ref 20–55)
IRON SERPL-MCNC: 58 UG/DL (ref 59–158)
MCH RBC QN AUTO: 30.2 PG (ref 26–35)
MCHC RBC AUTO-ENTMCNC: 32.1 G/DL (ref 32–34.5)
MCV RBC AUTO: 94.2 FL (ref 80–99.9)
PHOSPHATE SERPL-MCNC: 2.7 MG/DL (ref 2.5–4.5)
PLATELET # BLD AUTO: 493 K/UL (ref 130–450)
PMV BLD AUTO: 10.5 FL (ref 7–12)
POTASSIUM SERPL-SCNC: 3.9 MMOL/L (ref 3.5–5)
PROT SERPL-MCNC: 6.2 G/DL (ref 6.4–8.3)
RBC # BLD AUTO: 4.14 M/UL (ref 3.8–5.8)
SODIUM SERPL-SCNC: 141 MMOL/L (ref 132–146)
TIBC SERPL-MCNC: 200 UG/DL (ref 250–450)
WBC OTHER # BLD: 14 K/UL (ref 4.5–11.5)

## 2024-01-24 PROCEDURE — 80053 COMPREHEN METABOLIC PANEL: CPT

## 2024-01-24 PROCEDURE — A4216 STERILE WATER/SALINE, 10 ML: HCPCS | Performed by: NURSE PRACTITIONER

## 2024-01-24 PROCEDURE — 6360000002 HC RX W HCPCS: Performed by: HOSPITALIST

## 2024-01-24 PROCEDURE — 36415 COLL VENOUS BLD VENIPUNCTURE: CPT

## 2024-01-24 PROCEDURE — 2580000003 HC RX 258: Performed by: STUDENT IN AN ORGANIZED HEALTH CARE EDUCATION/TRAINING PROGRAM

## 2024-01-24 PROCEDURE — 82962 GLUCOSE BLOOD TEST: CPT

## 2024-01-24 PROCEDURE — 6360000002 HC RX W HCPCS: Performed by: INTERNAL MEDICINE

## 2024-01-24 PROCEDURE — 94640 AIRWAY INHALATION TREATMENT: CPT

## 2024-01-24 PROCEDURE — 6360000002 HC RX W HCPCS

## 2024-01-24 PROCEDURE — 83540 ASSAY OF IRON: CPT

## 2024-01-24 PROCEDURE — 2580000003 HC RX 258: Performed by: INTERNAL MEDICINE

## 2024-01-24 PROCEDURE — 2060000000 HC ICU INTERMEDIATE R&B

## 2024-01-24 PROCEDURE — 6370000000 HC RX 637 (ALT 250 FOR IP): Performed by: INTERNAL MEDICINE

## 2024-01-24 PROCEDURE — 6360000002 HC RX W HCPCS: Performed by: NURSE PRACTITIONER

## 2024-01-24 PROCEDURE — 2580000003 HC RX 258

## 2024-01-24 PROCEDURE — 2700000000 HC OXYGEN THERAPY PER DAY

## 2024-01-24 PROCEDURE — 2580000003 HC RX 258: Performed by: NURSE PRACTITIONER

## 2024-01-24 PROCEDURE — C9113 INJ PANTOPRAZOLE SODIUM, VIA: HCPCS | Performed by: NURSE PRACTITIONER

## 2024-01-24 PROCEDURE — 82550 ASSAY OF CK (CPK): CPT

## 2024-01-24 PROCEDURE — 82728 ASSAY OF FERRITIN: CPT

## 2024-01-24 PROCEDURE — 84100 ASSAY OF PHOSPHORUS: CPT

## 2024-01-24 PROCEDURE — 99232 SBSQ HOSP IP/OBS MODERATE 35: CPT | Performed by: INTERNAL MEDICINE

## 2024-01-24 PROCEDURE — 83550 IRON BINDING TEST: CPT

## 2024-01-24 PROCEDURE — 85027 COMPLETE CBC AUTOMATED: CPT

## 2024-01-24 RX ADMIN — METOCLOPRAMIDE 10 MG: 5 INJECTION, SOLUTION INTRAMUSCULAR; INTRAVENOUS at 11:15

## 2024-01-24 RX ADMIN — IPRATROPIUM BROMIDE AND ALBUTEROL SULFATE 1 DOSE: .5; 2.5 SOLUTION RESPIRATORY (INHALATION) at 18:27

## 2024-01-24 RX ADMIN — BUDESONIDE INHALATION 500 MCG: 0.5 SUSPENSION RESPIRATORY (INHALATION) at 06:34

## 2024-01-24 RX ADMIN — WATER 40 MG: 1 INJECTION INTRAMUSCULAR; INTRAVENOUS; SUBCUTANEOUS at 07:56

## 2024-01-24 RX ADMIN — CLONAZEPAM 1.5 MG: 0.5 TABLET, ORALLY DISINTEGRATING ORAL at 23:06

## 2024-01-24 RX ADMIN — IPRATROPIUM BROMIDE AND ALBUTEROL SULFATE 1 DOSE: .5; 2.5 SOLUTION RESPIRATORY (INHALATION) at 14:21

## 2024-01-24 RX ADMIN — BENZTROPINE MESYLATE 2 MG: 1 TABLET ORAL at 23:06

## 2024-01-24 RX ADMIN — METOCLOPRAMIDE 10 MG: 5 INJECTION, SOLUTION INTRAMUSCULAR; INTRAVENOUS at 23:06

## 2024-01-24 RX ADMIN — Medication 10 ML: at 23:06

## 2024-01-24 RX ADMIN — PIPERACILLIN AND TAZOBACTAM 3375 MG: 3; .375 INJECTION, POWDER, FOR SOLUTION INTRAVENOUS at 13:17

## 2024-01-24 RX ADMIN — SODIUM CHLORIDE SOLN NEBU 3% 4 ML: 3 NEBU SOLN at 18:27

## 2024-01-24 RX ADMIN — Medication 10 ML: at 11:14

## 2024-01-24 RX ADMIN — SODIUM CHLORIDE SOLN NEBU 3% 4 ML: 3 NEBU SOLN at 06:33

## 2024-01-24 RX ADMIN — BUDESONIDE INHALATION 500 MCG: 0.5 SUSPENSION RESPIRATORY (INHALATION) at 18:27

## 2024-01-24 RX ADMIN — IPRATROPIUM BROMIDE AND ALBUTEROL SULFATE 1 DOSE: .5; 2.5 SOLUTION RESPIRATORY (INHALATION) at 09:34

## 2024-01-24 RX ADMIN — ENOXAPARIN SODIUM 40 MG: 100 INJECTION SUBCUTANEOUS at 07:54

## 2024-01-24 RX ADMIN — BENZTROPINE MESYLATE 2 MG: 1 TABLET ORAL at 07:54

## 2024-01-24 RX ADMIN — METOCLOPRAMIDE 10 MG: 5 INJECTION, SOLUTION INTRAMUSCULAR; INTRAVENOUS at 05:20

## 2024-01-24 RX ADMIN — CLONAZEPAM 0.5 MG: 0.5 TABLET, ORALLY DISINTEGRATING ORAL at 07:57

## 2024-01-24 RX ADMIN — METOCLOPRAMIDE 10 MG: 5 INJECTION, SOLUTION INTRAMUSCULAR; INTRAVENOUS at 17:18

## 2024-01-24 RX ADMIN — PIPERACILLIN AND TAZOBACTAM 3375 MG: 3; .375 INJECTION, POWDER, FOR SOLUTION INTRAVENOUS at 05:26

## 2024-01-24 RX ADMIN — IPRATROPIUM BROMIDE AND ALBUTEROL SULFATE 1 DOSE: .5; 2.5 SOLUTION RESPIRATORY (INHALATION) at 06:32

## 2024-01-24 RX ADMIN — SODIUM CHLORIDE, PRESERVATIVE FREE 40 MG: 5 INJECTION INTRAVENOUS at 11:54

## 2024-01-24 RX ADMIN — CLOZAPINE 200 MG: 100 TABLET ORAL at 23:06

## 2024-01-24 ASSESSMENT — PAIN SCALES - PAIN ASSESSMENT IN ADVANCED DEMENTIA (PAINAD)
TOTALSCORE: 0
FACIALEXPRESSION: 0
BODYLANGUAGE: 0
BREATHING: 0
FACIALEXPRESSION: 0
CONSOLABILITY: 0
TOTALSCORE: 0
NEGVOCALIZATION: 0
BREATHING: 0
CONSOLABILITY: 0
BODYLANGUAGE: 0
NEGVOCALIZATION: 0

## 2024-01-24 ASSESSMENT — PAIN SCALES - GENERAL
PAINLEVEL_OUTOF10: 0
PAINLEVEL_OUTOF10: 0

## 2024-01-24 ASSESSMENT — PAIN SCALES - WONG BAKER: WONGBAKER_NUMERICALRESPONSE: 0

## 2024-01-24 NOTE — CARE COORDINATION
SOCIAL WORK / DISCHARGE PLANNING:  Pt no longer has sitter, 1.5 L O2. IV Solumedrol, IV Zosyn, IV Protonix. DON continues to review for [facility] for possible rehab. [facility] form would need completed. Tobi Young is to come today to encourage pt's rehab. If able to return home with elderly parents, would need HHC and possible O2 arranged via VA.           Electronically signed by KVNG Castano on 1/24/2024 at 1:47 PM

## 2024-01-25 LAB
ALBUMIN SERPL-MCNC: 2.8 G/DL (ref 3.5–5.2)
ALP SERPL-CCNC: 146 U/L (ref 40–129)
ALT SERPL-CCNC: 365 U/L (ref 0–40)
ANION GAP SERPL CALCULATED.3IONS-SCNC: 10 MMOL/L (ref 7–16)
AST SERPL-CCNC: 124 U/L (ref 0–39)
BILIRUB SERPL-MCNC: 0.6 MG/DL (ref 0–1.2)
BUN SERPL-MCNC: 20 MG/DL (ref 6–23)
CALCIUM SERPL-MCNC: 9.1 MG/DL (ref 8.6–10.2)
CHLORIDE SERPL-SCNC: 104 MMOL/L (ref 98–107)
CO2 SERPL-SCNC: 27 MMOL/L (ref 22–29)
CREAT SERPL-MCNC: 1.4 MG/DL (ref 0.7–1.2)
GFR SERPL CREATININE-BSD FRML MDRD: 55 ML/MIN/1.73M2
GLUCOSE BLD-MCNC: 150 MG/DL (ref 74–99)
GLUCOSE BLD-MCNC: 165 MG/DL (ref 74–99)
GLUCOSE BLD-MCNC: 171 MG/DL (ref 74–99)
GLUCOSE SERPL-MCNC: 111 MG/DL (ref 74–99)
POTASSIUM SERPL-SCNC: 4.3 MMOL/L (ref 3.5–5)
PROT SERPL-MCNC: 6.3 G/DL (ref 6.4–8.3)
SODIUM SERPL-SCNC: 141 MMOL/L (ref 132–146)

## 2024-01-25 PROCEDURE — C9113 INJ PANTOPRAZOLE SODIUM, VIA: HCPCS | Performed by: NURSE PRACTITIONER

## 2024-01-25 PROCEDURE — 2580000003 HC RX 258

## 2024-01-25 PROCEDURE — 82962 GLUCOSE BLOOD TEST: CPT

## 2024-01-25 PROCEDURE — 6360000002 HC RX W HCPCS

## 2024-01-25 PROCEDURE — 2700000000 HC OXYGEN THERAPY PER DAY

## 2024-01-25 PROCEDURE — 2580000003 HC RX 258: Performed by: NURSE PRACTITIONER

## 2024-01-25 PROCEDURE — 6360000002 HC RX W HCPCS: Performed by: NURSE PRACTITIONER

## 2024-01-25 PROCEDURE — 2580000003 HC RX 258: Performed by: INTERNAL MEDICINE

## 2024-01-25 PROCEDURE — 6360000002 HC RX W HCPCS: Performed by: INTERNAL MEDICINE

## 2024-01-25 PROCEDURE — 6370000000 HC RX 637 (ALT 250 FOR IP): Performed by: INTERNAL MEDICINE

## 2024-01-25 PROCEDURE — A4216 STERILE WATER/SALINE, 10 ML: HCPCS | Performed by: NURSE PRACTITIONER

## 2024-01-25 PROCEDURE — 99232 SBSQ HOSP IP/OBS MODERATE 35: CPT | Performed by: INTERNAL MEDICINE

## 2024-01-25 PROCEDURE — 36415 COLL VENOUS BLD VENIPUNCTURE: CPT

## 2024-01-25 PROCEDURE — 80053 COMPREHEN METABOLIC PANEL: CPT

## 2024-01-25 PROCEDURE — 2580000003 HC RX 258: Performed by: STUDENT IN AN ORGANIZED HEALTH CARE EDUCATION/TRAINING PROGRAM

## 2024-01-25 PROCEDURE — 2060000000 HC ICU INTERMEDIATE R&B

## 2024-01-25 PROCEDURE — 94640 AIRWAY INHALATION TREATMENT: CPT

## 2024-01-25 PROCEDURE — 6360000002 HC RX W HCPCS: Performed by: HOSPITALIST

## 2024-01-25 RX ORDER — PROCHLORPERAZINE EDISYLATE 5 MG/ML
5 INJECTION INTRAMUSCULAR; INTRAVENOUS EVERY 6 HOURS PRN
Status: DISCONTINUED | OUTPATIENT
Start: 2024-01-25 | End: 2024-01-29

## 2024-01-25 RX ADMIN — PROCHLORPERAZINE EDISYLATE 5 MG: 5 INJECTION INTRAMUSCULAR; INTRAVENOUS at 21:19

## 2024-01-25 RX ADMIN — IPRATROPIUM BROMIDE AND ALBUTEROL SULFATE 1 DOSE: .5; 2.5 SOLUTION RESPIRATORY (INHALATION) at 06:46

## 2024-01-25 RX ADMIN — Medication 10 ML: at 09:18

## 2024-01-25 RX ADMIN — CLOZAPINE 100 MG: 100 TABLET ORAL at 09:18

## 2024-01-25 RX ADMIN — Medication 10 ML: at 21:31

## 2024-01-25 RX ADMIN — SODIUM CHLORIDE, PRESERVATIVE FREE 40 MG: 5 INJECTION INTRAVENOUS at 21:54

## 2024-01-25 RX ADMIN — METOCLOPRAMIDE 10 MG: 5 INJECTION, SOLUTION INTRAMUSCULAR; INTRAVENOUS at 21:20

## 2024-01-25 RX ADMIN — IPRATROPIUM BROMIDE AND ALBUTEROL SULFATE 1 DOSE: .5; 2.5 SOLUTION RESPIRATORY (INHALATION) at 19:48

## 2024-01-25 RX ADMIN — ENOXAPARIN SODIUM 40 MG: 100 INJECTION SUBCUTANEOUS at 09:17

## 2024-01-25 RX ADMIN — PROCHLORPERAZINE EDISYLATE 10 MG: 5 INJECTION INTRAMUSCULAR; INTRAVENOUS at 09:16

## 2024-01-25 RX ADMIN — SODIUM CHLORIDE SOLN NEBU 3% 4 ML: 3 NEBU SOLN at 19:48

## 2024-01-25 RX ADMIN — CLONAZEPAM 1.5 MG: 0.5 TABLET, ORALLY DISINTEGRATING ORAL at 21:20

## 2024-01-25 RX ADMIN — CLONAZEPAM 0.5 MG: 0.5 TABLET, ORALLY DISINTEGRATING ORAL at 09:17

## 2024-01-25 RX ADMIN — Medication 10 ML: at 09:17

## 2024-01-25 RX ADMIN — IPRATROPIUM BROMIDE AND ALBUTEROL SULFATE 1 DOSE: .5; 2.5 SOLUTION RESPIRATORY (INHALATION) at 09:39

## 2024-01-25 RX ADMIN — SODIUM CHLORIDE, PRESERVATIVE FREE 40 MG: 5 INJECTION INTRAVENOUS at 11:47

## 2024-01-25 RX ADMIN — METOCLOPRAMIDE 10 MG: 5 INJECTION, SOLUTION INTRAMUSCULAR; INTRAVENOUS at 09:16

## 2024-01-25 RX ADMIN — METOCLOPRAMIDE 10 MG: 5 INJECTION, SOLUTION INTRAMUSCULAR; INTRAVENOUS at 15:38

## 2024-01-25 RX ADMIN — BENZTROPINE MESYLATE 2 MG: 1 TABLET ORAL at 21:53

## 2024-01-25 RX ADMIN — CLONAZEPAM 0.25 MG: 0.5 TABLET, ORALLY DISINTEGRATING ORAL at 15:37

## 2024-01-25 RX ADMIN — BENZTROPINE MESYLATE 2 MG: 1 TABLET ORAL at 09:16

## 2024-01-25 RX ADMIN — SODIUM CHLORIDE, PRESERVATIVE FREE 40 MG: 5 INJECTION INTRAVENOUS at 00:14

## 2024-01-25 RX ADMIN — ACETAMINOPHEN 500 MG: 500 TABLET ORAL at 09:16

## 2024-01-25 RX ADMIN — CLOZAPINE 200 MG: 100 TABLET ORAL at 21:53

## 2024-01-25 RX ADMIN — BUDESONIDE INHALATION 500 MCG: 0.5 SUSPENSION RESPIRATORY (INHALATION) at 06:46

## 2024-01-25 RX ADMIN — BUDESONIDE INHALATION 500 MCG: 0.5 SUSPENSION RESPIRATORY (INHALATION) at 19:48

## 2024-01-25 RX ADMIN — METOCLOPRAMIDE 10 MG: 5 INJECTION, SOLUTION INTRAMUSCULAR; INTRAVENOUS at 05:02

## 2024-01-25 RX ADMIN — WATER 40 MG: 1 INJECTION INTRAMUSCULAR; INTRAVENOUS; SUBCUTANEOUS at 09:16

## 2024-01-25 RX ADMIN — SODIUM CHLORIDE SOLN NEBU 3% 4 ML: 3 NEBU SOLN at 06:46

## 2024-01-25 RX ADMIN — IPRATROPIUM BROMIDE AND ALBUTEROL SULFATE 1 DOSE: .5; 2.5 SOLUTION RESPIRATORY (INHALATION) at 14:29

## 2024-01-25 ASSESSMENT — PAIN SCALES - GENERAL
PAINLEVEL_OUTOF10: 0

## 2024-01-25 ASSESSMENT — PAIN SCALES - PAIN ASSESSMENT IN ADVANCED DEMENTIA (PAINAD)
BODYLANGUAGE: 0
BREATHING: 0
BODYLANGUAGE: 0
FACIALEXPRESSION: 0
TOTALSCORE: 0
FACIALEXPRESSION: 0
NEGVOCALIZATION: 0
BREATHING: 0
CONSOLABILITY: 0
TOTALSCORE: 0
CONSOLABILITY: 0
BODYLANGUAGE: 0
BREATHING: 0
TOTALSCORE: 0
NEGVOCALIZATION: 0
NEGVOCALIZATION: 0
FACIALEXPRESSION: 0

## 2024-01-26 ENCOUNTER — APPOINTMENT (OUTPATIENT)
Dept: NUCLEAR MEDICINE | Age: 69
DRG: 871 | End: 2024-01-26
Payer: OTHER GOVERNMENT

## 2024-01-26 LAB
ALBUMIN SERPL-MCNC: 3.1 G/DL (ref 3.5–5.2)
ALP SERPL-CCNC: 133 U/L (ref 40–129)
ALT SERPL-CCNC: 280 U/L (ref 0–40)
AMMONIA PLAS-SCNC: 19 UMOL/L (ref 16–60)
ANION GAP SERPL CALCULATED.3IONS-SCNC: 10 MMOL/L (ref 7–16)
AST SERPL-CCNC: 73 U/L (ref 0–39)
BILIRUB SERPL-MCNC: 0.5 MG/DL (ref 0–1.2)
BUN SERPL-MCNC: 27 MG/DL (ref 6–23)
CALCIUM SERPL-MCNC: 8.6 MG/DL (ref 8.6–10.2)
CHLORIDE SERPL-SCNC: 105 MMOL/L (ref 98–107)
CO2 SERPL-SCNC: 28 MMOL/L (ref 22–29)
CREAT SERPL-MCNC: 1.4 MG/DL (ref 0.7–1.2)
GFR SERPL CREATININE-BSD FRML MDRD: 56 ML/MIN/1.73M2
GLUCOSE BLD-MCNC: 142 MG/DL (ref 74–99)
GLUCOSE BLD-MCNC: 178 MG/DL (ref 74–99)
GLUCOSE BLD-MCNC: 182 MG/DL (ref 74–99)
GLUCOSE SERPL-MCNC: 112 MG/DL (ref 74–99)
POTASSIUM SERPL-SCNC: 3.8 MMOL/L (ref 3.5–5)
PROT SERPL-MCNC: 6 G/DL (ref 6.4–8.3)
SODIUM SERPL-SCNC: 143 MMOL/L (ref 132–146)

## 2024-01-26 PROCEDURE — 82140 ASSAY OF AMMONIA: CPT

## 2024-01-26 PROCEDURE — 6360000002 HC RX W HCPCS: Performed by: NURSE PRACTITIONER

## 2024-01-26 PROCEDURE — 6370000000 HC RX 637 (ALT 250 FOR IP): Performed by: INTERNAL MEDICINE

## 2024-01-26 PROCEDURE — 36415 COLL VENOUS BLD VENIPUNCTURE: CPT

## 2024-01-26 PROCEDURE — 2580000003 HC RX 258: Performed by: NURSE PRACTITIONER

## 2024-01-26 PROCEDURE — 2580000003 HC RX 258

## 2024-01-26 PROCEDURE — A9537 TC99M MEBROFENIN: HCPCS | Performed by: RADIOLOGY

## 2024-01-26 PROCEDURE — 99232 SBSQ HOSP IP/OBS MODERATE 35: CPT | Performed by: INTERNAL MEDICINE

## 2024-01-26 PROCEDURE — 2060000000 HC ICU INTERMEDIATE R&B

## 2024-01-26 PROCEDURE — 6360000002 HC RX W HCPCS: Performed by: HOSPITALIST

## 2024-01-26 PROCEDURE — 6360000002 HC RX W HCPCS

## 2024-01-26 PROCEDURE — 6360000002 HC RX W HCPCS: Performed by: INTERNAL MEDICINE

## 2024-01-26 PROCEDURE — A4216 STERILE WATER/SALINE, 10 ML: HCPCS | Performed by: NURSE PRACTITIONER

## 2024-01-26 PROCEDURE — C9113 INJ PANTOPRAZOLE SODIUM, VIA: HCPCS | Performed by: NURSE PRACTITIONER

## 2024-01-26 PROCEDURE — 78226 HEPATOBILIARY SYSTEM IMAGING: CPT

## 2024-01-26 PROCEDURE — 2580000003 HC RX 258: Performed by: STUDENT IN AN ORGANIZED HEALTH CARE EDUCATION/TRAINING PROGRAM

## 2024-01-26 PROCEDURE — 94640 AIRWAY INHALATION TREATMENT: CPT

## 2024-01-26 PROCEDURE — 3430000000 HC RX DIAGNOSTIC RADIOPHARMACEUTICAL: Performed by: RADIOLOGY

## 2024-01-26 PROCEDURE — 2700000000 HC OXYGEN THERAPY PER DAY

## 2024-01-26 PROCEDURE — 2580000003 HC RX 258: Performed by: INTERNAL MEDICINE

## 2024-01-26 PROCEDURE — 94669 MECHANICAL CHEST WALL OSCILL: CPT

## 2024-01-26 PROCEDURE — 80053 COMPREHEN METABOLIC PANEL: CPT

## 2024-01-26 PROCEDURE — 82962 GLUCOSE BLOOD TEST: CPT

## 2024-01-26 RX ADMIN — IPRATROPIUM BROMIDE AND ALBUTEROL SULFATE 1 DOSE: .5; 2.5 SOLUTION RESPIRATORY (INHALATION) at 14:49

## 2024-01-26 RX ADMIN — IPRATROPIUM BROMIDE AND ALBUTEROL SULFATE 1 DOSE: .5; 2.5 SOLUTION RESPIRATORY (INHALATION) at 10:14

## 2024-01-26 RX ADMIN — CLONAZEPAM 0.25 MG: 0.5 TABLET, ORALLY DISINTEGRATING ORAL at 13:47

## 2024-01-26 RX ADMIN — CLONAZEPAM 1.5 MG: 0.5 TABLET, ORALLY DISINTEGRATING ORAL at 21:09

## 2024-01-26 RX ADMIN — IPRATROPIUM BROMIDE AND ALBUTEROL SULFATE 1 DOSE: .5; 2.5 SOLUTION RESPIRATORY (INHALATION) at 06:35

## 2024-01-26 RX ADMIN — Medication 10 ML: at 10:50

## 2024-01-26 RX ADMIN — BUDESONIDE INHALATION 500 MCG: 0.5 SUSPENSION RESPIRATORY (INHALATION) at 06:35

## 2024-01-26 RX ADMIN — BENZTROPINE MESYLATE 2 MG: 1 TABLET ORAL at 10:48

## 2024-01-26 RX ADMIN — CLONAZEPAM 0.5 MG: 0.5 TABLET, ORALLY DISINTEGRATING ORAL at 10:48

## 2024-01-26 RX ADMIN — CLOZAPINE 100 MG: 100 TABLET ORAL at 10:48

## 2024-01-26 RX ADMIN — Medication 10 ML: at 21:10

## 2024-01-26 RX ADMIN — SODIUM CHLORIDE, PRESERVATIVE FREE 40 MG: 5 INJECTION INTRAVENOUS at 12:42

## 2024-01-26 RX ADMIN — PROCHLORPERAZINE EDISYLATE 5 MG: 5 INJECTION INTRAMUSCULAR; INTRAVENOUS at 10:49

## 2024-01-26 RX ADMIN — METOCLOPRAMIDE 10 MG: 5 INJECTION, SOLUTION INTRAMUSCULAR; INTRAVENOUS at 04:44

## 2024-01-26 RX ADMIN — BENZTROPINE MESYLATE 2 MG: 1 TABLET ORAL at 21:09

## 2024-01-26 RX ADMIN — WATER 40 MG: 1 INJECTION INTRAMUSCULAR; INTRAVENOUS; SUBCUTANEOUS at 10:49

## 2024-01-26 RX ADMIN — CLOZAPINE 200 MG: 100 TABLET ORAL at 21:09

## 2024-01-26 RX ADMIN — METOCLOPRAMIDE 10 MG: 5 INJECTION, SOLUTION INTRAMUSCULAR; INTRAVENOUS at 10:49

## 2024-01-26 RX ADMIN — Medication 6 MILLICURIE: at 07:25

## 2024-01-26 RX ADMIN — SODIUM CHLORIDE, PRESERVATIVE FREE 40 MG: 5 INJECTION INTRAVENOUS at 22:17

## 2024-01-26 RX ADMIN — METOCLOPRAMIDE 10 MG: 5 INJECTION, SOLUTION INTRAMUSCULAR; INTRAVENOUS at 22:17

## 2024-01-26 RX ADMIN — METOCLOPRAMIDE 10 MG: 5 INJECTION, SOLUTION INTRAMUSCULAR; INTRAVENOUS at 17:07

## 2024-01-26 RX ADMIN — ENOXAPARIN SODIUM 40 MG: 100 INJECTION SUBCUTANEOUS at 10:49

## 2024-01-26 RX ADMIN — ACETAMINOPHEN 500 MG: 500 TABLET ORAL at 10:47

## 2024-01-26 RX ADMIN — SODIUM CHLORIDE SOLN NEBU 3% 4 ML: 3 NEBU SOLN at 06:35

## 2024-01-26 ASSESSMENT — PAIN SCALES - PAIN ASSESSMENT IN ADVANCED DEMENTIA (PAINAD)
NEGVOCALIZATION: 0
TOTALSCORE: 0
BODYLANGUAGE: 0
BREATHING: 0
BREATHING: 0
NEGVOCALIZATION: 0
FACIALEXPRESSION: 0
CONSOLABILITY: 0
BODYLANGUAGE: 0
CONSOLABILITY: 0
FACIALEXPRESSION: 0
FACIALEXPRESSION: 0
TOTALSCORE: 0
TOTALSCORE: 0
BODYLANGUAGE: 0
NEGVOCALIZATION: 0
BREATHING: 0
CONSOLABILITY: 0

## 2024-01-26 ASSESSMENT — PAIN SCALES - GENERAL
PAINLEVEL_OUTOF10: 0
PAINLEVEL_OUTOF10: 0

## 2024-01-26 NOTE — CARE COORDINATION
SOCIAL WORK / DISCHARGE PLANNING:  Rakesh,  for [facility], [facility] [phone], fax [phone] in to complete on site for pt. Pt is eating pureed diet with nectar thick with much encouragment. Per Dr Gallo PEG placment remains probable. Jenifer discussed case with [facility], will follow up on Monday to insure can accept. Pt could admit under his Medicare or through VA contract with [facility] form needing completed with [facility] and approval. He continues with  1L of O2 but continue to try to wean pt. Jenifer will follow.           Electronically signed by KVNG Castano on 1/26/2024 at 3:42 PM

## 2024-01-27 LAB
ALBUMIN SERPL-MCNC: 3.3 G/DL (ref 3.5–5.2)
ALP SERPL-CCNC: 145 U/L (ref 40–129)
ALT SERPL-CCNC: 274 U/L (ref 0–40)
ANION GAP SERPL CALCULATED.3IONS-SCNC: 10 MMOL/L (ref 7–16)
AST SERPL-CCNC: 70 U/L (ref 0–39)
BASOPHILS # BLD: 0.03 K/UL (ref 0–0.2)
BASOPHILS NFR BLD: 0 % (ref 0–2)
BILIRUB SERPL-MCNC: 0.5 MG/DL (ref 0–1.2)
BUN SERPL-MCNC: 25 MG/DL (ref 6–23)
CALCIUM SERPL-MCNC: 9.1 MG/DL (ref 8.6–10.2)
CHLORIDE SERPL-SCNC: 106 MMOL/L (ref 98–107)
CO2 SERPL-SCNC: 28 MMOL/L (ref 22–29)
CREAT SERPL-MCNC: 1.3 MG/DL (ref 0.7–1.2)
EOSINOPHIL # BLD: 0.04 K/UL (ref 0.05–0.5)
EOSINOPHILS RELATIVE PERCENT: 0 % (ref 0–6)
ERYTHROCYTE [DISTWIDTH] IN BLOOD BY AUTOMATED COUNT: 14.2 % (ref 11.5–15)
GFR SERPL CREATININE-BSD FRML MDRD: >60 ML/MIN/1.73M2
GLUCOSE BLD-MCNC: 104 MG/DL (ref 74–99)
GLUCOSE BLD-MCNC: 127 MG/DL (ref 74–99)
GLUCOSE BLD-MCNC: 142 MG/DL (ref 74–99)
GLUCOSE BLD-MCNC: 185 MG/DL (ref 74–99)
GLUCOSE SERPL-MCNC: 125 MG/DL (ref 74–99)
HCT VFR BLD AUTO: 41.2 % (ref 37–54)
HGB BLD-MCNC: 13.1 G/DL (ref 12.5–16.5)
IMM GRANULOCYTES # BLD AUTO: 0.05 K/UL (ref 0–0.58)
IMM GRANULOCYTES NFR BLD: 0 % (ref 0–5)
INR PPP: 1
LYMPHOCYTES NFR BLD: 1.17 K/UL (ref 1.5–4)
LYMPHOCYTES RELATIVE PERCENT: 10 % (ref 20–42)
MCH RBC QN AUTO: 30 PG (ref 26–35)
MCHC RBC AUTO-ENTMCNC: 31.8 G/DL (ref 32–34.5)
MCV RBC AUTO: 94.3 FL (ref 80–99.9)
MONOCYTES NFR BLD: 0.58 K/UL (ref 0.1–0.95)
MONOCYTES NFR BLD: 5 % (ref 2–12)
NEUTROPHILS NFR BLD: 85 % (ref 43–80)
NEUTS SEG NFR BLD: 10.24 K/UL (ref 1.8–7.3)
PLATELET # BLD AUTO: 388 K/UL (ref 130–450)
PMV BLD AUTO: 10.8 FL (ref 7–12)
POTASSIUM SERPL-SCNC: 3.8 MMOL/L (ref 3.5–5)
PROT SERPL-MCNC: 6.5 G/DL (ref 6.4–8.3)
PROTHROMBIN TIME: 11.7 SEC (ref 9.3–12.4)
RBC # BLD AUTO: 4.37 M/UL (ref 3.8–5.8)
SODIUM SERPL-SCNC: 144 MMOL/L (ref 132–146)
WBC OTHER # BLD: 12.1 K/UL (ref 4.5–11.5)

## 2024-01-27 PROCEDURE — 2700000000 HC OXYGEN THERAPY PER DAY

## 2024-01-27 PROCEDURE — 99232 SBSQ HOSP IP/OBS MODERATE 35: CPT | Performed by: INTERNAL MEDICINE

## 2024-01-27 PROCEDURE — 6360000002 HC RX W HCPCS: Performed by: INTERNAL MEDICINE

## 2024-01-27 PROCEDURE — 86707 HEPATITIS BE ANTIBODY: CPT

## 2024-01-27 PROCEDURE — 6370000000 HC RX 637 (ALT 250 FOR IP): Performed by: INTERNAL MEDICINE

## 2024-01-27 PROCEDURE — 36415 COLL VENOUS BLD VENIPUNCTURE: CPT

## 2024-01-27 PROCEDURE — 6360000002 HC RX W HCPCS: Performed by: NURSE PRACTITIONER

## 2024-01-27 PROCEDURE — 2580000003 HC RX 258: Performed by: STUDENT IN AN ORGANIZED HEALTH CARE EDUCATION/TRAINING PROGRAM

## 2024-01-27 PROCEDURE — 82962 GLUCOSE BLOOD TEST: CPT

## 2024-01-27 PROCEDURE — 94640 AIRWAY INHALATION TREATMENT: CPT

## 2024-01-27 PROCEDURE — 2580000003 HC RX 258: Performed by: INTERNAL MEDICINE

## 2024-01-27 PROCEDURE — 80053 COMPREHEN METABOLIC PANEL: CPT

## 2024-01-27 PROCEDURE — 2580000003 HC RX 258

## 2024-01-27 PROCEDURE — 85610 PROTHROMBIN TIME: CPT

## 2024-01-27 PROCEDURE — 80074 ACUTE HEPATITIS PANEL: CPT

## 2024-01-27 PROCEDURE — 85025 COMPLETE CBC W/AUTO DIFF WBC: CPT

## 2024-01-27 PROCEDURE — 6360000002 HC RX W HCPCS

## 2024-01-27 PROCEDURE — 6360000002 HC RX W HCPCS: Performed by: HOSPITALIST

## 2024-01-27 PROCEDURE — 2580000003 HC RX 258: Performed by: NURSE PRACTITIONER

## 2024-01-27 PROCEDURE — 86317 IMMUNOASSAY INFECTIOUS AGENT: CPT

## 2024-01-27 PROCEDURE — 87350 HEPATITIS BE AG IA: CPT

## 2024-01-27 PROCEDURE — C9113 INJ PANTOPRAZOLE SODIUM, VIA: HCPCS | Performed by: NURSE PRACTITIONER

## 2024-01-27 PROCEDURE — A4216 STERILE WATER/SALINE, 10 ML: HCPCS | Performed by: NURSE PRACTITIONER

## 2024-01-27 PROCEDURE — 2060000000 HC ICU INTERMEDIATE R&B

## 2024-01-27 RX ADMIN — SODIUM CHLORIDE, PRESERVATIVE FREE 40 MG: 5 INJECTION INTRAVENOUS at 23:52

## 2024-01-27 RX ADMIN — METOCLOPRAMIDE 10 MG: 5 INJECTION, SOLUTION INTRAMUSCULAR; INTRAVENOUS at 04:04

## 2024-01-27 RX ADMIN — IPRATROPIUM BROMIDE AND ALBUTEROL SULFATE 1 DOSE: .5; 2.5 SOLUTION RESPIRATORY (INHALATION) at 06:27

## 2024-01-27 RX ADMIN — BENZTROPINE MESYLATE 2 MG: 1 TABLET ORAL at 10:45

## 2024-01-27 RX ADMIN — METOCLOPRAMIDE 10 MG: 5 INJECTION, SOLUTION INTRAMUSCULAR; INTRAVENOUS at 16:39

## 2024-01-27 RX ADMIN — IPRATROPIUM BROMIDE AND ALBUTEROL SULFATE 1 DOSE: .5; 2.5 SOLUTION RESPIRATORY (INHALATION) at 13:30

## 2024-01-27 RX ADMIN — METOCLOPRAMIDE 10 MG: 5 INJECTION, SOLUTION INTRAMUSCULAR; INTRAVENOUS at 21:32

## 2024-01-27 RX ADMIN — CLONAZEPAM 1.5 MG: 0.5 TABLET, ORALLY DISINTEGRATING ORAL at 21:31

## 2024-01-27 RX ADMIN — SODIUM CHLORIDE, PRESERVATIVE FREE 40 MG: 5 INJECTION INTRAVENOUS at 11:55

## 2024-01-27 RX ADMIN — IPRATROPIUM BROMIDE AND ALBUTEROL SULFATE 1 DOSE: .5; 2.5 SOLUTION RESPIRATORY (INHALATION) at 09:35

## 2024-01-27 RX ADMIN — CLONAZEPAM 0.5 MG: 0.5 TABLET, ORALLY DISINTEGRATING ORAL at 10:45

## 2024-01-27 RX ADMIN — CLOZAPINE 100 MG: 100 TABLET ORAL at 10:45

## 2024-01-27 RX ADMIN — IPRATROPIUM BROMIDE AND ALBUTEROL SULFATE 1 DOSE: .5; 2.5 SOLUTION RESPIRATORY (INHALATION) at 18:21

## 2024-01-27 RX ADMIN — BENZTROPINE MESYLATE 2 MG: 1 TABLET ORAL at 21:32

## 2024-01-27 RX ADMIN — Medication 10 ML: at 10:46

## 2024-01-27 RX ADMIN — ACETAMINOPHEN 500 MG: 500 TABLET ORAL at 10:44

## 2024-01-27 RX ADMIN — BUDESONIDE INHALATION 500 MCG: 0.5 SUSPENSION RESPIRATORY (INHALATION) at 18:21

## 2024-01-27 RX ADMIN — Medication 10 ML: at 21:32

## 2024-01-27 RX ADMIN — METOCLOPRAMIDE 10 MG: 5 INJECTION, SOLUTION INTRAMUSCULAR; INTRAVENOUS at 10:44

## 2024-01-27 RX ADMIN — BUDESONIDE INHALATION 500 MCG: 0.5 SUSPENSION RESPIRATORY (INHALATION) at 06:27

## 2024-01-27 RX ADMIN — SODIUM CHLORIDE SOLN NEBU 3% 4 ML: 3 NEBU SOLN at 06:27

## 2024-01-27 RX ADMIN — CLONAZEPAM 0.25 MG: 0.5 TABLET, ORALLY DISINTEGRATING ORAL at 16:39

## 2024-01-27 RX ADMIN — ONDANSETRON 4 MG: 2 INJECTION INTRAMUSCULAR; INTRAVENOUS at 10:44

## 2024-01-27 RX ADMIN — WATER 40 MG: 1 INJECTION INTRAMUSCULAR; INTRAVENOUS; SUBCUTANEOUS at 10:44

## 2024-01-27 RX ADMIN — CLOZAPINE 200 MG: 100 TABLET ORAL at 21:32

## 2024-01-27 RX ADMIN — ENOXAPARIN SODIUM 40 MG: 100 INJECTION SUBCUTANEOUS at 10:45

## 2024-01-27 ASSESSMENT — PAIN SCALES - GENERAL
PAINLEVEL_OUTOF10: 0

## 2024-01-28 LAB
ALBUMIN SERPL-MCNC: 2.7 G/DL (ref 3.5–5.2)
ALP SERPL-CCNC: 130 U/L (ref 40–129)
ALT SERPL-CCNC: 198 U/L (ref 0–40)
ANION GAP SERPL CALCULATED.3IONS-SCNC: 10 MMOL/L (ref 7–16)
AST SERPL-CCNC: 53 U/L (ref 0–39)
BILIRUB SERPL-MCNC: 0.4 MG/DL (ref 0–1.2)
BUN SERPL-MCNC: 26 MG/DL (ref 6–23)
CALCIUM SERPL-MCNC: 8.8 MG/DL (ref 8.6–10.2)
CHLORIDE SERPL-SCNC: 109 MMOL/L (ref 98–107)
CO2 SERPL-SCNC: 25 MMOL/L (ref 22–29)
CREAT SERPL-MCNC: 1.3 MG/DL (ref 0.7–1.2)
GFR SERPL CREATININE-BSD FRML MDRD: >60 ML/MIN/1.73M2
GLUCOSE BLD-MCNC: 115 MG/DL (ref 74–99)
GLUCOSE BLD-MCNC: 115 MG/DL (ref 74–99)
GLUCOSE BLD-MCNC: 121 MG/DL (ref 74–99)
GLUCOSE BLD-MCNC: 142 MG/DL (ref 74–99)
GLUCOSE SERPL-MCNC: 128 MG/DL (ref 74–99)
HBV E AB SERPL QL IA: NEGATIVE
HBV E AG SERPL QL IA: NEGATIVE
MAGNESIUM SERPL-MCNC: 1.9 MG/DL (ref 1.6–2.6)
PHOSPHATE SERPL-MCNC: 1.2 MG/DL (ref 2.5–4.5)
POTASSIUM SERPL-SCNC: 3.9 MMOL/L (ref 3.5–5)
PROT SERPL-MCNC: 5.7 G/DL (ref 6.4–8.3)
SODIUM SERPL-SCNC: 144 MMOL/L (ref 132–146)

## 2024-01-28 PROCEDURE — 6360000002 HC RX W HCPCS: Performed by: INTERNAL MEDICINE

## 2024-01-28 PROCEDURE — 82962 GLUCOSE BLOOD TEST: CPT

## 2024-01-28 PROCEDURE — 80053 COMPREHEN METABOLIC PANEL: CPT

## 2024-01-28 PROCEDURE — C9113 INJ PANTOPRAZOLE SODIUM, VIA: HCPCS | Performed by: NURSE PRACTITIONER

## 2024-01-28 PROCEDURE — 6360000002 HC RX W HCPCS: Performed by: NURSE PRACTITIONER

## 2024-01-28 PROCEDURE — 36415 COLL VENOUS BLD VENIPUNCTURE: CPT

## 2024-01-28 PROCEDURE — 83735 ASSAY OF MAGNESIUM: CPT

## 2024-01-28 PROCEDURE — 94669 MECHANICAL CHEST WALL OSCILL: CPT

## 2024-01-28 PROCEDURE — 6370000000 HC RX 637 (ALT 250 FOR IP): Performed by: INTERNAL MEDICINE

## 2024-01-28 PROCEDURE — A4216 STERILE WATER/SALINE, 10 ML: HCPCS | Performed by: NURSE PRACTITIONER

## 2024-01-28 PROCEDURE — 6360000002 HC RX W HCPCS: Performed by: HOSPITALIST

## 2024-01-28 PROCEDURE — 94640 AIRWAY INHALATION TREATMENT: CPT

## 2024-01-28 PROCEDURE — 99232 SBSQ HOSP IP/OBS MODERATE 35: CPT | Performed by: INTERNAL MEDICINE

## 2024-01-28 PROCEDURE — 2580000003 HC RX 258: Performed by: STUDENT IN AN ORGANIZED HEALTH CARE EDUCATION/TRAINING PROGRAM

## 2024-01-28 PROCEDURE — 2060000000 HC ICU INTERMEDIATE R&B

## 2024-01-28 PROCEDURE — 2580000003 HC RX 258: Performed by: NURSE PRACTITIONER

## 2024-01-28 PROCEDURE — 2580000003 HC RX 258: Performed by: INTERNAL MEDICINE

## 2024-01-28 PROCEDURE — 84100 ASSAY OF PHOSPHORUS: CPT

## 2024-01-28 RX ADMIN — METOCLOPRAMIDE 10 MG: 5 INJECTION, SOLUTION INTRAMUSCULAR; INTRAVENOUS at 04:21

## 2024-01-28 RX ADMIN — CLONAZEPAM 0.25 MG: 0.5 TABLET, ORALLY DISINTEGRATING ORAL at 15:18

## 2024-01-28 RX ADMIN — BENZTROPINE MESYLATE 2 MG: 1 TABLET ORAL at 09:04

## 2024-01-28 RX ADMIN — CLOZAPINE 200 MG: 100 TABLET ORAL at 20:54

## 2024-01-28 RX ADMIN — Medication 10 ML: at 20:53

## 2024-01-28 RX ADMIN — SODIUM CHLORIDE, PRESERVATIVE FREE 40 MG: 5 INJECTION INTRAVENOUS at 23:53

## 2024-01-28 RX ADMIN — IPRATROPIUM BROMIDE AND ALBUTEROL SULFATE 1 DOSE: .5; 2.5 SOLUTION RESPIRATORY (INHALATION) at 09:24

## 2024-01-28 RX ADMIN — METOCLOPRAMIDE 10 MG: 5 INJECTION, SOLUTION INTRAMUSCULAR; INTRAVENOUS at 20:52

## 2024-01-28 RX ADMIN — CLONAZEPAM 0.5 MG: 0.5 TABLET, ORALLY DISINTEGRATING ORAL at 09:04

## 2024-01-28 RX ADMIN — CLONAZEPAM 1.5 MG: 0.5 TABLET, ORALLY DISINTEGRATING ORAL at 20:54

## 2024-01-28 RX ADMIN — METOCLOPRAMIDE 10 MG: 5 INJECTION, SOLUTION INTRAMUSCULAR; INTRAVENOUS at 15:17

## 2024-01-28 RX ADMIN — Medication 10 ML: at 20:55

## 2024-01-28 RX ADMIN — Medication 10 ML: at 09:05

## 2024-01-28 RX ADMIN — LORAZEPAM 0.5 MG: 2 INJECTION INTRAMUSCULAR; INTRAVENOUS at 02:47

## 2024-01-28 RX ADMIN — CLOZAPINE 100 MG: 100 TABLET ORAL at 09:04

## 2024-01-28 RX ADMIN — BENZTROPINE MESYLATE 2 MG: 1 TABLET ORAL at 20:53

## 2024-01-28 RX ADMIN — ENOXAPARIN SODIUM 40 MG: 100 INJECTION SUBCUTANEOUS at 09:15

## 2024-01-28 RX ADMIN — METOCLOPRAMIDE 10 MG: 5 INJECTION, SOLUTION INTRAMUSCULAR; INTRAVENOUS at 09:05

## 2024-01-28 RX ADMIN — BUDESONIDE INHALATION 500 MCG: 0.5 SUSPENSION RESPIRATORY (INHALATION) at 05:18

## 2024-01-28 RX ADMIN — SODIUM CHLORIDE, PRESERVATIVE FREE 40 MG: 5 INJECTION INTRAVENOUS at 11:48

## 2024-01-28 RX ADMIN — Medication 10 ML: at 09:16

## 2024-01-28 RX ADMIN — IPRATROPIUM BROMIDE AND ALBUTEROL SULFATE 1 DOSE: .5; 2.5 SOLUTION RESPIRATORY (INHALATION) at 05:18

## 2024-01-28 ASSESSMENT — PAIN SCALES - GENERAL: PAINLEVEL_OUTOF10: 0

## 2024-01-29 LAB
ALBUMIN SERPL-MCNC: 3 G/DL (ref 3.5–5.2)
ALP SERPL-CCNC: 115 U/L (ref 40–129)
ALT SERPL-CCNC: 183 U/L (ref 0–40)
ANION GAP SERPL CALCULATED.3IONS-SCNC: 8 MMOL/L (ref 7–16)
AST SERPL-CCNC: 44 U/L (ref 0–39)
BILIRUB SERPL-MCNC: 0.4 MG/DL (ref 0–1.2)
BUN SERPL-MCNC: 26 MG/DL (ref 6–23)
CALCIUM SERPL-MCNC: 8.7 MG/DL (ref 8.6–10.2)
CHLORIDE SERPL-SCNC: 110 MMOL/L (ref 98–107)
CO2 SERPL-SCNC: 28 MMOL/L (ref 22–29)
CREAT SERPL-MCNC: 1.2 MG/DL (ref 0.7–1.2)
GFR SERPL CREATININE-BSD FRML MDRD: >60 ML/MIN/1.73M2
GLUCOSE BLD-MCNC: 107 MG/DL (ref 74–99)
GLUCOSE BLD-MCNC: 110 MG/DL (ref 74–99)
GLUCOSE BLD-MCNC: 115 MG/DL (ref 74–99)
GLUCOSE BLD-MCNC: 125 MG/DL (ref 74–99)
GLUCOSE BLD-MCNC: 134 MG/DL (ref 74–99)
GLUCOSE BLD-MCNC: 99 MG/DL (ref 74–99)
GLUCOSE SERPL-MCNC: 101 MG/DL (ref 74–99)
HAV IGM SERPL QL IA: NONREACTIVE
HBV CORE IGM SERPL QL IA: NONREACTIVE
HBV SURFACE AB SERPL IA-ACNC: <3.1 MIU/ML (ref 0–9.99)
HBV SURFACE AG SERPL QL IA: NONREACTIVE
HCV AB SERPL QL IA: NONREACTIVE
MAGNESIUM SERPL-MCNC: 2 MG/DL (ref 1.6–2.6)
PHOSPHATE SERPL-MCNC: 1.6 MG/DL (ref 2.5–4.5)
POTASSIUM SERPL-SCNC: 3.7 MMOL/L (ref 3.5–5)
PROT SERPL-MCNC: 5.9 G/DL (ref 6.4–8.3)
SODIUM SERPL-SCNC: 146 MMOL/L (ref 132–146)

## 2024-01-29 PROCEDURE — 82962 GLUCOSE BLOOD TEST: CPT

## 2024-01-29 PROCEDURE — 36415 COLL VENOUS BLD VENIPUNCTURE: CPT

## 2024-01-29 PROCEDURE — 6370000000 HC RX 637 (ALT 250 FOR IP): Performed by: INTERNAL MEDICINE

## 2024-01-29 PROCEDURE — 83735 ASSAY OF MAGNESIUM: CPT

## 2024-01-29 PROCEDURE — 94669 MECHANICAL CHEST WALL OSCILL: CPT

## 2024-01-29 PROCEDURE — 97110 THERAPEUTIC EXERCISES: CPT

## 2024-01-29 PROCEDURE — 2500000003 HC RX 250 WO HCPCS: Performed by: INTERNAL MEDICINE

## 2024-01-29 PROCEDURE — 92526 ORAL FUNCTION THERAPY: CPT

## 2024-01-29 PROCEDURE — 2580000003 HC RX 258: Performed by: STUDENT IN AN ORGANIZED HEALTH CARE EDUCATION/TRAINING PROGRAM

## 2024-01-29 PROCEDURE — 84100 ASSAY OF PHOSPHORUS: CPT

## 2024-01-29 PROCEDURE — 97530 THERAPEUTIC ACTIVITIES: CPT

## 2024-01-29 PROCEDURE — 2580000003 HC RX 258: Performed by: INTERNAL MEDICINE

## 2024-01-29 PROCEDURE — 80053 COMPREHEN METABOLIC PANEL: CPT

## 2024-01-29 PROCEDURE — 94640 AIRWAY INHALATION TREATMENT: CPT

## 2024-01-29 PROCEDURE — 2060000000 HC ICU INTERMEDIATE R&B

## 2024-01-29 PROCEDURE — 6360000002 HC RX W HCPCS: Performed by: INTERNAL MEDICINE

## 2024-01-29 PROCEDURE — 6360000002 HC RX W HCPCS: Performed by: HOSPITALIST

## 2024-01-29 PROCEDURE — 2700000000 HC OXYGEN THERAPY PER DAY

## 2024-01-29 RX ORDER — NICOTINE 21 MG/24HR
1 PATCH, TRANSDERMAL 24 HOURS TRANSDERMAL DAILY
Qty: 30 PATCH | Refills: 3 | DISCHARGE
Start: 2024-01-30

## 2024-01-29 RX ORDER — PANTOPRAZOLE SODIUM 40 MG/1
40 TABLET, DELAYED RELEASE ORAL
Status: DISCONTINUED | OUTPATIENT
Start: 2024-01-29 | End: 2024-01-31 | Stop reason: HOSPADM

## 2024-01-29 RX ORDER — ONDANSETRON 4 MG/1
4 TABLET, ORALLY DISINTEGRATING ORAL EVERY 8 HOURS PRN
DISCHARGE
Start: 2024-01-29

## 2024-01-29 RX ORDER — METOCLOPRAMIDE 10 MG/1
10 TABLET ORAL 4 TIMES DAILY PRN
Qty: 120 TABLET | Refills: 3 | DISCHARGE
Start: 2024-01-29

## 2024-01-29 RX ORDER — METOCLOPRAMIDE 5 MG/1
10 TABLET ORAL 4 TIMES DAILY PRN
Status: DISCONTINUED | OUTPATIENT
Start: 2024-01-29 | End: 2024-01-31 | Stop reason: HOSPADM

## 2024-01-29 RX ORDER — PANTOPRAZOLE SODIUM 40 MG/1
40 TABLET, DELAYED RELEASE ORAL
Qty: 30 TABLET | Refills: 3 | DISCHARGE
Start: 2024-01-29

## 2024-01-29 RX ORDER — IPRATROPIUM BROMIDE AND ALBUTEROL SULFATE 2.5; .5 MG/3ML; MG/3ML
3 SOLUTION RESPIRATORY (INHALATION) EVERY 6 HOURS PRN
Qty: 360 ML | DISCHARGE
Start: 2024-01-29

## 2024-01-29 RX ORDER — METOCLOPRAMIDE 5 MG/1
10 TABLET ORAL
Status: DISCONTINUED | OUTPATIENT
Start: 2024-01-29 | End: 2024-01-29

## 2024-01-29 RX ADMIN — CLOZAPINE 100 MG: 100 TABLET ORAL at 08:33

## 2024-01-29 RX ADMIN — BENZTROPINE MESYLATE 2 MG: 1 TABLET ORAL at 08:34

## 2024-01-29 RX ADMIN — IPRATROPIUM BROMIDE AND ALBUTEROL SULFATE 1 DOSE: .5; 2.5 SOLUTION RESPIRATORY (INHALATION) at 10:07

## 2024-01-29 RX ADMIN — IPRATROPIUM BROMIDE AND ALBUTEROL SULFATE 1 DOSE: .5; 2.5 SOLUTION RESPIRATORY (INHALATION) at 13:19

## 2024-01-29 RX ADMIN — BENZTROPINE MESYLATE 2 MG: 1 TABLET ORAL at 21:02

## 2024-01-29 RX ADMIN — CLOZAPINE 200 MG: 100 TABLET ORAL at 21:02

## 2024-01-29 RX ADMIN — CLONAZEPAM 0.5 MG: 0.5 TABLET, ORALLY DISINTEGRATING ORAL at 08:33

## 2024-01-29 RX ADMIN — Medication 10 ML: at 21:05

## 2024-01-29 RX ADMIN — Medication 10 ML: at 08:35

## 2024-01-29 RX ADMIN — IPRATROPIUM BROMIDE AND ALBUTEROL SULFATE 1 DOSE: .5; 2.5 SOLUTION RESPIRATORY (INHALATION) at 18:46

## 2024-01-29 RX ADMIN — PANTOPRAZOLE SODIUM 40 MG: 40 TABLET, DELAYED RELEASE ORAL at 15:07

## 2024-01-29 RX ADMIN — Medication 10 ML: at 08:34

## 2024-01-29 RX ADMIN — BUDESONIDE INHALATION 500 MCG: 0.5 SUSPENSION RESPIRATORY (INHALATION) at 18:46

## 2024-01-29 RX ADMIN — IPRATROPIUM BROMIDE AND ALBUTEROL SULFATE 1 DOSE: .5; 2.5 SOLUTION RESPIRATORY (INHALATION) at 06:27

## 2024-01-29 RX ADMIN — SODIUM PHOSPHATE, MONOBASIC, MONOHYDRATE AND SODIUM PHOSPHATE, DIBASIC, ANHYDROUS 10.11 MMOL: 142; 276 INJECTION, SOLUTION INTRAVENOUS at 13:22

## 2024-01-29 RX ADMIN — METOCLOPRAMIDE 10 MG: 5 INJECTION, SOLUTION INTRAMUSCULAR; INTRAVENOUS at 04:41

## 2024-01-29 RX ADMIN — ENOXAPARIN SODIUM 40 MG: 100 INJECTION SUBCUTANEOUS at 08:33

## 2024-01-29 RX ADMIN — CLONAZEPAM 1.5 MG: 0.5 TABLET, ORALLY DISINTEGRATING ORAL at 21:02

## 2024-01-29 RX ADMIN — CLONAZEPAM 0.25 MG: 0.5 TABLET, ORALLY DISINTEGRATING ORAL at 15:06

## 2024-01-29 RX ADMIN — BUDESONIDE INHALATION 500 MCG: 0.5 SUSPENSION RESPIRATORY (INHALATION) at 06:27

## 2024-01-29 ASSESSMENT — PAIN SCALES - GENERAL
PAINLEVEL_OUTOF10: 0

## 2024-01-29 ASSESSMENT — PAIN SCALES - WONG BAKER: WONGBAKER_NUMERICALRESPONSE: 0

## 2024-01-29 NOTE — CARE COORDINATION
SOCIAL WORK / DISCHARGE PLANNING:   Jenifer left voice message for [facility] rep to discuss pt as may be ready for dc as early as today. No plan for PEG tube now. Pt must be fed and was eating 100%. Room air. Aldana for retention, unsure if will need at dc. [facility] form completed, spoke with MARY Munoz and faxed [facility] and clinical updates fax # [phone]. She will process this afternoon to be approved for VA contract. OBEY will need signed by physician. HENS form will need completed prior to dc.     Addendum: 125pm. Jenifer spoke with brotherTobi at bedside. Discussed SNF vs home and he still wishes for SNF until he can see if pt can become more like functional as he was at home, ambulatory and continent.     Addendum: 305pm. Pt was accepted to [facility], [facility] [phone], fax [phone] but can not accept until VA auth comes through. Juliane has spoken to Tammy Burgess. OBEY will need signed by physician. HENS form initiated, will need completed when dc order is obtained.     Electronically signed by KVNG Castano on 1/29/2024 at 12:14 PM

## 2024-01-29 NOTE — DISCHARGE INSTR - COC
[phone]  Fax:    Dialysis Facility (if applicable)   Name:  Address:  Dialysis Schedule:  Phone:  Fax:    / signature: Electronically signed by Gely Altamirano RN on 1/30/24 at 1:47 PM EST    PHYSICIAN SECTION    Prognosis: Good    Condition at Discharge: Stable    Rehab Potential (if transferring to Rehab): Good    Recommended Labs or Other Treatments After Discharge: ***    Physician Certification: I certify the above information and transfer of Aureliano Falcon  is necessary for the continuing treatment of the diagnosis listed and that he requires Skilled Nursing Facility for less 30 days.     Update Admission H&P: {CHP DME Changes in HandP:[id number]}    PHYSICIAN SIGNATURE:  Electronically signed by Michael Gallo DO on 1/29/24 at 1:38 PM EST

## 2024-01-30 VITALS
HEART RATE: 96 BPM | SYSTOLIC BLOOD PRESSURE: 149 MMHG | WEIGHT: 134.4 LBS | BODY MASS INDEX: 22.94 KG/M2 | DIASTOLIC BLOOD PRESSURE: 73 MMHG | RESPIRATION RATE: 25 BRPM | OXYGEN SATURATION: 99 % | HEIGHT: 64 IN | TEMPERATURE: 99.3 F

## 2024-01-30 LAB
GLUCOSE BLD-MCNC: 112 MG/DL (ref 74–99)
GLUCOSE BLD-MCNC: 120 MG/DL (ref 74–99)
GLUCOSE BLD-MCNC: 126 MG/DL (ref 74–99)
GLUCOSE BLD-MCNC: 138 MG/DL (ref 74–99)
SURGICAL PATHOLOGY REPORT: NORMAL

## 2024-01-30 PROCEDURE — 6370000000 HC RX 637 (ALT 250 FOR IP): Performed by: INTERNAL MEDICINE

## 2024-01-30 PROCEDURE — 82962 GLUCOSE BLOOD TEST: CPT

## 2024-01-30 PROCEDURE — 94640 AIRWAY INHALATION TREATMENT: CPT

## 2024-01-30 PROCEDURE — 6360000002 HC RX W HCPCS: Performed by: INTERNAL MEDICINE

## 2024-01-30 PROCEDURE — 2060000000 HC ICU INTERMEDIATE R&B

## 2024-01-30 PROCEDURE — 2580000003 HC RX 258: Performed by: STUDENT IN AN ORGANIZED HEALTH CARE EDUCATION/TRAINING PROGRAM

## 2024-01-30 RX ADMIN — BUDESONIDE INHALATION 500 MCG: 0.5 SUSPENSION RESPIRATORY (INHALATION) at 06:43

## 2024-01-30 RX ADMIN — Medication 10 ML: at 08:07

## 2024-01-30 RX ADMIN — CLONAZEPAM 1.5 MG: 0.5 TABLET, ORALLY DISINTEGRATING ORAL at 21:41

## 2024-01-30 RX ADMIN — CLONAZEPAM 0.5 MG: 0.5 TABLET, ORALLY DISINTEGRATING ORAL at 08:06

## 2024-01-30 RX ADMIN — BENZTROPINE MESYLATE 2 MG: 1 TABLET ORAL at 08:07

## 2024-01-30 RX ADMIN — ENOXAPARIN SODIUM 40 MG: 100 INJECTION SUBCUTANEOUS at 08:06

## 2024-01-30 RX ADMIN — CLONAZEPAM 0.25 MG: 0.5 TABLET, ORALLY DISINTEGRATING ORAL at 15:27

## 2024-01-30 RX ADMIN — BENZTROPINE MESYLATE 2 MG: 1 TABLET ORAL at 21:42

## 2024-01-30 RX ADMIN — IPRATROPIUM BROMIDE AND ALBUTEROL SULFATE 1 DOSE: .5; 2.5 SOLUTION RESPIRATORY (INHALATION) at 09:33

## 2024-01-30 RX ADMIN — CLOZAPINE 200 MG: 100 TABLET ORAL at 21:42

## 2024-01-30 RX ADMIN — PANTOPRAZOLE SODIUM 40 MG: 40 TABLET, DELAYED RELEASE ORAL at 06:14

## 2024-01-30 RX ADMIN — IPRATROPIUM BROMIDE AND ALBUTEROL SULFATE 1 DOSE: .5; 2.5 SOLUTION RESPIRATORY (INHALATION) at 06:43

## 2024-01-30 RX ADMIN — PANTOPRAZOLE SODIUM 40 MG: 40 TABLET, DELAYED RELEASE ORAL at 17:23

## 2024-01-30 RX ADMIN — CLOZAPINE 100 MG: 100 TABLET ORAL at 08:07

## 2024-01-30 NOTE — PLAN OF CARE
Problem: Discharge Planning  Goal: Discharge to home or other facility with appropriate resources  1/11/2024 1525 by Kristen Cottrell RN  Outcome: Progressing  Flowsheets (Taken 1/11/2024 0800)  Discharge to home or other facility with appropriate resources:   Identify barriers to discharge with patient and caregiver   Arrange for needed discharge resources and transportation as appropriate   Identify discharge learning needs (meds, wound care, etc)   Refer to discharge planning if patient needs post-hospital services based on physician order or complex needs related to functional status, cognitive ability or social support system  1/11/2024 0140 by Charleen Ramirez RN  Outcome: Progressing  Flowsheets (Taken 1/10/2024 0800 by Elena Davidson, RN)  Discharge to home or other facility with appropriate resources: Identify barriers to discharge with patient and caregiver     Problem: Skin/Tissue Integrity  Goal: Absence of new skin breakdown  Description: 1.  Monitor for areas of redness and/or skin breakdown  2.  Assess vascular access sites hourly  3.  Every 4-6 hours minimum:  Change oxygen saturation probe site  4.  Every 4-6 hours:  If on nasal continuous positive airway pressure, respiratory therapy assess nares and determine need for appliance change or resting period.  1/11/2024 1525 by Kristen Cottrell RN  Outcome: Progressing  1/11/2024 0140 by Charleen Ramirez RN  Outcome: Progressing     Problem: Safety - Adult  Goal: Free from fall injury  Outcome: Progressing  Flowsheets (Taken 1/11/2024 0800)  Free From Fall Injury: Based on caregiver fall risk screen, instruct family/caregiver to ask for assistance with transferring infant if caregiver noted to have fall risk factors     Problem: ABCDS Injury Assessment  Goal: Absence of physical injury  1/11/2024 1525 by Kristen Cottrell, RN  Outcome: Progressing  Flowsheets (Taken 1/11/2024 0800)  Absence of Physical Injury: Implement safety measures based on patient 
  Problem: Discharge Planning  Goal: Discharge to home or other facility with appropriate resources  1/15/2024 1516 by Gay Fung RN  Outcome: Progressing     Problem: Skin/Tissue Integrity  Goal: Absence of new skin breakdown  Description: 1.  Monitor for areas of redness and/or skin breakdown  2.  Assess vascular access sites hourly  3.  Every 4-6 hours minimum:  Change oxygen saturation probe site  4.  Every 4-6 hours:  If on nasal continuous positive airway pressure, respiratory therapy assess nares and determine need for appliance change or resting period.  1/15/2024 1516 by Gay Fung RN  Outcome: Progressing     Problem: Safety - Adult  Goal: Free from fall injury  1/15/2024 1516 by Gay Fung RN  Outcome: Progressing     Problem: Nutrition Deficit:  Goal: Optimize nutritional status  1/15/2024 1102 by Andra Krishnan RD  Outcome: Not Progressing  Flowsheets (Taken 1/15/2024 1100)  Nutrient intake appropriate for improving, restoring, or maintaining nutritional needs:   Assess nutritional status and recommend course of action   Recommend appropriate diets, oral nutritional supplements, and vitamin/mineral supplements   Monitor oral intake, labs, and treatment plans   Recommend, monitor, and adjust tube feedings and TPN/PPN based on assessed needs  1/15/2024 1102 by Andra Krishnan RD  Outcome: Not Progressing  1/15/2024 1101 by Andra Krishnan RD  Outcome: Not Progressing  1/15/2024 1101 by Andra Krishnan RD  Flowsheets (Taken 1/15/2024 1100)  Nutrient intake appropriate for improving, restoring, or maintaining nutritional needs:   Assess nutritional status and recommend course of action   Recommend appropriate diets, oral nutritional supplements, and vitamin/mineral supplements   Monitor oral intake, labs, and treatment plans   Recommend, monitor, and adjust tube feedings and TPN/PPN based on assessed needs  1/15/2024 1100 by Andra Krishnan RD  Flowsheets (Taken 1/15/2024 
  Problem: Discharge Planning  Goal: Discharge to home or other facility with appropriate resources  1/16/2024 0207 by Josr Lane, RN  Outcome: Progressing     Problem: Skin/Tissue Integrity  Goal: Absence of new skin breakdown  Description: 1.  Monitor for areas of redness and/or skin breakdown  2.  Assess vascular access sites hourly  3.  Every 4-6 hours minimum:  Change oxygen saturation probe site  4.  Every 4-6 hours:  If on nasal continuous positive airway pressure, respiratory therapy assess nares and determine need for appliance change or resting period.  1/16/2024 0207 by Josr Lane, RN  Outcome: Progressing     Problem: Safety - Adult  Goal: Free from fall injury  1/16/2024 0207 by Josr Lane, RN  Outcome: Progressing     Problem: ABCDS Injury Assessment  Goal: Absence of physical injury  Outcome: Progressing     Problem: Confusion  Goal: Confusion, delirium, dementia, or psychosis is improved or at baseline  Description: INTERVENTIONS:  1. Assess for possible contributors to thought disturbance, including medications, impaired vision or hearing, underlying metabolic abnormalities, dehydration, psychiatric diagnoses, and notify attending LIP  2. [place] high risk fall precautions, as indicated  3. Provide frequent short contacts to provide reality reorientation, refocusing and direction  4. Decrease environmental stimuli, including noise as appropriate  5. Monitor and intervene to maintain adequate nutrition, hydration, elimination, sleep and activity  6. If unable to ensure safety without constant attention obtain sitter and review sitter guidelines with assigned personnel  7. Initiate Psychosocial CNS and Spiritual Care consult, as indicated  Outcome: Progressing     Problem: Pain  Goal: Verbalizes/displays adequate comfort level or baseline comfort level  Outcome: Progressing     Problem: Nutrition Deficit:  Goal: Optimize nutritional status  Outcome: Progressing     
  Problem: Discharge Planning  Goal: Discharge to home or other facility with appropriate resources  1/28/2024 2223 by Gilligan, Melissa, RN  Outcome: Progressing  Flowsheets (Taken 1/28/2024 2000)  Discharge to home or other facility with appropriate resources: Identify barriers to discharge with patient and caregiver  1/28/2024 1711 by Kathy Dubois RN  Outcome: Progressing     Problem: Skin/Tissue Integrity  Goal: Absence of new skin breakdown  Description: 1.  Monitor for areas of redness and/or skin breakdown  2.  Assess vascular access sites hourly  3.  Every 4-6 hours minimum:  Change oxygen saturation probe site  4.  Every 4-6 hours:  If on nasal continuous positive airway pressure, respiratory therapy assess nares and determine need for appliance change or resting period.  1/28/2024 2223 by Gilligan, Melissa, RN  Outcome: Progressing  1/28/2024 1711 by Kathy Dubois RN  Outcome: Progressing     Problem: Safety - Adult  Goal: Free from fall injury  1/28/2024 2223 by Gilligan, Melissa, RN  Outcome: Progressing  Flowsheets (Taken 1/28/2024 2222)  Free From Fall Injury: Instruct family/caregiver on patient safety  1/28/2024 1711 by Kathy Dubois RN  Outcome: Progressing     Problem: ABCDS Injury Assessment  Goal: Absence of physical injury  1/28/2024 2223 by Gilligan, Melissa, RN  Outcome: Progressing  1/28/2024 1711 by Kathy Dubois RN  Outcome: Progressing     Problem: Confusion  Goal: Confusion, delirium, dementia, or psychosis is improved or at baseline  Description: INTERVENTIONS:  1. Assess for possible contributors to thought disturbance, including medications, impaired vision or hearing, underlying metabolic abnormalities, dehydration, psychiatric diagnoses, and notify attending LIP  2. [place] high risk fall precautions, as indicated  3. Provide frequent short contacts to provide reality reorientation, refocusing and direction  4. Decrease environmental stimuli, including noise as appropriate  5. 
  Problem: Discharge Planning  Goal: Discharge to home or other facility with appropriate resources  1/30/2024 1029 by Kristen Cottrell RN  Outcome: Progressing  Flowsheets (Taken 1/30/2024 0810)  Discharge to home or other facility with appropriate resources:   Identify barriers to discharge with patient and caregiver   Arrange for needed discharge resources and transportation as appropriate   Identify discharge learning needs (meds, wound care, etc)   Refer to discharge planning if patient needs post-hospital services based on physician order or complex needs related to functional status, cognitive ability or social support system  1/29/2024 2138 by Gilligan, Melissa, RN  Outcome: Progressing  Flowsheets  Taken 1/29/2024 2000 by Gilligan, Melissa, RN  Discharge to home or other facility with appropriate resources: Identify barriers to discharge with patient and caregiver  Taken 1/29/2024 0800 by Kristen Cottrell RN  Discharge to home or other facility with appropriate resources:   Identify barriers to discharge with patient and caregiver   Arrange for needed discharge resources and transportation as appropriate   Identify discharge learning needs (meds, wound care, etc)   Refer to discharge planning if patient needs post-hospital services based on physician order or complex needs related to functional status, cognitive ability or social support system     Problem: Skin/Tissue Integrity  Goal: Absence of new skin breakdown  Description: 1.  Monitor for areas of redness and/or skin breakdown  2.  Assess vascular access sites hourly  3.  Every 4-6 hours minimum:  Change oxygen saturation probe site  4.  Every 4-6 hours:  If on nasal continuous positive airway pressure, respiratory therapy assess nares and determine need for appliance change or resting period.  1/30/2024 1029 by Kristen Cottrell, RN  Outcome: Progressing  1/29/2024 2138 by Gilligan, Melissa, RN  Outcome: Progressing     Problem: Safety - Adult  Goal: Free 
  Problem: Discharge Planning  Goal: Discharge to home or other facility with appropriate resources  Outcome: Progressing     Problem: Skin/Tissue Integrity  Goal: Absence of new skin breakdown  Description: 1.  Monitor for areas of redness and/or skin breakdown  2.  Assess vascular access sites hourly  3.  Every 4-6 hours minimum:  Change oxygen saturation probe site  4.  Every 4-6 hours:  If on nasal continuous positive airway pressure, respiratory therapy assess nares and determine need for appliance change or resting period.  Outcome: Progressing     Problem: Safety - Adult  Goal: Free from fall injury  Outcome: Progressing     Problem: ABCDS Injury Assessment  Goal: Absence of physical injury  Outcome: Progressing     Problem: Confusion  Goal: Confusion, delirium, dementia, or psychosis is improved or at baseline  Description: INTERVENTIONS:  1. Assess for possible contributors to thought disturbance, including medications, impaired vision or hearing, underlying metabolic abnormalities, dehydration, psychiatric diagnoses, and notify attending LIP  2. [place] high risk fall precautions, as indicated  3. Provide frequent short contacts to provide reality reorientation, refocusing and direction  4. Decrease environmental stimuli, including noise as appropriate  5. Monitor and intervene to maintain adequate nutrition, hydration, elimination, sleep and activity  6. If unable to ensure safety without constant attention obtain sitter and review sitter guidelines with assigned personnel  7. Initiate Psychosocial CNS and Spiritual Care consult, as indicated  Outcome: Progressing     Problem: Pain  Goal: Verbalizes/displays adequate comfort level or baseline comfort level  Outcome: Progressing     Problem: Nutrition Deficit:  Goal: Optimize nutritional status  Outcome: Progressing     
  Problem: Discharge Planning  Goal: Discharge to home or other facility with appropriate resources  Outcome: Progressing     Problem: Skin/Tissue Integrity  Goal: Absence of new skin breakdown  Description: 1.  Monitor for areas of redness and/or skin breakdown  2.  Assess vascular access sites hourly  3.  Every 4-6 hours minimum:  Change oxygen saturation probe site  4.  Every 4-6 hours:  If on nasal continuous positive airway pressure, respiratory therapy assess nares and determine need for appliance change or resting period.  Outcome: Progressing     Problem: Safety - Adult  Goal: Free from fall injury  Outcome: Progressing     Problem: ABCDS Injury Assessment  Goal: Absence of physical injury  Outcome: Progressing     Problem: Confusion  Goal: Confusion, delirium, dementia, or psychosis is improved or at baseline  Description: INTERVENTIONS:  1. Assess for possible contributors to thought disturbance, including medications, impaired vision or hearing, underlying metabolic abnormalities, dehydration, psychiatric diagnoses, and notify attending LIP  2. [place] high risk fall precautions, as indicated  3. Provide frequent short contacts to provide reality reorientation, refocusing and direction  4. Decrease environmental stimuli, including noise as appropriate  5. Monitor and intervene to maintain adequate nutrition, hydration, elimination, sleep and activity  6. If unable to ensure safety without constant attention obtain sitter and review sitter guidelines with assigned personnel  7. Initiate Psychosocial CNS and Spiritual Care consult, as indicated  Outcome: Progressing     Problem: Pain  Goal: Verbalizes/displays adequate comfort level or baseline comfort level  Outcome: Progressing     Problem: Nutrition Deficit:  Goal: Optimize nutritional status  Outcome: Progressing     Problem: Musculoskeletal - Adult  Goal: Return mobility to safest level of function  Outcome: Progressing  Goal: Return ADL status to a 
  Problem: Discharge Planning  Goal: Discharge to home or other facility with appropriate resources  Outcome: Progressing     Problem: Skin/Tissue Integrity  Goal: Absence of new skin breakdown  Description: 1.  Monitor for areas of redness and/or skin breakdown  2.  Assess vascular access sites hourly  3.  Every 4-6 hours minimum:  Change oxygen saturation probe site  4.  Every 4-6 hours:  If on nasal continuous positive airway pressure, respiratory therapy assess nares and determine need for appliance change or resting period.  Outcome: Progressing     Problem: Safety - Adult  Goal: Free from fall injury  Outcome: Progressing     Problem: ABCDS Injury Assessment  Goal: Absence of physical injury  Outcome: Progressing     Problem: Confusion  Goal: Confusion, delirium, dementia, or psychosis is improved or at baseline  Description: INTERVENTIONS:  1. Assess for possible contributors to thought disturbance, including medications, impaired vision or hearing, underlying metabolic abnormalities, dehydration, psychiatric diagnoses, and notify attending LIP  2. [place] high risk fall precautions, as indicated  3. Provide frequent short contacts to provide reality reorientation, refocusing and direction  4. Decrease environmental stimuli, including noise as appropriate  5. Monitor and intervene to maintain adequate nutrition, hydration, elimination, sleep and activity  6. If unable to ensure safety without constant attention obtain sitter and review sitter guidelines with assigned personnel  7. Initiate Psychosocial CNS and Spiritual Care consult, as indicated  Outcome: Progressing     Problem: Pain  Goal: Verbalizes/displays adequate comfort level or baseline comfort level  Outcome: Progressing     Problem: Nutrition Deficit:  Goal: Optimize nutritional status  1/22/2024 1130 by Andra Krishnan RD  Outcome: Not Progressing  Flowsheets (Taken 1/22/2024 1113)  Nutrient intake appropriate for improving, restoring, or 
  Problem: Discharge Planning  Goal: Discharge to home or other facility with appropriate resources  Outcome: Progressing     Problem: Skin/Tissue Integrity  Goal: Absence of new skin breakdown  Description: 1.  Monitor for areas of redness and/or skin breakdown  2.  Assess vascular access sites hourly  3.  Every 4-6 hours minimum:  Change oxygen saturation probe site  4.  Every 4-6 hours:  If on nasal continuous positive airway pressure, respiratory therapy assess nares and determine need for appliance change or resting period.  Outcome: Progressing     Problem: Safety - Adult  Goal: Free from fall injury  Outcome: Progressing  Flowsheets (Taken 1/8/2024 2000)  Free From Fall Injury: Instruct family/caregiver on patient safety     Problem: ABCDS Injury Assessment  Goal: Absence of physical injury  Outcome: Progressing     
  Problem: Discharge Planning  Goal: Discharge to home or other facility with appropriate resources  Outcome: Progressing  Flowsheets  Taken 1/29/2024 2000 by Gilligan, Melissa, RN  Discharge to home or other facility with appropriate resources: Identify barriers to discharge with patient and caregiver  Taken 1/29/2024 0800 by Kristen Cottrell RN  Discharge to home or other facility with appropriate resources:   Identify barriers to discharge with patient and caregiver   Arrange for needed discharge resources and transportation as appropriate   Identify discharge learning needs (meds, wound care, etc)   Refer to discharge planning if patient needs post-hospital services based on physician order or complex needs related to functional status, cognitive ability or social support system     Problem: Skin/Tissue Integrity  Goal: Absence of new skin breakdown  Description: 1.  Monitor for areas of redness and/or skin breakdown  2.  Assess vascular access sites hourly  3.  Every 4-6 hours minimum:  Change oxygen saturation probe site  4.  Every 4-6 hours:  If on nasal continuous positive airway pressure, respiratory therapy assess nares and determine need for appliance change or resting period.  Outcome: Progressing     Problem: Safety - Adult  Goal: Free from fall injury  Outcome: Progressing  Flowsheets (Taken 1/29/2024 2135)  Free From Fall Injury: Instruct family/caregiver on patient safety     Problem: ABCDS Injury Assessment  Goal: Absence of physical injury  Outcome: Progressing  Flowsheets (Taken 1/29/2024 0800 by Kristen Cottrell RN)  Absence of Physical Injury: Implement safety measures based on patient assessment     Problem: Confusion  Goal: Confusion, delirium, dementia, or psychosis is improved or at baseline  Description: INTERVENTIONS:  1. Assess for possible contributors to thought disturbance, including medications, impaired vision or hearing, underlying metabolic abnormalities, dehydration, psychiatric 
  Problem: Discharge Planning  Goal: Discharge to home or other facility with appropriate resources  Outcome: Progressing  Flowsheets (Taken 1/10/2024 0408)  Discharge to home or other facility with appropriate resources: Identify barriers to discharge with patient and caregiver     Problem: Skin/Tissue Integrity  Goal: Absence of new skin breakdown  Description: 1.  Monitor for areas of redness and/or skin breakdown  2.  Assess vascular access sites hourly  3.  Every 4-6 hours minimum:  Change oxygen saturation probe site  4.  Every 4-6 hours:  If on nasal continuous positive airway pressure, respiratory therapy assess nares and determine need for appliance change or resting period.  Outcome: Progressing     Problem: Safety - Adult  Goal: Free from fall injury  Outcome: Progressing  Flowsheets (Taken 1/8/2024 2000 by Gilligan, Melissa, RN)  Free From Fall Injury: Instruct family/caregiver on patient safety     Problem: ABCDS Injury Assessment  Goal: Absence of physical injury  Outcome: Progressing  Flowsheets (Taken 1/10/2024 0408)  Absence of Physical Injury: Implement safety measures based on patient assessment   Care plan reviewed with patient.  Patient verbalizes understanding of the care plan and contributed to goal setting.   
  Problem: Discharge Planning  Goal: Discharge to home or other facility with appropriate resources  Outcome: Progressing  Flowsheets (Taken 1/10/2024 0800 by Elena Davidson, RN)  Discharge to home or other facility with appropriate resources: Identify barriers to discharge with patient and caregiver     Problem: Skin/Tissue Integrity  Goal: Absence of new skin breakdown  Description: 1.  Monitor for areas of redness and/or skin breakdown  2.  Assess vascular access sites hourly  3.  Every 4-6 hours minimum:  Change oxygen saturation probe site  4.  Every 4-6 hours:  If on nasal continuous positive airway pressure, respiratory therapy assess nares and determine need for appliance change or resting period.  Outcome: Progressing     Problem: ABCDS Injury Assessment  Goal: Absence of physical injury  Outcome: Progressing  Flowsheets (Taken 1/10/2024 0408)  Absence of Physical Injury: Implement safety measures based on patient assessment     Problem: Confusion  Goal: Confusion, delirium, dementia, or psychosis is improved or at baseline  Description: INTERVENTIONS:  1. Assess for possible contributors to thought disturbance, including medications, impaired vision or hearing, underlying metabolic abnormalities, dehydration, psychiatric diagnoses, and notify attending LIP  2. [place] high risk fall precautions, as indicated  3. Provide frequent short contacts to provide reality reorientation, refocusing and direction  4. Decrease environmental stimuli, including noise as appropriate  5. Monitor and intervene to maintain adequate nutrition, hydration, elimination, sleep and activity  6. If unable to ensure safety without constant attention obtain sitter and review sitter guidelines with assigned personnel  7. Initiate Psychosocial CNS and Spiritual Care consult, as indicated  Outcome: Progressing  Flowsheets  Taken 1/10/2024 2000 by Charleen Ramirez, RN  Effect of thought disturbance (confusion, delirium, dementia, or 
  Problem: Discharge Planning  Goal: Discharge to home or other facility with appropriate resources  Outcome: Progressing  Flowsheets (Taken 1/11/2024 2110 by Belinda Gómez, RN)  Discharge to home or other facility with appropriate resources: Identify barriers to discharge with patient and caregiver     Problem: Skin/Tissue Integrity  Goal: Absence of new skin breakdown  Description: 1.  Monitor for areas of redness and/or skin breakdown  2.  Assess vascular access sites hourly  3.  Every 4-6 hours minimum:  Change oxygen saturation probe site  4.  Every 4-6 hours:  If on nasal continuous positive airway pressure, respiratory therapy assess nares and determine need for appliance change or resting period.  Outcome: Progressing     Problem: Safety - Adult  Goal: Free from fall injury  Outcome: Progressing     Problem: ABCDS Injury Assessment  Goal: Absence of physical injury  Outcome: Progressing     Problem: Confusion  Goal: Confusion, delirium, dementia, or psychosis is improved or at baseline  Description: INTERVENTIONS:  1. Assess for possible contributors to thought disturbance, including medications, impaired vision or hearing, underlying metabolic abnormalities, dehydration, psychiatric diagnoses, and notify attending LIP  2. [place] high risk fall precautions, as indicated  3. Provide frequent short contacts to provide reality reorientation, refocusing and direction  4. Decrease environmental stimuli, including noise as appropriate  5. Monitor and intervene to maintain adequate nutrition, hydration, elimination, sleep and activity  6. If unable to ensure safety without constant attention obtain sitter and review sitter guidelines with assigned personnel  7. Initiate Psychosocial CNS and Spiritual Care consult, as indicated  Outcome: Progressing  Flowsheets (Taken 1/11/2024 2110 by Belinda Gómez, RN)  Effect of thought disturbance (confusion, delirium, dementia, or psychosis) are 
  Problem: Discharge Planning  Goal: Discharge to home or other facility with appropriate resources  Outcome: Progressing  Flowsheets (Taken 1/13/2024 2040)  Discharge to home or other facility with appropriate resources: Identify barriers to discharge with patient and caregiver     
  Problem: Discharge Planning  Goal: Discharge to home or other facility with appropriate resources  Outcome: Progressing  Flowsheets (Taken 1/14/2024 2000)  Discharge to home or other facility with appropriate resources:   Identify barriers to discharge with patient and caregiver   Arrange for needed discharge resources and transportation as appropriate   Identify discharge learning needs (meds, wound care, etc)   Refer to discharge planning if patient needs post-hospital services based on physician order or complex needs related to functional status, cognitive ability or social support system     Problem: Skin/Tissue Integrity  Goal: Absence of new skin breakdown  Description: 1.  Monitor for areas of redness and/or skin breakdown  2.  Assess vascular access sites hourly  3.  Every 4-6 hours minimum:  Change oxygen saturation probe site  4.  Every 4-6 hours:  If on nasal continuous positive airway pressure, respiratory therapy assess nares and determine need for appliance change or resting period.  Outcome: Progressing     Problem: Safety - Adult  Goal: Free from fall injury  Outcome: Progressing  Flowsheets (Taken 1/14/2024 2000)  Free From Fall Injury: Instruct family/caregiver on patient safety     Problem: ABCDS Injury Assessment  Goal: Absence of physical injury  Outcome: Progressing  Flowsheets (Taken 1/14/2024 2000)  Absence of Physical Injury: Implement safety measures based on patient assessment     Problem: Confusion  Goal: Confusion, delirium, dementia, or psychosis is improved or at baseline  Description: INTERVENTIONS:  1. Assess for possible contributors to thought disturbance, including medications, impaired vision or hearing, underlying metabolic abnormalities, dehydration, psychiatric diagnoses, and notify attending LIP  2. [place] high risk fall precautions, as indicated  3. Provide frequent short contacts to provide reality reorientation, refocusing and direction  4. Decrease environmental 
  Problem: Discharge Planning  Goal: Discharge to home or other facility with appropriate resources  Outcome: Progressing  Flowsheets (Taken 1/26/2024 0800)  Discharge to home or other facility with appropriate resources:   Identify barriers to discharge with patient and caregiver   Arrange for needed discharge resources and transportation as appropriate   Identify discharge learning needs (meds, wound care, etc)   Refer to discharge planning if patient needs post-hospital services based on physician order or complex needs related to functional status, cognitive ability or social support system     Problem: Skin/Tissue Integrity  Goal: Absence of new skin breakdown  Description: 1.  Monitor for areas of redness and/or skin breakdown  2.  Assess vascular access sites hourly  3.  Every 4-6 hours minimum:  Change oxygen saturation probe site  4.  Every 4-6 hours:  If on nasal continuous positive airway pressure, respiratory therapy assess nares and determine need for appliance change or resting period.  Outcome: Progressing     Problem: Safety - Adult  Goal: Free from fall injury  Outcome: Progressing     Problem: ABCDS Injury Assessment  Goal: Absence of physical injury  Outcome: Progressing     Problem: Confusion  Goal: Confusion, delirium, dementia, or psychosis is improved or at baseline  Description: INTERVENTIONS:  1. Assess for possible contributors to thought disturbance, including medications, impaired vision or hearing, underlying metabolic abnormalities, dehydration, psychiatric diagnoses, and notify attending LIP  2. [place] high risk fall precautions, as indicated  3. Provide frequent short contacts to provide reality reorientation, refocusing and direction  4. Decrease environmental stimuli, including noise as appropriate  5. Monitor and intervene to maintain adequate nutrition, hydration, elimination, sleep and activity  6. If unable to ensure safety without constant attention obtain sitter and review 
  Problem: Discharge Planning  Goal: Discharge to home or other facility with appropriate resources  Outcome: Progressing  Flowsheets (Taken 1/27/2024 0800)  Discharge to home or other facility with appropriate resources:   Identify barriers to discharge with patient and caregiver   Arrange for needed discharge resources and transportation as appropriate   Identify discharge learning needs (meds, wound care, etc)   Refer to discharge planning if patient needs post-hospital services based on physician order or complex needs related to functional status, cognitive ability or social support system     Problem: Skin/Tissue Integrity  Goal: Absence of new skin breakdown  Description: 1.  Monitor for areas of redness and/or skin breakdown  2.  Assess vascular access sites hourly  3.  Every 4-6 hours minimum:  Change oxygen saturation probe site  4.  Every 4-6 hours:  If on nasal continuous positive airway pressure, respiratory therapy assess nares and determine need for appliance change or resting period.  Outcome: Progressing     Problem: Safety - Adult  Goal: Free from fall injury  Outcome: Progressing     Problem: ABCDS Injury Assessment  Goal: Absence of physical injury  Outcome: Progressing     Problem: Confusion  Goal: Confusion, delirium, dementia, or psychosis is improved or at baseline  Description: INTERVENTIONS:  1. Assess for possible contributors to thought disturbance, including medications, impaired vision or hearing, underlying metabolic abnormalities, dehydration, psychiatric diagnoses, and notify attending LIP  2. [place] high risk fall precautions, as indicated  3. Provide frequent short contacts to provide reality reorientation, refocusing and direction  4. Decrease environmental stimuli, including noise as appropriate  5. Monitor and intervene to maintain adequate nutrition, hydration, elimination, sleep and activity  6. If unable to ensure safety without constant attention obtain sitter and review 
  Problem: Musculoskeletal - Adult  Goal: Return mobility to safest level of function  Outcome: Progressing     Problem: Musculoskeletal - Adult  Goal: Return ADL status to a safe level of function  Outcome: Progressing     Problem: Gastrointestinal - Adult  Goal: Minimal or absence of nausea and vomiting  Outcome: Progressing     Problem: Gastrointestinal - Adult  Goal: Maintains or returns to baseline bowel function  Outcome: Progressing     Problem: Gastrointestinal - Adult  Goal: Maintains adequate nutritional intake  Outcome: Progressing     Problem: Genitourinary - Adult  Goal: Absence of urinary retention  Outcome: Progressing     Problem: Genitourinary - Adult  Goal: Urinary catheter remains patent  Outcome: Progressing     Problem: Nutrition Deficit:  Goal: Optimize nutritional status  1/22/2024 1436 by Cammy Mckinney RN  Outcome: Progressing  1/22/2024 1130 by Andra Krishnan RD  Outcome: Not Progressing  Flowsheets (Taken 1/22/2024 1113)  Nutrient intake appropriate for improving, restoring, or maintaining nutritional needs:   Assess nutritional status and recommend course of action   Monitor oral intake, labs, and treatment plans   Recommend appropriate diets, oral nutritional supplements, and vitamin/mineral supplements     
1441 by Whitney Draper RN  Outcome: Progressing     Problem: ABCDS Injury Assessment  Goal: Absence of physical injury  1/26/2024 2211 by Rae Bernard RN  Outcome: Progressing  Flowsheets (Taken 1/26/2024 2000)  Absence of Physical Injury: Implement safety measures based on patient assessment  1/26/2024 1441 by Whitney Draper RN  Outcome: Progressing     Problem: Confusion  Goal: Confusion, delirium, dementia, or psychosis is improved or at baseline  Description: INTERVENTIONS:  1. Assess for possible contributors to thought disturbance, including medications, impaired vision or hearing, underlying metabolic abnormalities, dehydration, psychiatric diagnoses, and notify attending LIP  2. [place] high risk fall precautions, as indicated  3. Provide frequent short contacts to provide reality reorientation, refocusing and direction  4. Decrease environmental stimuli, including noise as appropriate  5. Monitor and intervene to maintain adequate nutrition, hydration, elimination, sleep and activity  6. If unable to ensure safety without constant attention obtain sitter and review sitter guidelines with assigned personnel  7. Initiate Psychosocial CNS and Spiritual Care consult, as indicated  1/26/2024 2211 by Rae Bernard RN  Outcome: Progressing  Flowsheets (Taken 1/26/2024 2000)  Effect of thought disturbance (confusion, delirium, dementia, or psychosis) are managed with adequate functional status: [place] high risk fall precautions, as indicated  1/26/2024 1441 by Whitney Draper RN  Outcome: Progressing  Flowsheets (Taken 1/26/2024 0800)  Effect of thought disturbance (confusion, delirium, dementia, or psychosis) are managed with adequate functional status:   Assess for contributors to thought disturbance, including medications, impaired vision or hearing, underlying metabolic abnormalities, dehydration, psychiatric diagnoses, notify LIP   [place] high risk fall precautions, as indicated   
patent  Outcome: Progressing     
stimuli, including noise as appropriate  5. Monitor and intervene to maintain adequate nutrition, hydration, elimination, sleep and activity  6. If unable to ensure safety without constant attention obtain sitter and review sitter guidelines with assigned personnel  7. Initiate Psychosocial CNS and Spiritual Care consult, as indicated  Outcome: Progressing     Problem: Pain  Goal: Verbalizes/displays adequate comfort level or baseline comfort level  Outcome: Progressing     Problem: Nutrition Deficit:  Goal: Optimize nutritional status  Outcome: Progressing     
stimuli, including noise as appropriate  5. Monitor and intervene to maintain adequate nutrition, hydration, elimination, sleep and activity  6. If unable to ensure safety without constant attention obtain sitter and review sitter guidelines with assigned personnel  7. Initiate Psychosocial CNS and Spiritual Care consult, as indicated  Outcome: Progressing  Flowsheets (Taken 1/18/2024 2000)  Effect of thought disturbance (confusion, delirium, dementia, or psychosis) are managed with adequate functional status:   [place] high risk fall precautions, as indicated   Decrease environmental stimuli, including noise as appropriate   If unable to ensure safety without constant attention obtain sitter and review sitter guidelines with assigned personnel     Problem: Pain  Goal: Verbalizes/displays adequate comfort level or baseline comfort level  Outcome: Progressing  Flowsheets (Taken 1/18/2024 1545 by Jenae Bob RN)  Verbalizes/displays adequate comfort level or baseline comfort level: Encourage patient to monitor pain and request assistance     Problem: Nutrition Deficit:  Goal: Optimize nutritional status  Outcome: Progressing     
Bernard, Rae, RN  Outcome: Progressing  1/27/2024 1501 by Whitney Draper RN  Outcome: Progressing     Problem: Musculoskeletal - Adult  Goal: Return mobility to safest level of function  1/27/2024 2256 by Rae Bernard RN  Outcome: Progressing  1/27/2024 1501 by Whitney Draper RN  Outcome: Progressing  Goal: Return ADL status to a safe level of function  1/27/2024 2256 by Rae Bernard RN  Outcome: Progressing  1/27/2024 1501 by Whitney Draper RN  Outcome: Progressing     Problem: Gastrointestinal - Adult  Goal: Minimal or absence of nausea and vomiting  1/27/2024 2256 by Rae Bernard RN  Outcome: Progressing  1/27/2024 1501 by Whitney Draper RN  Outcome: Progressing  Flowsheets (Taken 1/27/2024 0800)  Minimal or absence of nausea and vomiting: Administer ordered antiemetic medications as needed  Goal: Maintains or returns to baseline bowel function  1/27/2024 2256 by Rae Bernard RN  Outcome: Progressing  1/27/2024 1501 by Whitney Draper RN  Outcome: Progressing  Flowsheets (Taken 1/27/2024 0800)  Maintains or returns to baseline bowel function: Assess bowel function  Goal: Maintains adequate nutritional intake  1/27/2024 2256 by Rae Bernard RN  Outcome: Progressing  1/27/2024 1501 by Whitney Draper RN  Outcome: Progressing  Flowsheets (Taken 1/27/2024 0800)  Maintains adequate nutritional intake: Identify factors contributing to decreased intake, treat as appropriate     Problem: Genitourinary - Adult  Goal: Absence of urinary retention  1/27/2024 2256 by Rae Bernard RN  Outcome: Progressing  1/27/2024 1501 by Whitney Draper RN  Outcome: Progressing  Goal: Urinary catheter remains patent  1/27/2024 2256 by Rae Bernard RN  Outcome: Progressing  1/27/2024 1501 by Whitney Draper RN  Outcome: Progressing

## 2024-01-30 NOTE — CARE COORDINATION
1-30-Cm note: [facility] is scheduled to transport pt via stretcher to [facility] at 7PM, placed call to pt's  mother/Legal Guardian with transport time. RN and facility aware of transport time . Electronically signed by Gely Altamirano RN on 1/30/2024 at 1:42 PM]

## 2024-01-31 NOTE — PROGRESS NOTES
Nephrology Note  Stanley Weathers MD          Patient seen and examined.  No family member is present at bedside.  Sitter is present at the bedside.  Chart reviewed.  Patient remains lethargic.  NG tube in place draining greenish fluid.     In no acute distress.    Vital SignsBP 108/73   Pulse 91   Temp 98.9 °F (37.2 °C) (Axillary)   Resp (!) 31   Ht 1.626 m (5' 4\")   Wt 70.1 kg (154 lb 9.6 oz)   SpO2 91%   BMI 26.54 kg/m²   24HR INTAKE/OUTPUT:    Intake/Output Summary (Last 24 hours) at 1/14/2024 1322  Last data filed at 1/14/2024 0742  Gross per 24 hour   Intake 0 ml   Output 2300 ml   Net -2300 ml         PHYSICAL EXAM        Neck: No JVD  Lungs: Breath sounds decreased at the bases. No rales or ronchi.  Heart: Regular rate and rhythm. No S3 gallop. No  murmrur.  Abdomen: Soft non distended, non tender and normal bowel sounds.  Extremeties:   No edema.           Current Facility-Administered Medications   Medication Dose Route Frequency Provider Last Rate Last Admin    vancomycin (VANCOCIN) 1,000 mg in sodium chloride 0.9 % 250 mL IVPB ([id number])  1,000 mg IntraVENous Once Antony Deng MD        methylPREDNISolone sodium succ (SOLU-MEDROL) 40 mg in sterile water 1 mL injection  40 mg IntraVENous Daily Kristen Alexander APRN - CNP   40 mg at 01/14/24 0851    LORazepam (ATIVAN) injection 0.5 mg  0.5 mg IntraVENous Q4H PRN Michael Gallo DO        sodium chloride (Inhalant) 3 % nebulizer solution 4 mL  4 mL Nebulization BID Kristen Alexander APRN - CNP   4 mL at 01/14/24 0622    acetaminophen (TYLENOL) suppository 650 mg  650 mg Rectal Q6H PRN Michael Gallo DO   650 mg at 01/13/24 0828    ipratropium 0.5 mg-albuterol 2.5 mg (DUONEB) nebulizer solution 1 Dose  1 Dose Inhalation Q4H RT Jorge Luis Kohli DO   1 Dose at 01/14/24 0901    piperacillin-tazobactam (ZOSYN) 3,375 mg in sodium chloride 0.9 % 50 mL IVPB ([id number])  3,375 mg IntraVENous Q8H Michael Gallo DO   Stopped at 01/14/24 
        Nephrology Note  Stanley Weathers MD        1/15/24-patient with sitter in place.  Did not interact with me per sitter he states he has not been very interactive today.  He continues to have a NG tube in place.    Vital SignsBP 139/66   Pulse 66   Temp 98.4 °F (36.9 °C) (Axillary)   Resp 26   Ht 1.626 m (5' 4.02\")   Wt 69.6 kg (153 lb 6.4 oz)   SpO2 94%   BMI 26.32 kg/m²   24HR INTAKE/OUTPUT:    Intake/Output Summary (Last 24 hours) at 1/15/2024 1247  Last data filed at 1/15/2024 1101  Gross per 24 hour   Intake 120 ml   Output 2500 ml   Net -2380 ml         PHYSICAL EXAM        Neck: No JVD  Lungs: Breath sounds decreased at the bases. No rales or ronchi.  Heart: Regular rate and rhythm. No S3 gallop. No  murmrur.  Abdomen: Soft non distended, non tender and normal bowel sounds.  Extremeties:   No edema.           Current Facility-Administered Medications   Medication Dose Route Frequency Provider Last Rate Last Admin    vancomycin (VANCOCIN) 1,250 mg in sodium chloride 0.9 % 250 mL IVPB  1,250 mg IntraVENous Once Antony Deng MD        dextrose 5 % solution   IntraVENous Continuous Stanley Weathers MD 80 mL/hr at 01/15/24 0439 New Bag at 01/15/24 0439    methylPREDNISolone sodium succ (SOLU-MEDROL) 40 mg in sterile water 1 mL injection  40 mg IntraVENous Daily Kristen Alexander APRN - CNP   40 mg at 01/15/24 0828    LORazepam (ATIVAN) injection 0.5 mg  0.5 mg IntraVENous Q4H PRN Michael Gallo DO   0.5 mg at 01/15/24 0346    sodium chloride (Inhalant) 3 % nebulizer solution 4 mL  4 mL Nebulization BID Kristen Alexander APRN - CNP   4 mL at 01/15/24 0648    acetaminophen (TYLENOL) suppository 650 mg  650 mg Rectal Q6H PRN Michael Gallo DO   650 mg at 01/13/24 0828    ipratropium 0.5 mg-albuterol 2.5 mg (DUONEB) nebulizer solution 1 Dose  1 Dose Inhalation Q4H RT Jorge Luis Kohli DO   1 Dose at 01/15/24 1017    piperacillin-tazobactam (ZOSYN) 3,375 mg in sodium chloride 0.9 % 50 mL IVPB 
        Nephrology Note  Stanley Weathers MD      1/19/24-patient seen and examined.  He continues to have sitter at the bedside.  There is continuing drainage from the NG tube.        Vital SignsBP 107/82   Pulse 83   Temp (!) 96.3 °F (35.7 °C) (Axillary)   Resp 20   Ht 1.626 m (5' 4.02\")   Wt 71.2 kg (157 lb 1 oz)   SpO2 94%   BMI 26.95 kg/m²   24HR INTAKE/OUTPUT:    Intake/Output Summary (Last 24 hours) at 1/18/2024 1422  Last data filed at 1/18/2024 0946  Gross per 24 hour   Intake 2610.06 ml   Output 2725 ml   Net -114.94 ml         PHYSICAL EXAM        Neck: No JVD  Lungs: Diminished in the bases  Heart: S1-S2 no S3 or rub  Abdomen: Soft distended  Extremeties:   No edema.           Current Facility-Administered Medications   Medication Dose Route Frequency Provider Last Rate Last Admin    metoclopramide (REGLAN) injection 10 mg  10 mg IntraVENous Q6H Venancio Wisdom MD   10 mg at 01/18/24 0934    ipratropium 0.5 mg-albuterol 2.5 mg (DUONEB) nebulizer solution 1 Dose  1 Dose Inhalation Q4H WA RT Michael Gallo, DO   1 Dose at 01/18/24 1325    vancomycin (VANCOCIN) 1,250 mg in sodium chloride 0.9 % 250 mL IVPB  1,250 mg IntraVENous Q18H Antony Deng MD   Stopped at 01/18/24 1114    clonazePAM (KlonoPIN) disintegrating tablet 0.5 mg  0.5 mg Per NG tube QAM Mnoster Gallolas A, DO   0.5 mg at 01/18/24 0938    clonazePAM (KlonoPIN) disintegrating tablet 1.5 mg  1.5 mg Per NG tube Nightly Monster Gallolas A, DO   1.5 mg at 01/17/24 2124    clonazePAM (KlonoPIN) disintegrating tablet 0.25 mg  0.25 mg Per NG tube Daily Monster Gallolas A, DO   0.25 mg at 01/17/24 1415    dextrose 5 % solution   IntraVENous Continuous Stanley Weathers  mL/hr at 01/18/24 0929 Rate Verify at 01/18/24 0929    methylPREDNISolone sodium succ (SOLU-MEDROL) 40 mg in sterile water 1 mL injection  40 mg IntraVENous Daily Kristen Alexander, ADALBERTO - CNP   40 mg at 01/18/24 0933    LORazepam (ATIVAN) injection 0.5 mg  0.5 mg 
     Pulmonary Critical Care Medicine           PULMONARY  CRITICAL CARE   SERVICE DAILY PROGRESS  NOTE     1/15/2024   Hospital LOS:  LOS: 7 days     Impression /Recommendations:    Acute respiratory insufficiency likely secondary to influenza A LRTI with probable superimposed bacterial pneumonia.  Encephalopathy - Etiology? At baseline - interactive   Significant atelectasis and bibasilar pulmonary parenchyma with considerable volume loss  Probable acute on chronic aspiration pneumonia/pneumonitis  Acute GI bleeding - large amount of coffee-ground secretions from the NG tube    -Continue serial H&H follow-up  -Chest physiotherapy  -Continue and wean supplemental oxygen as tolerated currently on 5 L nasal cannula, goal saturation more than 90%  -Continue hypertonic saline  -GI following will need endoscopy/EGD, this is held secondary to respiratory status at this time  -Continue Zosyn  -Aspiration precautions      Interval History/Event Changes:  Nonverbal at baseline  Agitated today  Continues to poor large amount of coffee-ground secretions from the NG tube      Allergies  Not on File    Review of Systems    A pertinent review of systems was performed and was otherwise non-contributory.    Vitals-     BP (!) 129/58   Pulse 73   Temp 98.1 °F (36.7 °C) (Infrared)   Resp 30   Ht 1.626 m (5' 4.02\")   Wt 69.6 kg (153 lb 6.4 oz)   SpO2 96%   BMI 26.32 kg/m²    Tmax: Temp (24hrs), Av.5 °F (36.9 °C), Min:97.9 °F (36.6 °C), Max:98.9 °F (37.2 °C)      Hemodynamics:  Cuff:   Systolic (24hrs), Av , Min:110 , Max:139   /Diastolic (24hrs), Av, Min:58, Max:74    Cuff MAP:MAP (mmHg)  Av.4  Min: 76  Max: 108  P:   Pulse  Av.1  Min: 66  Max: 91      Airway:     Observed RR: Resp  Av  Min: 22  Max: 32   Observed O2 sats: SpO2  Av.2 %  Min: 85 %  Max: 99 %      Intake/Output Summary (Last 24 hours) at 1/15/2024 1554  Last data filed at 1/15/2024 1247  Gross per 24 hour   Intake 60 ml   Output 
     Pulmonary Critical Care Medicine         PULMONARY  CRITICAL CARE   SERVICE DAILY PROGRESS  NOTE     2024   Hospital LOS:  LOS: 11 days     Impression /Recommendations:    Acute respiratory insufficiency likely secondary to influenza A LRTI with probable superimposed bacterial pneumonia.  Encephalopathy - Etiology? At baseline - interactive   Significant atelectasis and bibasilar pulmonary parenchyma with considerable volume loss  Probable acute on chronic aspiration pneumonia/pneumonitis  ? Acute GI bleeding - large amount of coffee-ground secretions from the NG tube,  Hct has been stable     -Continue serial H&H follow-up, hemoglobin stable at 14.4, GI service will take him for upper endoscopy today.  -Continue Chest physiotherapy  - Sitter at the bedside   -Continue and wean supplemental oxygen as tolerated currently on 8 L nasal cannula, goal saturation more than 90%  -Continue hypertonic saline  -Coffee-ground gastric contents being suctioned with NG tube, GI following - will need endoscopy/EGD, this is held secondary to respiratory status at this time  -Continue Zosyn  -Continue Aspiration precautions    Interval History/Event Changes:    Does not communicate or interact  Currently comfortably lying, not agitated today    Oxygenation adequate but continues to be on 5 to 8 LPM NC O2     For EGD today    Allergies  No Known Allergies    Review of Systems    A pertinent review of systems was performed and was otherwise non-contributory.    Vitals-     /75   Pulse 85   Temp 98 °F (36.7 °C) (Infrared)   Resp 22   Ht 1.626 m (5' 4.02\")   Wt 70.1 kg (154 lb 9.6 oz)   SpO2 99%   BMI 26.52 kg/m²    Tmax: Temp (24hrs), Av.6 °F (36.4 °C), Min:97.1 °F (36.2 °C), Max:98 °F (36.7 °C)      Hemodynamics:  Cuff:   Systolic (24hrs), Av , Min:97 , Max:130   /Diastolic (24hrs), Av, Min:60, Max:75    Cuff MAP:MAP (mmHg)  Av.5  Min: 72  Max: 108  P:   Pulse  Av.9  Min: 68  Max: 
     Pulmonary Critical Care Medicine         PULMONARY  CRITICAL CARE   SERVICE DAILY PROGRESS  NOTE     2024   Hospital LOS:  LOS: 8 days     Impression /Recommendations:    Acute respiratory insufficiency likely secondary to influenza A LRTI with probable superimposed bacterial pneumonia.  Encephalopathy - Etiology? At baseline - interactive   Significant atelectasis and bibasilar pulmonary parenchyma with considerable volume loss  Probable acute on chronic aspiration pneumonia/pneumonitis  Acute GI bleeding - large amount of coffee-ground secretions from the NG tube    -Continue serial H&H follow-up  -Continue Chest physiotherapy  - Routine tracheostomy care   - Sitter at the bedside   -Continue and wean supplemental oxygen as tolerated currently on 5 L nasal cannula, goal saturation more than 90%  -Continue hypertonic saline  -GI following will need endoscopy/EGD, this is held secondary to respiratory status at this time  -Continue Zosyn  -Continue Aspiration precautions      Interval History/Event Changes:  Nonverbal at baseline  Calm and less agitated today  Coffee ground emesis from NG tube suction persists     Allergies  Not on File    Review of Systems    A pertinent review of systems was performed and was otherwise non-contributory.    Vitals-     /66   Pulse 74   Temp 98.9 °F (37.2 °C) (Axillary)   Resp 16   Ht 1.626 m (5' 4.02\")   Wt 69.9 kg (154 lb 1.6 oz)   SpO2 98%   BMI 26.44 kg/m²    Tmax: Temp (24hrs), Av.5 °F (36.9 °C), Min:98.2 °F (36.8 °C), Max:98.9 °F (37.2 °C)      Hemodynamics:  Cuff:   Systolic (24hrs), Av , Min:118 , Max:159   /Diastolic (24hrs), Av, Min:55, Max:72    Cuff MAP:MAP (mmHg)  Av.4  Min: 76  Max: 108  P:   Pulse  Av.9  Min: 64  Max: 88      Airway:     Observed RR: Resp  Av.7  Min: 16  Max: 30   Observed O2 sats: SpO2  Av.2 %  Min: 85 %  Max: 99 %      Intake/Output Summary (Last 24 hours) at 2024 3818  Last data filed at 
     Pulmonary Critical Care Medicine         PULMONARY  CRITICAL CARE   SERVICE DAILY PROGRESS  NOTE     2024   Hospital LOS:  LOS: 9 days     Impression /Recommendations:    Acute respiratory insufficiency likely secondary to influenza A LRTI with probable superimposed bacterial pneumonia.  Encephalopathy - Etiology? At baseline - interactive   Significant atelectasis and bibasilar pulmonary parenchyma with considerable volume loss  Probable acute on chronic aspiration pneumonia/pneumonitis  Acute GI bleeding - large amount of coffee-ground secretions from the NG tube    -Continue serial H&H follow-up, hemoglobin stable at 13  -Continue Chest physiotherapy  - Sitter at the bedside   -Continue and wean supplemental oxygen as tolerated currently on 5 L nasal cannula oxygen, goal saturation more than 90%  -Continue hypertonic saline  -Coffee-ground gastric contents being suctioned with NG tube, GI following - will need endoscopy/EGD, this is held secondary to respiratory status at this time  -Continue Zosyn  -Continue Aspiration precautions      Interval History/Event Changes:    Not responsive, not interactive  About 300 cc of coffee-ground drainage from NG tube suction  Sitter at the bedside, patient in no acute distress    Allergies  Not on File    Review of Systems    A pertinent review of systems was performed and was otherwise non-contributory.    Vitals-     /61   Pulse 77   Temp 98.5 °F (36.9 °C) (Axillary)   Resp 20   Ht 1.626 m (5' 4.02\")   Wt 71.2 kg (157 lb 1 oz)   SpO2 96%   BMI 26.95 kg/m²    Tmax: Temp (24hrs), Av.3 °F (36.8 °C), Min:97.1 °F (36.2 °C), Max:99.4 °F (37.4 °C)      Hemodynamics:  Cuff:   Systolic (24hrs), Av , Min:109 , Max:153   /Diastolic (24hrs), Av, Min:46, Max:97    Cuff MAP:MAP (mmHg)  Av.4  Min: 76  Max: 108  P:   Pulse  Av.3  Min: 70  Max: 91      Airway:     Observed RR: Resp  Av.4  Min: 20  Max: 24   Observed O2 sats: SpO2  Av.2 
    [facility]  Department of Internal Medicine  Division of Pulmonary, Critical Care and Sleep Medicine  Progress Note    Shay Villatoro DO, MPH, FCCP, FACOI, FACP  Brooklyn Sofia DO, [facility]  MD Zohaib Alvarado MD David Weiner, MD Bridget Nance, APRN    SUBJECTIVE :  We are following Mr. Aureliano Falcon for worsening hypoxic respiratory failure. EGD on hold till respiratory requirements improve. The patient remains on 5L NC with oxygen saturation of 93%. He is minimally responsive. NG tube with green drainage, ABD less firm and distended, reported to having a large BM this am which was FOBT positive.    OBJECTIVE:     PHYSICAL EXAM:   VITALS:   Vitals:    01/13/24 2300 01/14/24 0115 01/14/24 0223 01/14/24 0400   BP:  116/64  108/73   Pulse:  96  91   Resp:  (!) 32  (!) 31   Temp:  99.3 °F (37.4 °C)  98.9 °F (37.2 °C)   TempSrc:  Axillary  Axillary   SpO2: 95% 94% 96% 91%   Weight:       Height:            Intake/Output Summary (Last 24 hours) at 1/14/2024 0832  Last data filed at 1/14/2024 0620  Gross per 24 hour   Intake 10 ml   Output 2300 ml   Net -2290 ml          CONSTITUTIONAL:   Lethargic, combative at times, minimally responsive   SKIN:     No rash, No suspicious lesions, No skin discoloration  HEENT:     EOMI, MMM, No thrush, NG tube present  NECK:    No bruits, No JVP appreciated  CV:      Sinus,  No murmur, No rubs, No gallops  PULMONARY:   Diminished BS,  No Wheezing, No Rales, No Rhonchi      No noted egophony  ABDOMEN:     Firm, rounded, distended, hypoactive bowel sounds   EXT:    No deformities .  No clubbing.       No lower extremity edema, No venous stasis  PULSE:   Appears equal and palpable.  PSYCHIATRIC:  Seems appropriate, No acute psychosis  MS:    No fractures, No gross weakness  NEUROLOGIC:   The clinical assessment is non-focal     DATA: IMAGING & TESTING:     LABORATORY TESTS:    CBC with Differential:    Lab Results   Component 
    OCCUPATIONAL THERAPY BEDSIDE TREATMENT NOTE  [facility]  [address] [place]    Date:2024  Patient Name: Aureliano Falcon \"Edy\"  MRN: [id number]  : 1955  Room: [facility]/0631-01         Evaluating OT: Genevieve Pino OTR/L #[id number]      Referring Provider and Specific Provider Orders/Date:      24 1000   OT eval and treat  Start:  24 1000,   End:  24 1000,   ONE TIME,   Standing Count:  1 Occurrences,   R         Michael Gallo,        Placement Recommendation: Subacute Rehab         Diagnosis:   1. Acute metabolic encephalopathy    2. Influenza A    3. Urinary tract infection without hematuria, site unspecified         Surgery: None        Pertinent Medical History:       Past Medical History        Past Medical History:   Diagnosis Date    Smoker 2024     4 cartons per month            Past Surgical History   History reviewed. No pertinent surgical history.         Precautions:  Fall Risk, alarm, droplet precautions, influenza, non-verbal per chart however patient responding to \"yes/no\" questions and mumbling, hx of nervous breakdown and subsequent schizophrenic diagnosis at end  (per chart/family report)       Assessment of current deficits    [x] Functional mobility            [x]ADLs           [x] Strength                   [x]Cognition    [x] Functional transfers          [x] IADLs          [x] Safety Awareness   [x]Endurance    [] Fine Coordination              [x] Balance      [] Vision/perception    []Sensation      []Gross Motor Coordination  [] ROM           [] Delirium                   [] Motor Control      OT PLAN OF CARE   OT POC based on physician orders, patient diagnosis and results of clinical assessment     Frequency/Duration 1-3 days/wk for 2 weeks PRN      Specific OT Treatment Interventions to include:   * Instruction/training on adapted ADL techniques and AE recommendations to increase 
    OCCUPATIONAL THERAPY BEDSIDE TREATMENT NOTE  [facility]  [address] [place]    Date:1/15/2024  Patient Name: Aureliano Falcon \"Edy\"  MRN: [id number]  : 1955  Room: [facility]/0631-01         Evaluating OT: Genevieve Pino OTR/L #[id number]      Referring Provider and Specific Provider Orders/Date:      24 1000   OT eval and treat  Start:  24 1000,   End:  24 1000,   ONE TIME,   Standing Count:  1 Occurrences,   R         Michael Gallo,        Placement Recommendation: Subacute Rehab         Diagnosis:   1. Acute metabolic encephalopathy    2. Influenza A    3. Urinary tract infection without hematuria, site unspecified         Surgery: None        Pertinent Medical History:       Past Medical History        Past Medical History:   Diagnosis Date    Smoker 2024     4 cartons per month            Past Surgical History   History reviewed. No pertinent surgical history.         Precautions:  Fall Risk, alarm, droplet precautions, influenza, non-verbal per chart however patient responding to \"yes/no\" questions and mumbling, hx of nervous breakdown and subsequent schizophrenic diagnosis at end  (per chart/family report)       Assessment of current deficits    [x] Functional mobility            [x]ADLs           [x] Strength                   [x]Cognition    [x] Functional transfers          [x] IADLs          [x] Safety Awareness   [x]Endurance    [] Fine Coordination              [x] Balance      [] Vision/perception    []Sensation      []Gross Motor Coordination  [] ROM           [] Delirium                   [] Motor Control      OT PLAN OF CARE   OT POC based on physician orders, patient diagnosis and results of clinical assessment     Frequency/Duration 1-3 days/wk for 2 weeks PRN      Specific OT Treatment Interventions to include:   * Instruction/training on adapted ADL techniques and AE recommendations to increase 
  [facility]   Department of Pulmonary, Critical Care and Sleep Medicine  Department of Internal Medicine  Progress Note    Shay GLOVER      Patients primary pulmonologist is: None    SUBJECTIVE: Patient seen and examined at bedside.  Patient is awake but does not follow commands.He has no fever , chills, or rigors.  He has been eating intermittently.  Oral intake has improved and patient had 100% of his breakfast this morning.  He has been weaned off supplemental oxygen and currently on room air.       OBJECTIVE:  Vitals:    01/27/24 0627 01/27/24 0745 01/27/24 1030 01/27/24 1145   BP:  131/67 130/66 100/72   Pulse:  96 95 (!) 101   Resp:  18 18 18   Temp:  97.9 °F (36.6 °C) 98.3 °F (36.8 °C) 98.3 °F (36.8 °C)   TempSrc:  Axillary Oral Axillary   SpO2: 100% 93% 95% 95%   Weight:       Height:         Constitutional: Alert,     EENT: EOMI TRACEE.  Mucous membranes dry  Neck:  Trachea was midline.   Respiratory: CTA bilaterally, no use of accessory muscles  Cardiovascular: Regular, No murmur. No rubs.      Pulses:  Equal bilaterally.    Abdomen: Soft without organomegaly. No rebound, rigidity.  No guarding.  Lymphatic: No lymphadenopathy.  Musculoskeletal: Without weakness or gross deficits  Extremities:  No lower extremity edema. Reflexes appear adequate.   Skin:  Warm and dry.  No skin rashes.   Neurological/Psychiatric: Patient opens eyes to voice.  He does say a few words.  While he was noted to move all 4 extremities he was unable to follow simple commands such as giving a thumbs up or squeezing my hand.      DATA:    RADIOLOGY: No new chest imaging today      CBC with Differential:    Lab Results   Component Value Date/Time    WBC 12.1 01/27/2024 07:05 AM    RBC 4.37 01/27/2024 07:05 AM    HGB 13.1 01/27/2024 07:05 AM    HCT 41.2 01/27/2024 07:05 AM    
  [facility]   Department of Pulmonary, Critical Care and Sleep Medicine  Department of Internal Medicine  Progress Note    Shay GLOVER      Patients primary pulmonologist is: None    SUBJECTIVE: Patient seen and examined at bedside.  Patient is awake but does not follow commands.He has no fever , chills, or rigors.  He has been eating intermittently.  Oral intake is poor.  He clearly is considered.  Patient's oxygen requirement has been weaned to 1 L/min       OBJECTIVE:  Vitals:    01/24/24 0315 01/24/24 0522 01/24/24 0709 01/24/24 1117   BP: 104/71  131/85 128/60   Pulse: 83  83 88   Resp: 22 20 18   Temp: 97.9 °F (36.6 °C)  97.4 °F (36.3 °C) 98.2 °F (36.8 °C)   TempSrc: Infrared  Oral Oral   SpO2: 100%  95% 98%   Weight:  67.4 kg (148 lb 8 oz)     Height:         Constitutional: Alert,     EENT: EOMI TRACEE.  Mucous membranes dry  Neck:  Trachea was midline.   Respiratory: CTA bilaterally, no use of accessory muscles  Cardiovascular: Regular, No murmur. No rubs.      Pulses:  Equal bilaterally.    Abdomen: Soft without organomegaly. No rebound, rigidity.  No guarding.  Lymphatic: No lymphadenopathy.  Musculoskeletal: Without weakness or gross deficits  Extremities:  No lower extremity edema. Reflexes appear adequate.   Skin:  Warm and dry.  No skin rashes.   Neurological/Psychiatric: Patient opens eyes to voice.  He does say a few words.  While he was noted to move all 4 extremities he was unable to follow simple commands such as giving a thumbs up or squeezing my hand.      DATA:    RADIOLOGY: No new chest imaging today      CBC with Differential:    Lab Results   Component Value Date/Time    WBC 14.0 01/24/2024 07:21 AM    RBC 4.14 01/24/2024 07:21 AM    HGB 12.5 01/24/2024 07:21 AM    HCT 39.0 01/24/2024 07:21 AM     01/24/2024 07:21 AM    MC[facility] 
  Physician Progress Note      PATIENT:               KAREN MENDOSA  CSN #:                  [id number]  :                       1955  ADMIT DATE:       2024 1:43 AM  DISCH DATE:  RESPONDING  PROVIDER #:        Michael Gallo DO          QUERY TEXT:    Pt admitted with Influenza, UTI. Pt noted to have elevated hr, rr, temp,   lactic, procalcitonin. If possible, please document in the progress notes and   discharge summary if you are evaluating and /or treating any of the following:    The medical record reflects the following:  Risk Factors: Influenza A+, UTI, BREN, acute resp failure, DM,  Clinical Indicators: : Lactic acid 4.8, Procal 0.16, Ph 7.48, Sed 38, Crp   67, UC no growth, .9-99-28, Cxr bibasilar atelecatsis, CT A/P 1.Fluid   distension of small bowel loops and colon indicating diarrheal   illness....2.Mild mural thickening in the distal esophagus likely indicating   acute esophagitis. 3.Bladder wall thickening, Clinical correlation for   evidence of cystitis is recommended...6.Bibasilar pulmonary opacities which   may represent atelectasis or pneumonia.  Treatment: IVF 2L bolus, Rocephin, Vibramycin.    Thank you, Marce Mathew RN CDS  [phone]  Options provided:  -- Sepsis, present on admission  -- Sepsis, developed following admission  -- Influenza, UTI without Sepsis  -- Other - I will add my own diagnosis  -- Disagree - Not applicable / Not valid  -- Disagree - Clinically unable to determine / Unknown  -- Refer to Clinical Documentation Reviewer    PROVIDER RESPONSE TEXT:    This patient has sepsis which was present on admission.    Query created by: Marce Mathew on 2024 8:55 AM      Electronically signed by:  Michael Gallo DO 2024 4:44 PM          
  SPEECH/LANGUAGE PATHOLOGY  CLINICAL ASSESSMENT OF SWALLOWING FUNCTION   and PLAN OF CARE    PATIENT NAME:  Aureliano Falcon  (male)     MRN:  [id number]    :  1955  (68 y.o.)  STATUS:  Inpatient: Room 0631    TODAY'S DATE:  1/10/2024  REFERRING PROVIDER:      SLP eval and treat  Start:  01/10/24 1015,   End:  01/10/24 1015,   ONE TIME,   Standing Count:  1 Occurrences,   R       Michael Gallo,   REASON FOR REFERRAL: dysphagia    EVALUATING THERAPIST: GREG Coulter                 RESULTS:    DYSPHAGIA DIAGNOSIS:   Clinical indicators of moderate-severe oropharyngeal phase dysphagia --very lethargic on exam would open eyes briefly     May benefit from GI consult due to persistent belching even without any PO and reported emesis with meds/thin liquids       DIET RECOMMENDATIONS:  NPO with ongoing PO analysis by SLP only to determine if PO diet can be initiated         FEEDING RECOMMENDATIONS:     Assistance level:  Not applicable      Compensatory strategies recommended: Not applicable      Discussed recommendations with nursing and/or faxed report to referring provider: Yes    SPEECH THERAPY  PLAN OF CARE   The dysphagia POC is established based on physician order, dysphagia diagnosis and results of clinical assessment     Skilled SLP intervention for dysphagia management on acute care up to 5 x per week until goals met, pt plateaus in function and/or discharged from hospital    Conditions Requiring Skilled Therapeutic Intervention for dysphagia:    Patient is performing below functional baseline d/t  current acute condition, respiratory compromise, multiple medications, and/or increased dependency upon caregivers. Impaired alertness  Persistent belching and emesis with intake of meds with thin     Specific dysphagia interventions to include:     ongoing evaluation of swallow function to determine when PO diet can be safely initiated    Specific instructions for next treatment:  ongoing skilled PO 
  [facility]   Department of Pulmonary, Critical Care and Sleep Medicine  Department of Internal Medicine  Progress Note    Shay GLOVER      Patients primary pulmonologist is: None    SUBJECTIVE: Patient seen and examined at bedside.  He did open his eyes for me.  When I asked him to give me a thumbs up or squeeze my hand he moved his fingers but was unable to actually follow the commands.  He is currently receiving parenteral nutrition through a peripheral IV in his right hand.  He remains on 3 L nasal cannula.         OBJECTIVE:  Vitals:    01/20/24 1808 01/21/24 0415 01/21/24 0546 01/21/24 0830   BP:  (!) 115/56  (!) 107/56   Pulse:  70  79   Resp:  18  16   Temp:  97.7 °F (36.5 °C)  98.3 °F (36.8 °C)   TempSrc:  Axillary     SpO2: 93% 92%  91%   Weight:   70.4 kg (155 lb 4.8 oz)    Height:         Constitutional: Alert,     EENT: EOMI TRACEE.  Mucous membranes dry  Neck:  Trachea was midline.   Respiratory: CTA bilaterally, no use of accessory muscles  Cardiovascular: Regular, No murmur. No rubs.      Pulses:  Equal bilaterally.    Abdomen: Soft without organomegaly. No rebound, rigidity.  No guarding.  Lymphatic: No lymphadenopathy.  Musculoskeletal: Without weakness or gross deficits  Extremities:  No lower extremity edema. Reflexes appear adequate.   Skin:  Warm and dry.  No skin rashes.   Neurological/Psychiatric: Patient opens eyes to voice.  He does say a few words.  While he was noted to move all 4 extremities he was unable to follow simple commands such as giving a thumbs up or squeezing my hand.      DATA:    RADIOLOGY: No new chest imaging today      CBC with Differential:    Lab Results   Component Value Date/Time    WBC 13.0 01/21/2024 05:23 AM    RBC 3.78 01/21/2024 05:23 AM    HGB 11.7 01/21/2024 05:23 AM    HCT 34.8 01/21/2024 05:23 AM    
  [facility]   Department of Pulmonary, Critical Care and Sleep Medicine  Department of Internal Medicine  Progress Note    Shay GLOVER      Patients primary pulmonologist is: None    SUBJECTIVE: Patient seen and examined at bedside.  Patient is awake but does not follow commands.He has no fever , chills, or rigors.  He has been eating independently         OBJECTIVE:  Vitals:    01/22/24 0409 01/22/24 0745 01/22/24 1113 01/22/24 1159   BP: 135/71 (!) 122/56  131/61   Pulse: 88 81  97   Resp: 18 17  17   Temp: 98.4 °F (36.9 °C) 98 °F (36.7 °C)  98.2 °F (36.8 °C)   TempSrc: Oral Axillary  Axillary   SpO2: 92% 96%  97%   Weight:       Height:   1.626 m (5' 4.02\")      Constitutional: Alert,     EENT: EOMI TRACEE.  Mucous membranes dry  Neck:  Trachea was midline.   Respiratory: CTA bilaterally, no use of accessory muscles  Cardiovascular: Regular, No murmur. No rubs.      Pulses:  Equal bilaterally.    Abdomen: Soft without organomegaly. No rebound, rigidity.  No guarding.  Lymphatic: No lymphadenopathy.  Musculoskeletal: Without weakness or gross deficits  Extremities:  No lower extremity edema. Reflexes appear adequate.   Skin:  Warm and dry.  No skin rashes.   Neurological/Psychiatric: Patient opens eyes to voice.  He does say a few words.  While he was noted to move all 4 extremities he was unable to follow simple commands such as giving a thumbs up or squeezing my hand.      DATA:    RADIOLOGY: No new chest imaging today      CBC with Differential:    Lab Results   Component Value Date/Time    WBC 16.5 01/22/2024 06:14 AM    RBC 4.08 01/22/2024 06:14 AM    HGB 12.7 01/22/2024 06:14 AM    HCT 38.7 01/22/2024 06:14 AM     01/22/2024 06:14 AM    MCV 94.9 01/22/2024 06:14 AM    MCH 31.1 01/22/2024 06:14 AM    MCHC 32.8 01/22/2024 06:14 AM    RDW 13.3 
  [facility]   Department of Pulmonary, Critical Care and Sleep Medicine  Department of Internal Medicine  Progress Note    Shay GLOVER      Patients primary pulmonologist is: None    SUBJECTIVE: Patient seen and examined at bedside.  Patient is awake but does not follow commands.He has no fever , chills, or rigors.  He has been eating intermittently.  Oral intake is poor.  He clearly is considered.  Patient's oxygen requirement has been weaned to 1 L/min.  He had an uneventful night.       OBJECTIVE:  Vitals:    01/25/24 0509 01/25/24 0549 01/25/24 0800 01/25/24 1130   BP: 133/72  139/68 (!) 114/55   Pulse: (!) 102  100 92   Resp: 22  20 18   Temp: 98.7 °F (37.1 °C)  99.8 °F (37.7 °C) (!) 96 °F (35.6 °C)   TempSrc: Oral  Oral Axillary   SpO2: 92%  92% 91%   Weight:  66.6 kg (146 lb 12.8 oz)     Height:         Constitutional: Alert,     EENT: EOMI TRACEE.  Mucous membranes dry  Neck:  Trachea was midline.   Respiratory: CTA bilaterally, no use of accessory muscles  Cardiovascular: Regular, No murmur. No rubs.      Pulses:  Equal bilaterally.    Abdomen: Soft without organomegaly. No rebound, rigidity.  No guarding.  Lymphatic: No lymphadenopathy.  Musculoskeletal: Without weakness or gross deficits  Extremities:  No lower extremity edema. Reflexes appear adequate.   Skin:  Warm and dry.  No skin rashes.   Neurological/Psychiatric: Patient opens eyes to voice.  He does say a few words.  While he was noted to move all 4 extremities he was unable to follow simple commands such as giving a thumbs up or squeezing my hand.      DATA:    RADIOLOGY: No new chest imaging today      CBC with Differential:    Lab Results   Component Value Date/Time    WBC 14.0 01/24/2024 07:21 AM    RBC 4.14 01/24/2024 07:21 AM    HGB 12.5 01/24/2024 07:21 AM    HCT 39.0 01/24/2024 
  Speech Language Pathology  NAME:  Aureliano Falcon  :  1955  DATE: 1/15/2024  ROOM:  0631/0631-01    Pt unavailable  for Dysphagia therapy services due to:    HOLD per RN, Pt not appropriate due to mentation and NG to suction    Will re-attempt as appropriate. Thank you.     Becca Le MSCCC/SLP  Speech Language Pathologist  [id number]    
  Speech Language Pathology  NAME:  Aureliano Falcon  :  1955  DATE: 2024  ROOM:  0631/0631-01    Pt unavailable 24 for Dysphagia therapy services due to:    Patient NPO for procedure not able to address dysphagia goals due to this.  Also had NG tube to suction placed    Will re-attempt as appropriate. Thank you.     Becca Le MSCCC/SLP  Speech Language Pathologist  [id number]    
  Speech Language Pathology  NAME:  Aureliano Falcon  :  1955  DATE: 2024  ROOM:  0631/0631-01    Pt unavailable for Dysphagia therapy services due to:    Patient NPO for procedure not able to address dysphagia goals due to this. Still with suction NG and not cleared yet for PO.      Will re-attempt as appropriate. Thank you.     Becca Le MSCCC/SLP  Speech Language Pathologist  [id number]    
 Please notify [phone] on discharge.   
,    Home medication records reviewed for reconciliation with the following discrepancies requiring your attention:    Clozapine home dose 100mg Q AM and 200mg Q HS  clonAZepam home dose: 0.5mg Q AM, 0.25mg @ 1400 and 1.5mg Q HS for panic disorder.    Source/s of information: [facility] 1/8/2024.  Call pharmacy at [phone] with any questions.  Thank you.  Kayli Marie RP  1/8/2024, 1:33 PM      
1:1 removed, TSM initiated   
4 Eyes Skin Assessment     NAME:  Aureliano Falcon  YOB: 1955  MEDICAL RECORD NUMBER:  [id number]    The patient is being assessed for  Admission    I agree that at least one RN has performed a thorough Head to Toe Skin Assessment on the patient. ALL assessment sites listed below have been assessed.      Areas assessed by both nurses:    Head, Face, Ears, Shoulders, Back, Chest, Arms, Elbows, Hands, Sacrum. Buttock, Coccyx, Ischium, Legs. Feet and Heels, and Under Medical Devices         Does the Patient have a Wound? No noted wound(s)       Isidro Prevention initiated by RN: Yes  Wound Care Orders initiated by RN: No    Pressure Injury (Stage 3,4, Unstageable, DTI, NWPT, and Complex wounds) if present, place Wound referral order by RN under : No    New Ostomies, if present place, Ostomy referral order under : No     Nurse 1 eSignature: Electronically signed by Ping Kimble RN on 1/8/24 at 3:30 PM EST    **SHARE this note so that the co-signing nurse can place an eSignature**    Nurse 2 eSignature: Electronically signed by Shruthi Oh RN on 1/8/24 at 3:33 PM EST   
Comprehensive Nutrition Assessment    Type and Reason for Visit:  Reassess    Nutrition Recommendations/Plan:   Continue Current Diet  Monitor nutrition progression and provide recommendations as indicated/medically appropriate   Consult RD as needed     Malnutrition Assessment:  Malnutrition Status:  Insufficient data (ELIUD d/t no wt hx in emr) (01/15/24 1059)    Context:  Acute Illness     Findings of the 6 clinical characteristics of malnutrition:  Energy Intake:  Mild decrease in energy intake (Comment) (poor intake prior to admission d/t n/v, NPO status)  Weight Loss:  Unable to assess     Body Fat Loss:  Unable to assess     Muscle Mass Loss:  Unable to assess    Fluid Accumulation:  No significant fluid accumulation     Strength:  Not Performed    Nutrition Assessment:    Pt admit for AMS. Pt nonverbal at baseline, ambulated independently. Pt +Flu, PT npo d/t failed speech eval 01/13/24, for coughing, choking and inability to remain alert. NG tube in place and suctioning green drainage. endoscopies to be schedule when pt more alert. Pt found to have hepatic steatosis. PMHx not on file. Continue NPO, monitor nutrition progression and provide recommendations as indicated/medically appropriate, f/up per policy. 01/19/23: F/up: PT at high risk for Mal/Re-feeding syndrome for day ten of no nutrition. PT has been NPO w/ NG tube in place to low suction for 9 days w/ output around 1,000 mls per shift. Pt is receiving dextrose, however his e'lytes are altered. Pt is to have EGD tomorrow d/t continued coffee ground output. This RD recommends TPN to meet pt's nutritional needs and contacted both nursing and MD. Judy NPO, continue inpatient monitoring, f/up per policy, TPN recommended. 01/22/24 : f/up: NG tube removed. TPN was intitiated 01/20/24 and discontinued as of now. Pt had speech eval on 01/20/24 and was placed on IDDsi 4 w/ thin liquids. EGD (01/19/24) revealed severe ulcerative esophagitis, acute ulcers 
Comprehensive Nutrition Assessment    Type and Reason for Visit:  Reassess    Nutrition Recommendations/Plan:   Continue NPO    Pt has been NPO w/ suction for 9 days, parenteral nutrition recommended as follows:    Central 3-in-1 custom @ 70mls/hr continuous x 24hr/d= 1700 mls TV to provide 105 grams AA, 263 grams dextrose, 38.4 grams lipids to provide 1700 kcals per day meeting 100% of protein and calorie requirements per day.     Pt at increased risk for Re-Feding Syndrome d/t minimal intake x>4-5 days - Highly recommend starting at no more than half of goals rate x first 24hrs and increase slowly as tolerated to goal w/ close monitroing of BGL/Lytes/Labs and correct PRN both prior to and during initation of nutrition support.        Malnutrition Assessment:  Malnutrition Status:  Insufficient data (ELIUD d/t no wt hx in emr) (01/15/24 1059)    Context:  Acute Illness     Findings of the 6 clinical characteristics of malnutrition:  Energy Intake:  Mild decrease in energy intake (Comment) (poor intake prior to admission d/t n/v, NPO status)  Weight Loss:  Unable to assess     Body Fat Loss:  Unable to assess     Muscle Mass Loss:  Unable to assess    Fluid Accumulation:  No significant fluid accumulation     Strength:  Not Performed    Nutrition Assessment:    Pt admit for AMS. Pt nonverbal at baseline, ambulated independently. Pt +Flu, PT npo d/t failed speech eval 01/13/24, for coughing, choking and inability to remain alert. NG tube in place and suctioning green drainage. endoscopies to be schedule when pt more alert. Pt found to have hepatic steatosis. PMHx not on file. Continue NPO, monitor nutrition progression and provide recommendations as indicated/medically appropriate, f/up per policy. 01/19/23: F/up: PT at high risk for Mal/Re-feeding syndrome for day ten of no nutrition. PT has been NPO w/ NG tube in place to low suction for 9 days w/ output around 1,000 mls per shift. Pt is receiving dextrose, 
Comprehensive Nutrition Assessment    Type and Reason for Visit:  Reassess    Nutrition Recommendations/Plan:   Continue NPO   Monitor nutrition progression and provide recommendations as indicated/medically appropriate      Malnutrition Assessment:  Malnutrition Status:  Insufficient data (ELIUD d/t no wt hx in emr) (01/15/24 1059)    Context:  Acute Illness     FindinMonitor nutrition progression and provide recommendations as indicated/medically appropriate gs of the 6 clinical characteristics of malnutrition:  Energy Intake:  Mild decrease in energy intake (Comment) (poor intake prior to admission d/t n/v, NPO status)  Weight Loss:  Unable to assess     Body Fat Loss:  Unable to assess     Muscle Mass Loss:  Unable to assess    Fluid Accumulation:  No significant fluid accumulation     Strength:  Not Performed    Nutrition Assessment:    Pt admit for AMS. Pt nonverbal at baseline, ambulated independently. Pt +Flu, PT npo d/t failed speech eval 01/13/24, for coughing, choking and inability to remain alert. NG tube in place and suctioning green drainage. endoscopies to be schedule when pt more alert. Pt found to have hepatic steatosis. PMHx not on file. Continue NPO, monitor nutrition progression and provide recommendations as indicated/medically appropriate, f/up per policy. 01/19/23: F/up: PT at high risk for Mal/Re-feeding syndrome for day ten of no nutrition. PT has been NPO w/ NG tube in place to low suction for 9 days w/ output around 1,000 mls per shift. Pt is receiving dextrose, however his e'lytes are altered. Pt is to have EGD tomorrow d/t continued coffee ground output. This RD recommends TPN to meet pt's nutritional needs and contacted both nursing and MDDenisse Kim NPO, continue inpatient monitoring, f/up per policy, TPN recommended. 01/22/24 : f/up: NG tube removed. TPN was intitiated 01/20/24 and discontinued as of now. Pt had speech eval on 01/20/24 and was placed on IDDsi 4 w/ thin liquids.  EGD 
Date:1/29/24     This patient's order for metoclopramide IV has been changed to PO as approved by the Pharmacy & Therapeutics and Medical Executive Committees.     If the patient should become strict NPO while on this therapy, contact the prescriber for further orders.   Kayli Marie [facility]  1/29/2024, 9:28 AM    
Department of Internal Medicine        CHIEF COMPLAINT: Altered mental status    Reason for Admission: Acute metabolic encephalopathy with UTI    HISTORY OF PRESENT ILLNESS:      The patient is a 68 y.o. male who presents with altered mental status.  Patient according to family is usually able to get around by himself with family has noticed lately he is shuffling his feet and not able to walk as easily.  Family denies any falls or head trauma.  Family states patient denied any pain or problems just prior to this.  Paramedics were called and patient was found to be hypoxic with O2 sat 85%.  Patient does not have home O2.  In the ED the patient was noted to have elevated creatinine 1.5 with WBC 8.7 hemoglobin 14.7.  Patient was positive for influenza A with screening.  Urinalysis showed 21-50 WBCs and +3 bacteria and large leukocyte esterase.  Temperature was 99.5 initially heart rate 91 and blood pressure currently 118/68.  Patient's O2 sat is 96% on 3 L currently.  Chest x-ray and CT of the head without contrast were both unremarkable.  Currently the patient has his eyes closed and resists opening when trying to check the pupils.  When doing physical exam trying to move his arms and legs he would slap at me with his right arm.    1/9/2024  Patient seen and examined on [facility] [place] [place].  Patient is little bit more alert and oriented today.  Patient though will not make eye contact but he will talk to me today and gives very short simple answers to questions.  BUN/creatinine 16/1.0 with normal electrolytes.  Blood sugars range 115-155.  WBCs 9.0 with hemoglobin 13.7.  Temperature is 101 this morning with heart rate 95 and blood pressure 118/58.  O2 sat 94% on 3 L nasal cannula.  Check CT of the lungs today with contrast.    1/10/2024  Patient seen examined in [facility] [place] [place].  Patient had problems with nausea vomiting this morning according to the nurses.  Patient unable to take medication or have evaluation by speech therapy because of the 
Department of Internal Medicine        CHIEF COMPLAINT: Altered mental status    Reason for Admission: Acute metabolic encephalopathy with UTI    HISTORY OF PRESENT ILLNESS:      The patient is a 68 y.o. male who presents with altered mental status.  Patient according to family is usually able to get around by himself with family has noticed lately he is shuffling his feet and not able to walk as easily.  Family denies any falls or head trauma.  Family states patient denied any pain or problems just prior to this.  Paramedics were called and patient was found to be hypoxic with O2 sat 85%.  Patient does not have home O2.  In the ED the patient was noted to have elevated creatinine 1.5 with WBC 8.7 hemoglobin 14.7.  Patient was positive for influenza A with screening.  Urinalysis showed 21-50 WBCs and +3 bacteria and large leukocyte esterase.  Temperature was 99.5 initially heart rate 91 and blood pressure currently 118/68.  Patient's O2 sat is 96% on 3 L currently.  Chest x-ray and CT of the head without contrast were both unremarkable.  Currently the patient has his eyes closed and resists opening when trying to check the pupils.  When doing physical exam trying to move his arms and legs he would slap at me with his right arm.    1/9/2024  Patient seen and examined on [facility] [place].  Patient is little bit more alert and oriented today.  Patient though will not make eye contact but he will talk to me today and gives very short simple answers to questions.  BUN/creatinine 16/1.0 with normal electrolytes.  Blood sugars range 115-155.  WBCs 9.0 with hemoglobin 13.7.  Temperature is 101 this morning with heart rate 95 and blood pressure 118/58.  O2 sat 94% on 3 L nasal cannula.  Check CT of the lungs today with contrast.    1/10/2024  Patient seen examined in [facility] [place].  Patient had problems with nausea vomiting this morning according to the nurses.  Patient unable to take medication or have evaluation by speech therapy because of the 
Department of Internal Medicine        CHIEF COMPLAINT: Altered mental status    Reason for Admission: Acute metabolic encephalopathy with UTI    HISTORY OF PRESENT ILLNESS:      The patient is a 68 y.o. male who presents with altered mental status.  Patient according to family is usually able to get around by himself with family has noticed lately he is shuffling his feet and not able to walk as easily.  Family denies any falls or head trauma.  Family states patient denied any pain or problems just prior to this.  Paramedics were called and patient was found to be hypoxic with O2 sat 85%.  Patient does not have home O2.  In the ED the patient was noted to have elevated creatinine 1.5 with WBC 8.7 hemoglobin 14.7.  Patient was positive for influenza A with screening.  Urinalysis showed 21-50 WBCs and +3 bacteria and large leukocyte esterase.  Temperature was 99.5 initially heart rate 91 and blood pressure currently 118/68.  Patient's O2 sat is 96% on 3 L currently.  Chest x-ray and CT of the head without contrast were both unremarkable.  Currently the patient has his eyes closed and resists opening when trying to check the pupils.  When doing physical exam trying to move his arms and legs he would slap at me with his right arm.    1/9/2024  [place] [facility] [place] [facility] [place]  Patient is little bit more alert and oriented today.  Patient though will not make eye contact but he will talk to me today and gives very short simple answers to questions.  BUN/creatinine 16/1.0 with normal electrolytes.  Blood sugars range 115-155.  WBCs 9.0 with hemoglobin 13.7.  Temperature is 101 this morning with heart rate 95 and blood pressure 118/58.  O2 sat 94% on 3 L nasal cannula.  Check CT of the lungs today with contrast.    1/10/2024  [place] [facility] [place]  Patient had problems with nausea vomiting this morning according to the nurses.  Patient unable to take medication or have evaluation by speech therapy because of the 
Department of Internal Medicine        CHIEF COMPLAINT: Altered mental status    Reason for Admission: Acute metabolic encephalopathy with UTI    HISTORY OF PRESENT ILLNESS:      The patient is a 68 y.o. male who presents with altered mental status.  Patient according to family is usually able to get around by himself with family has noticed lately he is shuffling his feet and not able to walk as easily.  Family denies any falls or head trauma.  Family states patient denied any pain or problems just prior to this.  Paramedics were called and patient was found to be hypoxic with O2 sat 85%.  Patient does not have home O2.  In the ED the patient was noted to have elevated creatinine 1.5 with WBC 8.7 hemoglobin 14.7.  Patient was positive for influenza A with screening.  Urinalysis showed 21-50 WBCs and +3 bacteria and large leukocyte esterase.  Temperature was 99.5 initially heart rate 91 and blood pressure currently 118/68.  Patient's O2 sat is 96% on 3 L currently.  Chest x-ray and CT of the head without contrast were both unremarkable.  Currently the patient has his eyes closed and resists opening when trying to check the pupils.  When doing physical exam trying to move his arms and legs he would slap at me with his right arm.    1/9/2024  Patient seen and examined on [facility] [place]  Patient is little bit more alert and oriented today.  Patient though will not make eye contact but he will talk to me today and gives very short simple answers to questions.  BUN/creatinine 16/1.0 with normal electrolytes.  Blood sugars range 115-155.  WBCs 9.0 with hemoglobin 13.7.  Temperature is 101 this morning with heart rate 95 and blood pressure 118/58.  O2 sat 94% on 3 L nasal cannula.  Check CT of the lungs today with contrast.    1/10/2024  Patient seen examined in [facility] [place]  Patient had problems with nausea vomiting this morning according to the nurses.  Patient unable to take medication or have evaluation by speech therapy because of the 
Department of Internal Medicine        CHIEF COMPLAINT: Altered mental status    Reason for Admission: Acute metabolic encephalopathy with UTI    HISTORY OF PRESENT ILLNESS:      The patient is a 68 y.o. male who presents with altered mental status.  Patient according to family is usually able to get around by himself with family has noticed lately he is shuffling his feet and not able to walk as easily.  Family denies any falls or head trauma.  Family states patient denied any pain or problems just prior to this.  Paramedics were called and patient was found to be hypoxic with O2 sat 85%.  Patient does not have home O2.  In the ED the patient was noted to have elevated creatinine 1.5 with WBC 8.7 hemoglobin 14.7.  Patient was positive for influenza A with screening.  Urinalysis showed 21-50 WBCs and +3 bacteria and large leukocyte esterase.  Temperature was 99.5 initially heart rate 91 and blood pressure currently 118/68.  Patient's O2 sat is 96% on 3 L currently.  Chest x-ray and CT of the head without contrast were both unremarkable.  Currently the patient has his eyes closed and resists opening when trying to check the pupils.  When doing physical exam trying to move his arms and legs he would slap at me with his right arm.    1/9/2024  [place] [facility] [place]  Patient is little bit more alert and oriented today.  Patient though will not make eye contact but he will talk to me today and gives very short simple answers to questions.  BUN/creatinine 16/1.0 with normal electrolytes.  Blood sugars range 115-155.  WBCs 9.0 with hemoglobin 13.7.  Temperature is 101 this morning with heart rate 95 and blood pressure 118/58.  O2 sat 94% on 3 L nasal cannula.  Check CT of the lungs today with contrast.    1/10/2024  [place] [facility] [place]  Patient had problems with nausea vomiting this morning according to the nurses.  Patient unable to take medication or have evaluation by speech therapy because of the 
Department of Internal Medicine        CHIEF COMPLAINT: Altered mental status    Reason for Admission: Acute metabolic encephalopathy with UTI    HISTORY OF PRESENT ILLNESS:      The patient is a 68 y.o. male who presents with altered mental status.  Patient according to family is usually able to get around by himself with family has noticed lately he is shuffling his feet and not able to walk as easily.  Family denies any falls or head trauma.  Family states patient denied any pain or problems just prior to this.  Paramedics were called and patient was found to be hypoxic with O2 sat 85%.  Patient does not have home O2.  In the ED the patient was noted to have elevated creatinine 1.5 with WBC 8.7 hemoglobin 14.7.  Patient was positive for influenza A with screening.  Urinalysis showed 21-50 WBCs and +3 bacteria and large leukocyte esterase.  Temperature was 99.5 initially heart rate 91 and blood pressure currently 118/68.  Patient's O2 sat is 96% on 3 L currently.  Chest x-ray and CT of the head without contrast were both unremarkable.  Currently the patient has his eyes closed and resists opening when trying to check the pupils.  When doing physical exam trying to move his arms and legs he would slap at me with his right arm.    1/9/2024  [place] [facility] [place]  Patient is little bit more alert and oriented today.  Patient though will not make eye contact but he will talk to me today and gives very short simple answers to questions.  BUN/creatinine 16/1.0 with normal electrolytes.  Blood sugars range 115-155.  WBCs 9.0 with hemoglobin 13.7.  Temperature is 101 this morning with heart rate 95 and blood pressure 118/58.  O2 sat 94% on 3 L nasal cannula.  Check CT of the lungs today with contrast.    1/10/2024  [place] [facility] [place]  Patient had problems with nausea vomiting this morning according to the nurses.  Patient unable to take medication or have evaluation by speech therapy because of the 
Department of Internal Medicine        CHIEF COMPLAINT: Altered mental status    Reason for Admission: Acute metabolic encephalopathy with UTI    HISTORY OF PRESENT ILLNESS:      The patient is a 68 y.o. male who presents with altered mental status.  Patient according to family is usually able to get around by himself with family has noticed lately he is shuffling his feet and not able to walk as easily.  Family denies any falls or head trauma.  Family states patient denied any pain or problems just prior to this.  Paramedics were called and patient was found to be hypoxic with O2 sat 85%.  Patient does not have home O2.  In the ED the patient was noted to have elevated creatinine 1.5 with WBC 8.7 hemoglobin 14.7.  Patient was positive for influenza A with screening.  Urinalysis showed 21-50 WBCs and +3 bacteria and large leukocyte esterase.  Temperature was 99.5 initially heart rate 91 and blood pressure currently 118/68.  Patient's O2 sat is 96% on 3 L currently.  Chest x-ray and CT of the head without contrast were both unremarkable.  Currently the patient has his eyes closed and resists opening when trying to check the pupils.  When doing physical exam trying to move his arms and legs he would slap at me with his right arm.    1/9/2024  Patient seen and examined on [facility] [place].  Patient is little bit more alert and oriented today.  Patient though will not make eye contact but he will talk to me today and gives very short simple answers to questions.  BUN/creatinine 16/1.0 with normal electrolytes.  Blood sugars range 115-155.  WBCs 9.0 with hemoglobin 13.7.  Temperature is 101 this morning with heart rate 95 and blood pressure 118/58.  O2 sat 94% on 3 L nasal cannula.  Check CT of the lungs today with contrast.    Past Medical History:    Past Medical History:   Diagnosis Date    Smoker 01/08/2024    4 cartons per month     Past Surgical History:    History reviewed. No pertinent surgical history.    Medications Prior 
Department of Internal Medicine        CHIEF COMPLAINT: Altered mental status    Reason for Admission: Acute metabolic encephalopathy with UTI    HISTORY OF PRESENT ILLNESS:      The patient is a 68 y.o. male who presents with altered mental status.  Patient according to family is usually able to get around by himself with family has noticed lately he is shuffling his feet and not able to walk as easily.  Family denies any falls or head trauma.  Family states patient denied any pain or problems just prior to this.  Paramedics were called and patient was found to be hypoxic with O2 sat 85%.  Patient does not have home O2.  In the ED the patient was noted to have elevated creatinine 1.5 with WBC 8.7 hemoglobin 14.7.  Patient was positive for influenza A with screening.  Urinalysis showed 21-50 WBCs and +3 bacteria and large leukocyte esterase.  Temperature was 99.5 initially heart rate 91 and blood pressure currently 118/68.  Patient's O2 sat is 96% on 3 L currently.  Chest x-ray and CT of the head without contrast were both unremarkable.  Currently the patient has his eyes closed and resists opening when trying to check the pupils.  When doing physical exam trying to move his arms and legs he would slap at me with his right arm.    1/9/2024  Patient seen and [facility] on [facility].  Patient is little bit more alert and oriented today.  Patient though will not make eye contact but he will talk to me today and gives very short simple answers to questions.  BUN/creatinine 16/1.0 with normal electrolytes.  Blood sugars range 115-155.  WBCs 9.0 with hemoglobin 13.7.  Temperature is 101 this morning with heart rate 95 and blood pressure 118/58.  O2 sat 94% on 3 L nasal cannula.  Check CT of the lungs today with contrast.    1/10/2024  Patient seen examined in [facility].  Patient had problems with nausea vomiting this morning according to the nurses.  Patient unable to take medication or have evaluation by speech therapy because of the 
Department of Internal Medicine        CHIEF COMPLAINT: Altered mental status    Reason for Admission: Acute metabolic encephalopathy with UTI    HISTORY OF PRESENT ILLNESS:      The patient is a 68 y.o. male who presents with altered mental status.  Patient according to family is usually able to get around by himself with family has noticed lately he is shuffling his feet and not able to walk as easily.  Family denies any falls or head trauma.  Family states patient denied any pain or problems just prior to this.  Paramedics were called and patient was found to be hypoxic with O2 sat 85%.  Patient does not have home O2.  In the ED the patient was noted to have elevated creatinine 1.5 with WBC 8.7 hemoglobin 14.7.  Patient was positive for influenza A with screening.  Urinalysis showed 21-50 WBCs and +3 bacteria and large leukocyte esterase.  Temperature was 99.5 initially heart rate 91 and blood pressure currently 118/68.  Patient's O2 sat is 96% on 3 L currently.  Chest x-ray and CT of the head without contrast were both unremarkable.  Currently the patient has his eyes closed and resists opening when trying to check the pupils.  When doing physical exam trying to move his arms and legs he would slap at me with his right arm.    1/9/2024  Patient seen and examined on [facility] [place].  Patient is little bit more alert and oriented today.  Patient though will not make eye contact but he will talk to me today and gives very short simple answers to questions.  BUN/creatinine 16/1.0 with normal electrolytes.  Blood sugars range 115-155.  WBCs 9.0 with hemoglobin 13.7.  Temperature is 101 this morning with heart rate 95 and blood pressure 118/58.  O2 sat 94% on 3 L nasal cannula.  Check CT of the lungs today with contrast.    1/10/2024  Patient seen examined in [facility] [place].  Patient had problems with nausea vomiting this morning according to the nurses.  Patient unable to take medication or have evaluation by speech therapy because of the 
Department of Internal Medicine        CHIEF COMPLAINT: Altered mental status    Reason for Admission: Acute metabolic encephalopathy with UTI    HISTORY OF PRESENT ILLNESS:      The patient is a 68 y.o. male who presents with altered mental status.  Patient according to family is usually able to get around by himself with family has noticed lately he is shuffling his feet and not able to walk as easily.  Family denies any falls or head trauma.  Family states patient denied any pain or problems just prior to this.  Paramedics were called and patient was found to be hypoxic with O2 sat 85%.  Patient does not have home O2.  In the ED the patient was noted to have elevated creatinine 1.5 with WBC 8.7 hemoglobin 14.7.  Patient was positive for influenza A with screening.  Urinalysis showed 21-50 WBCs and +3 bacteria and large leukocyte esterase.  Temperature was 99.5 initially heart rate 91 and blood pressure currently 118/68.  Patient's O2 sat is 96% on 3 L currently.  Chest x-ray and CT of the head without contrast were both unremarkable.  Currently the patient has his eyes closed and resists opening when trying to check the pupils.  When doing physical exam trying to move his arms and legs he would slap at me with his right arm.    1/9/2024  Patient seen and examined on [facility] [place].  Patient is little bit more alert and oriented today.  Patient though will not make eye contact but he will talk to me today and gives very short simple answers to questions.  BUN/creatinine 16/1.0 with normal electrolytes.  Blood sugars range 115-155.  WBCs 9.0 with hemoglobin 13.7.  Temperature is 101 this morning with heart rate 95 and blood pressure 118/58.  O2 sat 94% on 3 L nasal cannula.  Check CT of the lungs today with contrast.    1/10/2024  Patient seen examined in [facility] [place].  Patient had problems with nausea vomiting this morning according to the nurses.  Patient unable to take medication or have evaluation by speech therapy because of the 
Department of Internal Medicine        CHIEF COMPLAINT: Altered mental status    Reason for Admission: Acute metabolic encephalopathy with UTI    HISTORY OF PRESENT ILLNESS:      The patient is a 68 y.o. male who presents with altered mental status.  Patient according to family is usually able to get around by himself with family has noticed lately he is shuffling his feet and not able to walk as easily.  Family denies any falls or head trauma.  Family states patient denied any pain or problems just prior to this.  Paramedics were called and patient was found to be hypoxic with O2 sat 85%.  Patient does not have home O2.  In the ED the patient was noted to have elevated creatinine 1.5 with WBC 8.7 hemoglobin 14.7.  Patient was positive for influenza A with screening.  Urinalysis showed 21-50 WBCs and +3 bacteria and large leukocyte esterase.  Temperature was 99.5 initially heart rate 91 and blood pressure currently 118/68.  Patient's O2 sat is 96% on 3 L currently.  Chest x-ray and CT of the head without contrast were both unremarkable.  Currently the patient has his eyes closed and resists opening when trying to check the pupils.  When doing physical exam trying to move his arms and legs he would slap at me with his right arm.    1/9/2024  [place] [facility] [place]  Patient is little bit more alert and oriented today.  Patient though will not make eye contact but he will talk to me today and gives very short simple answers to questions.  BUN/creatinine 16/1.0 with normal electrolytes.  Blood sugars range 115-155.  WBCs 9.0 with hemoglobin 13.7.  Temperature is 101 this morning with heart rate 95 and blood pressure 118/58.  O2 sat 94% on 3 L nasal cannula.  Check CT of the lungs today with contrast.    1/10/2024  [place] [facility] [place]  Patient had problems with nausea vomiting this morning according to the nurses.  Patient unable to take medication or have evaluation by speech therapy because of the 
Department of Internal Medicine        CHIEF COMPLAINT: Altered mental status    Reason for Admission: Acute metabolic encephalopathy with UTI    HISTORY OF PRESENT ILLNESS:      The patient is a 68 y.o. male who presents with altered mental status.  Patient according to family is usually able to get around by himself with family has noticed lately he is shuffling his feet and not able to walk as easily.  Family denies any falls or head trauma.  Family states patient denied any pain or problems just prior to this.  Paramedics were called and patient was found to be hypoxic with O2 sat 85%.  Patient does not have home O2.  In the ED the patient was noted to have elevated creatinine 1.5 with WBC 8.7 hemoglobin 14.7.  Patient was positive for influenza A with screening.  Urinalysis showed 21-50 WBCs and +3 bacteria and large leukocyte esterase.  Temperature was 99.5 initially heart rate 91 and blood pressure currently 118/68.  Patient's O2 sat is 96% on 3 L currently.  Chest x-ray and CT of the head without contrast were both unremarkable.  Currently the patient has his eyes closed and resists opening when trying to check the pupils.  When doing physical exam trying to move his arms and legs he would slap at me with his right arm.    1/9/2024  Patient seen and examined on [facility] [place].  Patient is little bit more alert and oriented today.  Patient though will not make eye contact but he will talk to me today and gives very short simple answers to questions.  BUN/creatinine 16/1.0 with normal electrolytes.  Blood sugars range 115-155.  WBCs 9.0 with hemoglobin 13.7.  Temperature is 101 this morning with heart rate 95 and blood pressure 118/58.  O2 sat 94% on 3 L nasal cannula.  Check CT of the lungs today with contrast.    1/10/2024  Patient seen examined in [facility] [place].  Patient had problems with nausea vomiting this morning according to the nurses.  Patient unable to take medication or have evaluation by speech therapy because of the 
Department of Internal Medicine        CHIEF COMPLAINT: Altered mental status    Reason for Admission: Acute metabolic encephalopathy with UTI    HISTORY OF PRESENT ILLNESS:      The patient is a 68 y.o. male who presents with altered mental status.  Patient according to family is usually able to get around by himself with family has noticed lately he is shuffling his feet and not able to walk as easily.  Family denies any falls or head trauma.  Family states patient denied any pain or problems just prior to this.  Paramedics were called and patient was found to be hypoxic with O2 sat 85%.  Patient does not have home O2.  In the ED the patient was noted to have elevated creatinine 1.5 with WBC 8.7 hemoglobin 14.7.  Patient was positive for influenza A with screening.  Urinalysis showed 21-50 WBCs and +3 bacteria and large leukocyte esterase.  Temperature was 99.5 initially heart rate 91 and blood pressure currently 118/68.  Patient's O2 sat is 96% on 3 L currently.  Chest x-ray and CT of the head without contrast were both unremarkable.  Currently the patient has his eyes closed and resists opening when trying to check the pupils.  When doing physical exam trying to move his arms and legs he would slap at me with his right arm.    1/9/2024  Patient [place] and [place] [facility] [place]  Patient is little bit more alert and oriented today.  Patient though will not make eye contact but he will talk to me today and gives very short simple answers to questions.  BUN/creatinine 16/1.0 with normal electrolytes.  Blood sugars range 115-155.  WBCs 9.0 with hemoglobin 13.7.  Temperature is 101 this morning with heart rate 95 and blood pressure 118/58.  O2 sat 94% on 3 L nasal cannula.  Check CT of the lungs today with contrast.    1/10/2024  Patient seen examined in [facility] [place].  Patient had problems with nausea vomiting this morning according to the nurses.  Patient unable to take medication or have evaluation by speech therapy because of the 
Department of Internal Medicine        CHIEF COMPLAINT: Altered mental status    Reason for Admission: Acute metabolic encephalopathy with UTI    HISTORY OF PRESENT ILLNESS:      The patient is a 68 y.o. male who presents with altered mental status.  Patient according to family is usually able to get around by himself with family has noticed lately he is shuffling his feet and not able to walk as easily.  Family denies any falls or head trauma.  Family states patient denied any pain or problems just prior to this.  Paramedics were called and patient was found to be hypoxic with O2 sat 85%.  Patient does not have home O2.  In the ED the patient was noted to have elevated creatinine 1.5 with WBC 8.7 hemoglobin 14.7.  Patient was positive for influenza A with screening.  Urinalysis showed 21-50 WBCs and +3 bacteria and large leukocyte esterase.  Temperature was 99.5 initially heart rate 91 and blood pressure currently 118/68.  Patient's O2 sat is 96% on 3 L currently.  Chest x-ray and CT of the head without contrast were both unremarkable.  Currently the patient has his eyes closed and resists opening when trying to check the pupils.  When doing physical exam trying to move his arms and legs he would slap at me with his right arm.    1/9/2024  Patient [place] and [place] [facility] [place]  Patient is little bit more alert and oriented today.  Patient though will not make eye contact but he will talk to me today and gives very short simple answers to questions.  BUN/creatinine 16/1.0 with normal electrolytes.  Blood sugars range 115-155.  WBCs 9.0 with hemoglobin 13.7.  Temperature is 101 this morning with heart rate 95 and blood pressure 118/58.  O2 sat 94% on 3 L nasal cannula.  Check CT of the lungs today with contrast.    1/10/2024  Patient seen examined in [facility] [place].  Patient had problems with nausea vomiting this morning according to the nurses.  Patient unable to take medication or have evaluation by speech therapy because of the 
Department of Internal Medicine        CHIEF COMPLAINT: Altered mental status    Reason for Admission: Acute metabolic encephalopathy with UTI    HISTORY OF PRESENT ILLNESS:      The patient is a 68 y.o. male who presents with altered mental status.  Patient according to family is usually able to get around by himself with family has noticed lately he is shuffling his feet and not able to walk as easily.  Family denies any falls or head trauma.  Family states patient denied any pain or problems just prior to this.  Paramedics were called and patient was found to be hypoxic with O2 sat 85%.  Patient does not have home O2.  In the ED the patient was noted to have elevated creatinine 1.5 with WBC 8.7 hemoglobin 14.7.  Patient was positive for influenza A with screening.  Urinalysis showed 21-50 WBCs and +3 bacteria and large leukocyte esterase.  Temperature was 99.5 initially heart rate 91 and blood pressure currently 118/68.  Patient's O2 sat is 96% on 3 L currently.  Chest x-ray and CT of the head without contrast were both unremarkable.  Currently the patient has his eyes closed and resists opening when trying to check the pupils.  When doing physical exam trying to move his arms and legs he would slap at me with his right arm.    1/9/2024  Patient seen and examined on [facility].  Patient is little bit more alert and oriented today.  Patient though will not make eye contact but he will talk to me today and gives very short simple answers to questions.  BUN/creatinine 16/1.0 with normal electrolytes.  Blood sugars range 115-155.  WBCs 9.0 with hemoglobin 13.7.  Temperature is 101 this morning with heart rate 95 and blood pressure 118/58.  O2 sat 94% on 3 L nasal cannula.  Check CT of the lungs today with contrast.    1/10/2024  Patient seen examined in [facility].  Patient had problems with nausea vomiting this morning according to the nurses.  Patient unable to take medication or have evaluation by speech therapy because of the 
Department of Internal Medicine        CHIEF COMPLAINT: Altered mental status    Reason for Admission: Acute metabolic encephalopathy with UTI    HISTORY OF PRESENT ILLNESS:      The patient is a 68 y.o. male who presents with altered mental status.  Patient according to family is usually able to get around by himself with family has noticed lately he is shuffling his feet and not able to walk as easily.  Family denies any falls or head trauma.  Family states patient denied any pain or problems just prior to this.  Paramedics were called and patient was found to be hypoxic with O2 sat 85%.  Patient does not have home O2.  In the ED the patient was noted to have elevated creatinine 1.5 with WBC 8.7 hemoglobin 14.7.  Patient was positive for influenza A with screening.  Urinalysis showed 21-50 WBCs and +3 bacteria and large leukocyte esterase.  Temperature was 99.5 initially heart rate 91 and blood pressure currently 118/68.  Patient's O2 sat is 96% on 3 L currently.  Chest x-ray and CT of the head without contrast were both unremarkable.  Currently the patient has his eyes closed and resists opening when trying to check the pupils.  When doing physical exam trying to move his arms and legs he would slap at me with his right arm.    1/9/2024  Patient seen and examined on [facility] [place].  Patient is little bit more alert and oriented today.  Patient though will not make eye contact but he will talk to me today and gives very short simple answers to questions.  BUN/creatinine 16/1.0 with normal electrolytes.  Blood sugars range 115-155.  WBCs 9.0 with hemoglobin 13.7.  Temperature is 101 this morning with heart rate 95 and blood pressure 118/58.  O2 sat 94% on 3 L nasal cannula.  Check CT of the lungs today with contrast.    1/10/2024  Patient seen examined in [facility] [place].  Patient had problems with nausea vomiting this morning according to the nurses.  Patient unable to take medication or have evaluation by speech therapy because of the 
Dr Gallo paged re: patient low grade temp. Need for oxygen, continued abdominal distention and n/v with medication administration or any oral intake. Awaiting return call.  
EGD completed, note dictated.  Findings: Esophagitis!!,  Polyp GE junction, gastritis.  Biopsies pending.  
Gastro occult positive PH 7  
His  attempted to meet with the patient for a length of stay visit and found the patient sleeping. This  left literature of the visit. This  will follow up as needed.  
MRSA swab done of nares. Patient tolerated well.  Abdomen remains taut and drum-like at this time. Patient remains on 4L of oxygen. No further emesis this shift, as patient has been NPO.   Patient is rambling to self, but is answering questions and glancing at staff when being addressed. He does follow commands and is easily redirected, but remains impulsive and unpredictable.  
NEPHROLOGY Attending   Progress Note  1/22/2024 10:36 AM  Subjective:   Admit Date: 1/8/2024  PCP: Toshia Perea APRN - CNP    Interval History:     1/22/24: Ng tube is out now - he is to have a swallow eval today and a small bowel follow through - currently on 3L O2 and 97% sat - flat affect today - not very interactive w/ me - all care reviewed w/ the primary RN      Diet: Diet NPO Exceptions are: Ice Chips, Sips of Water with Meds    Data:   Scheduled Meds:   oxymetazoline  2 spray Each Nostril Once    lidocaine   Topical Once    pantoprazole (PROTONIX) 40 mg in sodium chloride (PF) 0.9 % 10 mL injection  40 mg IntraVENous Q12H    metoclopramide  10 mg IntraVENous Q6H    ipratropium 0.5 mg-albuterol 2.5 mg  1 Dose Inhalation Q4H WA RT    clonazePAM  0.5 mg Per NG tube QAM    clonazePAM  1.5 mg Per NG tube Nightly    clonazePAM  0.25 mg Per NG tube Daily    methylPREDNISolone  40 mg IntraVENous Daily    sodium chloride (Inhalant)  4 mL Nebulization BID    piperacillin-tazobactam  3,375 mg IntraVENous Q8H    nicotine  1 patch TransDERmal Daily    sodium chloride flush  5-40 mL IntraVENous 2 times per day    sodium chloride flush  5-40 mL IntraVENous 2 times per day    benztropine  2 mg Oral BID    [Held by provider] metFORMIN  500 mg Oral Daily    enoxaparin  40 mg SubCUTAneous Daily    budesonide  500 mcg Nebulization BID RT    insulin lispro  0-8 Units SubCUTAneous TID WC    insulin lispro  0-4 Units SubCUTAneous Nightly    cloZAPine  100 mg Oral QAM    cloZAPine  200 mg Oral Nightly     Continuous Infusions:   sodium chloride      dextrose       PRN Meds:LORazepam, acetaminophen, sodium chloride flush, sodium chloride flush, sodium chloride, ondansetron **OR** ondansetron, acetaminophen, prochlorperazine, polyethylene glycol, glucose, dextrose bolus **OR** dextrose bolus, glucagon (rDNA), dextrose      Intake/Output Summary (Last 24 hours) at 1/22/2024 1036  Last data filed at 1/22/2024 0541  Gross per 24 hour 
NEPHROLOGY Attending   Progress Note  1/23/2024 9:53 AM  Subjective:   Admit Date: 1/8/2024  PCP: Toshia Perea APRN - CNP    Interval History:     1/23/24: s/p modified barium swallow on 1/22/24 - per GI shows no aspiration - very poor oral intake - not very interactive w/ me - opens eyes but not not following commands - afebrile with stable blood pressure      Diet: ADULT DIET; Dysphagia - Pureed; Mildly Thick (Nectar)    Data:   Scheduled Meds:   oxymetazoline  2 spray Each Nostril Once    lidocaine   Topical Once    pantoprazole (PROTONIX) 40 mg in sodium chloride (PF) 0.9 % 10 mL injection  40 mg IntraVENous Q12H    metoclopramide  10 mg IntraVENous Q6H    ipratropium 0.5 mg-albuterol 2.5 mg  1 Dose Inhalation Q4H WA RT    clonazePAM  0.5 mg Per NG tube QAM    clonazePAM  1.5 mg Per NG tube Nightly    clonazePAM  0.25 mg Per NG tube Daily    methylPREDNISolone  40 mg IntraVENous Daily    sodium chloride (Inhalant)  4 mL Nebulization BID    piperacillin-tazobactam  3,375 mg IntraVENous Q8H    nicotine  1 patch TransDERmal Daily    sodium chloride flush  5-40 mL IntraVENous 2 times per day    sodium chloride flush  5-40 mL IntraVENous 2 times per day    benztropine  2 mg Oral BID    [Held by provider] metFORMIN  500 mg Oral Daily    enoxaparin  40 mg SubCUTAneous Daily    budesonide  500 mcg Nebulization BID RT    insulin lispro  0-8 Units SubCUTAneous TID WC    insulin lispro  0-4 Units SubCUTAneous Nightly    cloZAPine  100 mg Oral QAM    cloZAPine  200 mg Oral Nightly     Continuous Infusions:   sodium chloride      dextrose       PRN Meds:LORazepam, acetaminophen, sodium chloride flush, sodium chloride flush, sodium chloride, ondansetron **OR** ondansetron, acetaminophen, prochlorperazine, polyethylene glycol, glucose, dextrose bolus **OR** dextrose bolus, glucagon (rDNA), dextrose      Intake/Output Summary (Last 24 hours) at 1/23/2024 0953  Last data filed at 1/23/2024 0949  Gross per 24 hour   Intake 250 
NEPHROLOGY Attending   Progress Note  1/24/2024 11:57 AM  Subjective:   Admit Date: 1/8/2024  PCP: Toshia Perea APRN - CNP    Interval History:     1/24/24: no acute issues overnight - per nursing transferred to chair with assist - tolerated chair for 30 mins - poor appetite for breakfast - afebrile with stable blood pressure - no family present       Diet: ADULT DIET; Dysphagia - Pureed; Mildly Thick (Nectar)    Data:   Scheduled Meds:   oxymetazoline  2 spray Each Nostril Once    lidocaine   Topical Once    pantoprazole (PROTONIX) 40 mg in sodium chloride (PF) 0.9 % 10 mL injection  40 mg IntraVENous Q12H    metoclopramide  10 mg IntraVENous Q6H    ipratropium 0.5 mg-albuterol 2.5 mg  1 Dose Inhalation Q4H WA RT    clonazePAM  0.5 mg Per NG tube QAM    clonazePAM  1.5 mg Per NG tube Nightly    clonazePAM  0.25 mg Per NG tube Daily    methylPREDNISolone  40 mg IntraVENous Daily    sodium chloride (Inhalant)  4 mL Nebulization BID    piperacillin-tazobactam  3,375 mg IntraVENous Q8H    nicotine  1 patch TransDERmal Daily    sodium chloride flush  5-40 mL IntraVENous 2 times per day    sodium chloride flush  5-40 mL IntraVENous 2 times per day    benztropine  2 mg Oral BID    [Held by provider] metFORMIN  500 mg Oral Daily    enoxaparin  40 mg SubCUTAneous Daily    budesonide  500 mcg Nebulization BID RT    insulin lispro  0-8 Units SubCUTAneous TID WC    insulin lispro  0-4 Units SubCUTAneous Nightly    cloZAPine  100 mg Oral QAM    cloZAPine  200 mg Oral Nightly     Continuous Infusions:   sodium chloride      dextrose       PRN Meds:LORazepam, acetaminophen, sodium chloride flush, sodium chloride flush, sodium chloride, ondansetron **OR** ondansetron, acetaminophen, prochlorperazine, polyethylene glycol, glucose, dextrose bolus **OR** dextrose bolus, glucagon (rDNA), dextrose      Intake/Output Summary (Last 24 hours) at 1/24/2024 1157  Last data filed at 1/24/2024 0406  Gross per 24 hour   Intake --   Output 
NEPHROLOGY Attending   Progress Note  1/25/2024 1:44 PM  Subjective:   Admit Date: 1/8/2024  PCP: Toshia Perea APRN - CNP    Interval History:     1/25/24: no significant changes in neuro status - awake but not following commands - poor appetite for breakfast - afebrile with stable blood pressure - no family present       Diet: ADULT DIET; Dysphagia - Pureed; Mildly Thick (Nectar)  Diet NPO Exceptions are: Ice Chips, Sips of Water with Meds    Data:   Scheduled Meds:   lidocaine   Topical Once    pantoprazole (PROTONIX) 40 mg in sodium chloride (PF) 0.9 % 10 mL injection  40 mg IntraVENous Q12H    metoclopramide  10 mg IntraVENous Q6H    ipratropium 0.5 mg-albuterol 2.5 mg  1 Dose Inhalation Q4H WA RT    clonazePAM  0.5 mg Per NG tube QAM    clonazePAM  1.5 mg Per NG tube Nightly    clonazePAM  0.25 mg Per NG tube Daily    methylPREDNISolone  40 mg IntraVENous Daily    sodium chloride (Inhalant)  4 mL Nebulization BID    nicotine  1 patch TransDERmal Daily    sodium chloride flush  5-40 mL IntraVENous 2 times per day    sodium chloride flush  5-40 mL IntraVENous 2 times per day    benztropine  2 mg Oral BID    [Held by provider] metFORMIN  500 mg Oral Daily    enoxaparin  40 mg SubCUTAneous Daily    budesonide  500 mcg Nebulization BID RT    insulin lispro  0-8 Units SubCUTAneous TID WC    insulin lispro  0-4 Units SubCUTAneous Nightly    cloZAPine  100 mg Oral QAM    cloZAPine  200 mg Oral Nightly     Continuous Infusions:   sodium chloride      dextrose       PRN Meds:prochlorperazine, LORazepam, acetaminophen, sodium chloride flush, sodium chloride flush, sodium chloride, ondansetron **OR** ondansetron, acetaminophen, polyethylene glycol, glucose, dextrose bolus **OR** dextrose bolus, glucagon (rDNA), dextrose      Intake/Output Summary (Last 24 hours) at 1/25/2024 1344  Last data filed at 1/25/2024 1311  Gross per 24 hour   Intake 0 ml   Output 975 ml   Net -975 ml       CBC:   Recent Labs     01/23/24  0612 
Nurse at bedside to empty NG suction reservoir- pt quickly removing NG tube 1/2 to 3/4 of the way. Nurse reinserted. Obtain CXR to determine proper placement   
O2 titration- see flowsheet. Weaned to 7L prior to change of shift. SpO2 95% w/ some dips down to 92%  HR and respirations varying 80s-100 and 26-32 respectively.  
OCCUPATIONAL THERAPY INITIAL EVALUATION    [facility]  [address] [place]        Date:1/10/2024                                                  Patient Name: Aureliano Falcon    MRN: [id number]    : 1955    Room: [address] [place] [address]      Evaluating OT: Genevieve Pino OTR/L #[id number]     Referring Provider and Specific Provider Orders/Date:      24 1000  OT eval and treat  Start:  24 1000,   End:  24 1000,   ONE TIME,   Standing Count:  1 Occurrences,   R         Michael Gallo, DO      Placement Recommendation: Subacute Rehab        Diagnosis:   1. Acute metabolic encephalopathy    2. Influenza A    3. Urinary tract infection without hematuria, site unspecified         Surgery: None       Pertinent Medical History:       Past Medical History:   Diagnosis Date    Smoker 2024    4 cartons per month       History reviewed. No pertinent surgical history.     Precautions:  Fall Risk, alarm, droplet precautions, influenza, non-verbal per chart however patient responding to \"yes/no\" questions and mumbling, hx of nervous breakdown and subsequent schizophrenic diagnosis at end  (per chart/family report)      Assessment of current deficits    [x] Functional mobility  [x]ADLs  [x] Strength               [x]Cognition    [x] Functional transfers   [x] IADLs         [x] Safety Awareness   [x]Endurance    [] Fine Coordination              [x] Balance      [] Vision/perception   []Sensation     []Gross Motor Coordination  [] ROM  [] Delirium                   [] Motor Control     OT PLAN OF CARE   OT POC based on physician orders, patient diagnosis and results of clinical assessment    Frequency/Duration 1-3 days/wk for 2 weeks PRN     Specific OT Treatment Interventions to include:   * Instruction/training on adapted ADL techniques and AE recommendations to increase functional independence within precautions       * Training on energy 
OT SESSION ATTEMPT     Date:2024  Patient Name: Aureliano Falcon  MRN: [id number]  : 1955  Room: [address] [place] [address]     OCCUPATIONAL THERAPY TREATMENT NOTE    [facility]      Attempted OT session this date:    [] unavailable due to other medical staff currently with pt   [] on hold per nursing staff   [] on hold per nursing staff secondary to lab / radiology results    [x] declined treatment  this date due to no reason given, pt shaking head no to therapy session.  Benefits of participation in therapy reviewed with pt.    [] off unit   [] Other:     Continue with current OT P.O.C at a later date/time.      [place] FUENTES/ANALILIA [address]      
Orders received for  CT scan of abdomen and pelvis with oral contrast. Will notify Dr Gallo and CT of patient's inability to take in oral contrast.  
PROGRESS NOTE    By Win Hebert D.O.    [facility]  Dr. Hernan Cavanaugh MD, Dr. Sunny Soto MD, Dr Blas DO, Dr. Samir Michel MD, DO Aureliano Kohli Falcon  68 y.o.  male    SUBJECTIVE: Patient seen and examined with sitter at bedside. EGD cancelled due to respiratory status yesterday.     OBJECTIVE:    /64   Pulse 95   Temp (!) 100.7 °F (38.2 °C) (Axillary)   Resp 28   Ht 1.626 m (5' 4\")   Wt 70.1 kg (154 lb 9.6 oz)   SpO2 95%   BMI 26.54 kg/m²     Gen: NAD, lethargic  HEENT:PEERL, no icterus  Heart: RRR, no M/R/G  Lungs: CTAB  Abd.: soft, NT, ND, BS +, no G/R, no HSM  Extr.: no C/C/E, no bruising         Lab Results   Component Value Date/Time    WBC 9.5 01/11/2024 08:29 AM    WBC 9.7 01/10/2024 08:27 AM    WBC 9.0 01/09/2024 06:48 AM    HGB 15.4 01/11/2024 08:29 AM    HGB 14.4 01/10/2024 08:27 AM    HGB 13.7 01/09/2024 06:48 AM    HCT 46.9 01/11/2024 08:29 AM    MCV 92.9 01/11/2024 08:29 AM    RDW 14.6 01/11/2024 08:29 AM     01/11/2024 08:29 AM     01/10/2024 08:27 AM     01/09/2024 06:48 AM     Lab Results   Component Value Date/Time     01/13/2024 06:13 AM    K 4.3 01/13/2024 06:13 AM     01/13/2024 06:13 AM    CO2 20 01/13/2024 06:13 AM    BUN 41 01/13/2024 06:13 AM    CREATININE 1.5 01/13/2024 06:13 AM    CALCIUM 8.6 01/13/2024 06:13 AM    PROT 6.0 01/13/2024 06:13 AM    LABALBU 2.9 01/13/2024 06:13 AM    BILITOT 0.9 01/13/2024 06:13 AM    BILITOT 1.4 01/12/2024 05:07 AM    BILITOT 0.4 01/11/2024 08:29 AM    ALKPHOS 109 01/13/2024 06:13 AM    ALKPHOS 132 01/12/2024 05:07 AM    ALKPHOS 126 01/11/2024 08:29 AM    AST 21 01/13/2024 06:13 AM    AST 22 01/12/2024 05:07 AM    AST 31 01/11/2024 08:29 AM    ALT 15 01/13/2024 06:13 AM    ALT 16 01/12/2024 05:07 AM    ALT 18 01/11/2024 08:29 AM     Lab Results   Component Value Date/Time    LIPASE 23 01/08/2024 02:00 AM     No results found for: \"AMYLASE\"      ASSESSMENT/PLAN:  1. 
PROGRESS NOTE    By Win Hebert D.O.    [facility]  Dr. Hernan Cavanaugh MD, Dr. Sunny Soto MD, Dr Blas DO, Dr. Samir Michel MD, Dr. Win Hebert DO      Aureliano Falcon  68 y.o.  male    SUBJECTIVE: Patient seen and examined with sitter at bedside. Patient lethargic and unable to answer questions. No acute overnight events.     OBJECTIVE:    /73   Pulse 91   Temp 98.9 °F (37.2 °C) (Axillary)   Resp (!) 31   Ht 1.626 m (5' 4\")   Wt 70.1 kg (154 lb 9.6 oz)   SpO2 91%   BMI 26.54 kg/m²     Gen: NAD, lethargic  HEENT:PEERL, no icterus  Heart: RRR, no M/R/G  Lungs: CTAB  Abd.: soft, NT, ND, BS +, no G/R, no HSM  Extr.: no C/C/E, no bruising         Lab Results   Component Value Date/Time    WBC 9.5 01/11/2024 08:29 AM    WBC 9.7 01/10/2024 08:27 AM    WBC 9.0 01/09/2024 06:48 AM    HGB 15.4 01/11/2024 08:29 AM    HGB 14.4 01/10/2024 08:27 AM    HGB 13.7 01/09/2024 06:48 AM    HCT 46.9 01/11/2024 08:29 AM    MCV 92.9 01/11/2024 08:29 AM    RDW 14.6 01/11/2024 08:29 AM     01/11/2024 08:29 AM     01/10/2024 08:27 AM     01/09/2024 06:48 AM     Lab Results   Component Value Date/Time     01/13/2024 06:13 AM    K 4.3 01/13/2024 06:13 AM     01/13/2024 06:13 AM    CO2 20 01/13/2024 06:13 AM    BUN 41 01/13/2024 06:13 AM    CREATININE 1.5 01/13/2024 06:13 AM    CALCIUM 8.6 01/13/2024 06:13 AM    PROT 6.0 01/13/2024 06:13 AM    LABALBU 2.9 01/13/2024 06:13 AM    BILITOT 0.9 01/13/2024 06:13 AM    BILITOT 1.4 01/12/2024 05:07 AM    BILITOT 0.4 01/11/2024 08:29 AM    ALKPHOS 109 01/13/2024 06:13 AM    ALKPHOS 132 01/12/2024 05:07 AM    ALKPHOS 126 01/11/2024 08:29 AM    AST 21 01/13/2024 06:13 AM    AST 22 01/12/2024 05:07 AM    AST 31 01/11/2024 08:29 AM    ALT 15 01/13/2024 06:13 AM    ALT 16 01/12/2024 05:07 AM    ALT 18 01/11/2024 08:29 AM     Lab Results   Component Value Date/Time    LIPASE 23 01/08/2024 02:00 AM     No results found for: 
PROGRESS NOTE  By FLAQUITO Stanton    [facility]  Dr. Hernan Cavanaugh M.D.,  Dr. Sunny Soto M.D.,   HOWIE Barnett.SIN.,  Dr. Ramón Michel M.D.,  Dr. Win Hebert D.O.,          Aureliano Falcon  68 y.o.  male    SUBJECTIVE:  Patient resting in bed.  Lethargic but able to awaken.  Patient offers no complaints.     OBJECTIVE:    /69   Pulse 82   Temp 97.8 °F (36.6 °C)   Resp 18   Ht 1.626 m (5' 4.02\")   Wt 70.4 kg (155 lb 4.8 oz)   SpO2 92%   BMI 26.64 kg/m²     General: Somnolent/obtunded.  NAD.  HEENT: NG removed  Neck: Supple/trachea is midline  Chest: CTAB.  Persistent supplemental oxygen via [place]  Cor: Regular  Abd.: Soft.  Appears nontender.  Bowel sounds in all 4 quadrants.  Extr.:  No significant peripheral edema  Skin: Warm and dry      DATA:    Monitor data reviewed -sinus rhythm noted.    CT scan abdomen/pelvis:  1. Fluid-filled small bowel loops with mild wall thickening of the distal  ileum. Consider enteritis including inflammatory and infectious etiology.  2. Persistent urinary bladder wall thickening with gas in the bladder. Aldana  catheter in good position.  3. Right adrenal lipid rich adenoma.  4. Right lower lobe atelectasis or pneumonia. Lung bases have improved  compared to 01/10/2024.       Lab Results   Component Value Date/Time    WBC 13.0 01/21/2024 05:23 AM    RBC 3.78 01/21/2024 05:23 AM    HGB 11.7 01/21/2024 05:23 AM    HCT 34.8 01/21/2024 05:23 AM    MCV 92.1 01/21/2024 05:23 AM    MCH 31.0 01/21/2024 05:23 AM    MCHC 33.6 01/21/2024 05:23 AM    RDW 12.8 01/21/2024 05:23 AM     01/21/2024 05:23 AM    MPV 10.9 01/21/2024 05:23 AM     Lab Results   Component Value Date/Time     01/21/2024 05:23 AM    K 3.6 01/21/2024 05:23 AM    CL 99 01/21/2024 05:23 AM    CO2 34 01/21/2024 05:23 AM    BUN 19 01/21/2024 05:23 AM    CREATININE 1.4 01/21/2024 05:23 AM    CALCIUM 8.5 01/21/2024 05:23 AM    PROT 5.0 01/21/2024 05:23 AM    LABALBU 2.7 01/21/2024 
PROGRESS NOTE  By Jeffrey Charles, FLAQUITO    [facility]  Dr. Hernan Cavanaugh M.D.,  Dr. Sunny Soto M.D.,   Dr. Russ Landry D.O.,  Dr. Ramón Michel M.D.,  PRIETO KohliO.,          Aureliano Falcon  68 y.o.  male    SUBJECTIVE:  Patient resting in bed.  Minimally responsive.  Sitter at bedside.  Significant volume brown output from NG.    OBJECTIVE:    /61   Pulse 78   Temp 97.8 °F (36.6 °C) (Axillary)   Resp 20   Ht 1.626 m (5' 4.02\")   Wt 70.1 kg (154 lb 9.6 oz)   SpO2 98%   BMI 26.52 kg/m²     General: Somnolent/obtunded.  NAD.  HEENT: NG tube in place - dark secretion without coffee-ground material or blood  Neck: Supple/trachea is midline  Chest: CTAB.  Persistent supplemental oxygen via [place]  Cor: Regular  Abd.: Soft.  Appears nontender.  Bowel sounds in all 4 quadrants.  Extr.:  No significant peripheral edema  Skin: Warm and dry      DATA:    Monitor data reviewed -sinus rhythm noted.    CT scan abdomen/pelvis:  1. Fluid-filled small bowel loops with mild wall thickening of the distal  ileum. Consider enteritis including inflammatory and infectious etiology.  2. Persistent urinary bladder wall thickening with gas in the bladder. Aldana  catheter in good position.  3. Right adrenal lipid rich adenoma.  4. Right lower lobe atelectasis or pneumonia. Lung bases have improved  compared to 01/10/2024.       Lab Results   Component Value Date/Time    WBC 12.8 01/19/2024 07:28 AM    RBC 4.68 01/19/2024 07:28 AM    HGB 14.3 01/19/2024 07:28 AM    HCT 43.5 01/19/2024 07:28 AM    MCV 92.9 01/19/2024 07:28 AM    MCH 30.6 01/19/2024 07:28 AM    MCHC 32.9 01/19/2024 07:28 AM    RDW 13.1 01/19/2024 07:28 AM     01/19/2024 07:28 AM    MPV 10.4 01/19/2024 07:28 AM     Lab Results   Component Value Date/Time     01/19/2024 07:28 AM    K 3.3 01/19/2024 07:28 AM    CL 89 01/19/2024 07:28 AM    CO2 42 01/19/2024 07:28 AM    BUN 19 01/19/2024 07:28 AM    CREATININE 1.3 01/19/2024 07:28 AM 
PROGRESS NOTE  By Jeffrey Charles, FLAQUITO    [facility]  Dr. Hernan Cavanaugh M.D.,  Dr. Sunny Soto M.D.,   HOWIE Barnett.O.,  Dr. Ramón Michel M.D.,  Dr. Win Hebert D.O.,          Aureliano Falcon  68 y.o.  male    SUBJECTIVE:  Patient resting in bed.  Remains somewhat lethargic.  NG removed.  No nausea or vomiting.    OBJECTIVE:    /61   Pulse 80   Temp 98.7 °F (37.1 °C) (Axillary)   Resp 18   Ht 1.626 m (5' 4.02\")   Wt 69.7 kg (153 lb 9.6 oz)   SpO2 91%   BMI 26.35 kg/m²     General: Somnolent/obtunded.  NAD.  HEENT: NG tube in place - dark secretion without coffee-ground material or blood  Neck: Supple/trachea is midline  Chest: CTAB.  Persistent supplemental oxygen via [place]  Cor: Regular  Abd.: Soft.  Appears nontender.  Bowel sounds in all 4 quadrants.  Extr.:  No significant peripheral edema  Skin: Warm and dry      DATA:    Monitor data reviewed -sinus rhythm noted.    CT scan abdomen/pelvis:  1. Fluid-filled small bowel loops with mild wall thickening of the distal  ileum. Consider enteritis including inflammatory and infectious etiology.  2. Persistent urinary bladder wall thickening with gas in the bladder. Aldana  catheter in good position.  3. Right adrenal lipid rich adenoma.  4. Right lower lobe atelectasis or pneumonia. Lung bases have improved  compared to 01/10/2024.       Lab Results   Component Value Date/Time    WBC 17.2 01/20/2024 06:50 AM    RBC 4.41 01/20/2024 06:50 AM    HGB 13.5 01/20/2024 06:50 AM    HCT 40.9 01/20/2024 06:50 AM    MCV 92.7 01/20/2024 06:50 AM    MCH 30.6 01/20/2024 06:50 AM    MCHC 33.0 01/20/2024 06:50 AM    RDW 12.9 01/20/2024 06:50 AM     01/20/2024 06:50 AM    MPV 10.8 01/20/2024 06:50 AM     Lab Results   Component Value Date/Time     01/20/2024 06:50 AM    K 2.9 01/20/2024 06:50 AM    CL 91 01/20/2024 06:50 AM    CO2 35 01/20/2024 06:50 AM    BUN 17 01/20/2024 06:50 AM    CREATININE 1.5 01/20/2024 06:50 AM    CALCIUM 8.3 
PROGRESS NOTE  By Jeffrey Charles, LUCAP    [facility]  Dr. Hernan Cavanaugh M.D.,  Dr. Sunny Soto M.D.,   Dr. Russ Landry D.SIN.,  Dr. Ramón Michel M.D.,  Dr. Win Hebert D.O.,          Aureliano Falcon  68 y.o.  male    SUBJECTIVE:  Patient resting in bed.  Awake and alert.  Attentive but minimally responsive.  Intake remains poor.    OBJECTIVE:    BP (!) 116/58   Pulse 87   Temp 98.3 °F (36.8 °C) (Oral)   Resp 24   Ht 1.626 m (5' 4.02\")   Wt 66.6 kg (146 lb 12.8 oz)   SpO2 91%   BMI 25.19 kg/m²     General: NAD/older adult male  HEENT: Appears to have no discharge from nose or ears however limited examination  Neck: Neck appears supple with trachea midline  Chest: Symmetric excursion/labored respirations  Cor: Regular  Abd.:Appears soft without guarding/tenderness  Extr.:  No significant peripheral edema  Skin: Warm and dry      DATA:    Monitor data reviewed -sinus rhythm noted.       Lab Results   Component Value Date/Time    WBC 14.0 01/24/2024 07:21 AM    RBC 4.14 01/24/2024 07:21 AM    HGB 12.5 01/24/2024 07:21 AM    HCT 39.0 01/24/2024 07:21 AM    MCV 94.2 01/24/2024 07:21 AM    MCH 30.2 01/24/2024 07:21 AM    MCHC 32.1 01/24/2024 07:21 AM    RDW 13.7 01/24/2024 07:21 AM     01/24/2024 07:21 AM    MPV 10.5 01/24/2024 07:21 AM     Lab Results   Component Value Date/Time     01/25/2024 06:39 AM    K 4.3 01/25/2024 06:39 AM     01/25/2024 06:39 AM    CO2 27 01/25/2024 06:39 AM    BUN 20 01/25/2024 06:39 AM    CREATININE 1.4 01/25/2024 06:39 AM    CALCIUM 9.1 01/25/2024 06:39 AM    PROT 6.3 01/25/2024 06:39 AM    LABALBU 2.8 01/25/2024 06:39 AM    BILITOT 0.6 01/25/2024 06:39 AM    ALKPHOS 146 01/25/2024 06:39 AM     01/25/2024 06:39 AM     01/25/2024 06:39 AM     Lab Results   Component Value Date/Time    LIPASE 23 01/08/2024 02:00 AM     No results found for: \"AMYLASE\"      ASSESSMENT/PLAN:  Patient Active Problem List   Diagnosis    Altered mental status 
PROGRESS NOTE  By Venancio Wisdom M.D.    [facility]  Dr. Hernan Cavanaugh M.D.,  Dr. Sunny Soto M.D.,   Dr. Russ Landry D.O.,  Dr. Ramón Michel M.D.,  Dr. Win Hebert D.O.,          Aureliano Falcon  68 y.o.  male    SUBJECTIVE:  As previously, virtually noncooperative with examination.  Does not volunteer complaints.  No family at bedside    OBJECTIVE:    /65   Pulse (!) 107   Temp 99.2 °F (37.3 °C) (Axillary)   Resp 20   Ht 1.626 m (5' 4.02\")   Wt 63.7 kg (140 lb 8 oz)   SpO2 91%   BMI 24.10 kg/m²     General: NAD/ male  HEENT: Limited exam secondary to noncompliance  Neck: Appears to be supple  Chest: Symmetric excursion/nonlabored respirations  Cor: Regular  Abd.: Soft and nondistended  Extr.:  No peripheral edema  Skin: Warm and dry      DATA:    Monitor data reviewed -sinus rhythm noted.       Lab Results   Component Value Date/Time    WBC 12.1 01/27/2024 07:05 AM    RBC 4.37 01/27/2024 07:05 AM    HGB 13.1 01/27/2024 07:05 AM    HCT 41.2 01/27/2024 07:05 AM    MCV 94.3 01/27/2024 07:05 AM    MCH 30.0 01/27/2024 07:05 AM    MCHC 31.8 01/27/2024 07:05 AM    RDW 14.2 01/27/2024 07:05 AM     01/27/2024 07:05 AM    MPV 10.8 01/27/2024 07:05 AM     Lab Results   Component Value Date/Time     01/28/2024 07:18 AM    K 3.9 01/28/2024 07:18 AM     01/28/2024 07:18 AM    CO2 25 01/28/2024 07:18 AM    BUN 26 01/28/2024 07:18 AM    CREATININE 1.3 01/28/2024 07:18 AM    CALCIUM 8.8 01/28/2024 07:18 AM    PROT 5.7 01/28/2024 07:18 AM    LABALBU 2.7 01/28/2024 07:18 AM    BILITOT 0.4 01/28/2024 07:18 AM    ALKPHOS 130 01/28/2024 07:18 AM    AST 53 01/28/2024 07:18 AM     01/28/2024 07:18 AM     Lab Results   Component Value Date/Time    LIPASE 23 01/08/2024 02:00 AM     No results found for: \"AMYLASE\"      ASSESSMENT/PLAN:  Patient Active Problem List   Diagnosis    Altered mental status     1.  Nausea vomiting/abnormal CT scan  -CT abdomen pelvis 1/17/2024 
PROGRESS NOTE  By Venancio Wisdom M.D.    [facility]  Dr. Hernan Cavanaugh M.D.,  Dr. Sunny Soto M.D.,   Dr. Russ Landry D.O.,  Dr. Ramón Michel M.D.,  Dr. Win Hebert D.O.,          Aureliano Falcon  68 y.o.  male    SUBJECTIVE:  Patient appears somewhat more awake.  This morning but fails to verbalize any complaints.  He appears to be denying abdominal pain however it is hard to understand.  No family present    OBJECTIVE:    /85   Pulse 83   Temp 97.4 °F (36.3 °C) (Oral)   Resp 20   Ht 1.626 m (5' 4.02\")   Wt 67.4 kg (148 lb 8 oz)   SpO2 95%   BMI 25.48 kg/m²     General: NAD/older adult male.  Marginally cooperative with exam  HEENT: Anicteric sclera/moist oral mucosa  Neck: Supple with trachea midline  Chest: CTAB/symmetric excursions  Cor: Regular  Abd.: Soft.  Appears nontender.  BS +  Extr.:  No peripheral edema  Skin: Warm and dry/anicteric      DATA:    Monitor data reviewed -sinus rhythm noted.       Lab Results   Component Value Date/Time    WBC 14.0 01/24/2024 07:21 AM    RBC 4.14 01/24/2024 07:21 AM    HGB 12.5 01/24/2024 07:21 AM    HCT 39.0 01/24/2024 07:21 AM    MCV 94.2 01/24/2024 07:21 AM    MCH 30.2 01/24/2024 07:21 AM    MCHC 32.1 01/24/2024 07:21 AM    RDW 13.7 01/24/2024 07:21 AM     01/24/2024 07:21 AM    MPV 10.5 01/24/2024 07:21 AM     Lab Results   Component Value Date/Time     01/24/2024 07:21 AM    K 3.9 01/24/2024 07:21 AM     01/24/2024 07:21 AM    CO2 29 01/24/2024 07:21 AM    BUN 20 01/24/2024 07:21 AM    CREATININE 1.4 01/24/2024 07:21 AM    CALCIUM 9.0 01/24/2024 07:21 AM    PROT 6.2 01/24/2024 07:21 AM    LABALBU 3.0 01/24/2024 07:21 AM    BILITOT 0.6 01/24/2024 07:21 AM    ALKPHOS 146 01/24/2024 07:21 AM     01/24/2024 07:21 AM     01/24/2024 07:21 AM     Lab Results   Component Value Date/Time    LIPASE 23 01/08/2024 02:00 AM     No results found for: \"AMYLASE\"      ASSESSMENT/PLAN:  Patient Active Problem List 
PROGRESS NOTE  By Venancio Wisdom M.D.    [facility]  Dr. Hernan Cavanaugh M.D.,  Dr. Sunny Soto M.D.,   Dr. Russ Landry D.O.,  Dr. Ramón Michel M.D.,  Dr. Win Hebert D.O.,          Aureliano Falcon  68 y.o.  male    SUBJECTIVE:  Patient appears to be in no acute distress but virtually does not participate in the exam    OBJECTIVE:    BP (!) 122/56   Pulse 81   Temp 98 °F (36.7 °C) (Axillary)   Resp 17   Ht 1.626 m (5' 4.02\")   Wt 70.4 kg (155 lb 4.8 oz)   SpO2 96%   BMI 26.64 kg/m²     General: NAD/adult male.  HEENT: Interval removal of NG tube  Neck: Trachea midline  Chest: CTAB  Cor: Regular  Abd.: Soft.  Appears nontender and nondistended.  BS +  Extr.:  No significant peripheral edema  Skin: Warm and dry/anicteric      DATA:    Monitor data reviewed       Lab Results   Component Value Date/Time    WBC 16.5 01/22/2024 06:14 AM    RBC 4.08 01/22/2024 06:14 AM    HGB 12.7 01/22/2024 06:14 AM    HCT 38.7 01/22/2024 06:14 AM    MCV 94.9 01/22/2024 06:14 AM    MCH 31.1 01/22/2024 06:14 AM    MCHC 32.8 01/22/2024 06:14 AM    RDW 13.3 01/22/2024 06:14 AM     01/22/2024 06:14 AM    MPV 10.5 01/22/2024 06:14 AM     Lab Results   Component Value Date/Time     01/22/2024 06:14 AM    K 3.5 01/22/2024 06:14 AM     01/22/2024 06:14 AM    CO2 33 01/22/2024 06:14 AM    BUN 24 01/22/2024 06:14 AM    CREATININE 1.4 01/22/2024 06:14 AM    CALCIUM 8.7 01/22/2024 06:14 AM    PROT 5.4 01/22/2024 06:14 AM    LABALBU 2.7 01/22/2024 06:14 AM    BILITOT 0.5 01/22/2024 06:14 AM    ALKPHOS 118 01/22/2024 06:14 AM    AST 89 01/22/2024 06:14 AM     01/22/2024 06:14 AM     Lab Results   Component Value Date/Time    LIPASE 23 01/08/2024 02:00 AM     No results found for: \"AMYLASE\"      ASSESSMENT/PLAN:  Patient Active Problem List   Diagnosis    Altered mental status     1.  Nausea vomiting/abnormal CT scan  -CT abdomen pelvis 1/17/2024 showing fluid-filled small bowel loops, prominent wall 
PROGRESS NOTE  By Venancio Wisdom M.D.    [facility]  Dr. Hernan Cavanaugh M.D.,  Dr. Sunny Soto M.D.,   Dr. Russ Landry D.O.,  Dr. Ramón Michel M.D.,  Dr. Win Hebert D.O.,          Aureliano Falcon  68 y.o.  male    SUBJECTIVE:  Patient remains nonverbal does not cooperate with examination.  He is pulling the blanket over his head and at this time physical evaluation is limited.  Patient does not verbalize any complaints.  No family is present at bedside    OBJECTIVE:    /69   Pulse 97   Temp 98.5 °F (36.9 °C) (Oral)   Resp 22   Ht 1.626 m (5' 4.02\")   Wt 68.6 kg (151 lb 3.2 oz)   SpO2 98%   BMI 25.94 kg/m²     General: NAD/ male  Neck: Trachea appears midline  Chest: Symmetric excursion/CTAB  Cor: Regular  Abd.: Soft/appears nontender  Extr.:  No peripheral edema  Skin: Warm and dry      DATA:    Monitor data reviewed -sinus rhythm noted.       Lab Results   Component Value Date/Time    WBC 17.7 01/23/2024 06:12 AM    RBC 3.83 01/23/2024 06:12 AM    HGB 11.6 01/23/2024 06:12 AM    HCT 36.0 01/23/2024 06:12 AM    MCV 94.0 01/23/2024 06:12 AM    MCH 30.3 01/23/2024 06:12 AM    MCHC 32.2 01/23/2024 06:12 AM    RDW 13.8 01/23/2024 06:12 AM     01/23/2024 06:12 AM    MPV 10.8 01/23/2024 06:12 AM     Lab Results   Component Value Date/Time     01/23/2024 06:12 AM    K 3.9 01/23/2024 06:12 AM     01/23/2024 06:12 AM    CO2 31 01/23/2024 06:12 AM    BUN 22 01/23/2024 06:12 AM    CREATININE 1.4 01/23/2024 06:12 AM    CALCIUM 8.5 01/23/2024 06:12 AM    PROT 5.6 01/23/2024 06:12 AM    LABALBU 3.0 01/23/2024 06:12 AM    BILITOT 0.5 01/23/2024 06:12 AM    ALKPHOS 132 01/23/2024 06:12 AM     01/23/2024 06:12 AM     01/23/2024 06:12 AM     Lab Results   Component Value Date/Time    LIPASE 23 01/08/2024 02:00 AM     No results found for: \"AMYLASE\"      ASSESSMENT/PLAN:  Patient Active Problem List   Diagnosis    Altered mental status       PROGRESS NOTE  By 
PROGRESS NOTE  By Venancio Wisdom M.D.    [facility]  Dr. Hernan Cavanaugh M.D.,  Dr. Sunny Soto M.D.,   Dr. Russ Landry D.O.,  Dr. Ramón Michel M.D.,  Dr. Win Hebert D.O.,          Aureliano Falcon  68 y.o.  male    SUBJECTIVE:  Virtually nonverbal but significantly more awake and currently participating in being fed by nursing.    OBJECTIVE:    /67   Pulse 96   Temp 97.9 °F (36.6 °C) (Axillary)   Resp 18   Ht 1.626 m (5' 4.02\")   Wt 62.6 kg (138 lb 1.6 oz)   SpO2 93%   BMI 23.69 kg/m²     General: NAD/ male  HEENT: Icteric sclera/moist oral mucosa  Neck: Appears supple with trachea midline  Chest: Symmetric excursion/nonlabored respirations  Abd.: Soft and appears nontender  Extr.:  No peripheral edema  Skin: Warm and dry      DATA:    Lab Results   Component Value Date/Time    WBC 14.0 01/24/2024 07:21 AM    RBC 4.14 01/24/2024 07:21 AM    HGB 12.5 01/24/2024 07:21 AM    HCT 39.0 01/24/2024 07:21 AM    MCV 94.2 01/24/2024 07:21 AM    MCH 30.2 01/24/2024 07:21 AM    MCHC 32.1 01/24/2024 07:21 AM    RDW 13.7 01/24/2024 07:21 AM     01/24/2024 07:21 AM    MPV 10.5 01/24/2024 07:21 AM     Lab Results   Component Value Date/Time     01/26/2024 07:11 AM    K 3.8 01/26/2024 07:11 AM     01/26/2024 07:11 AM    CO2 28 01/26/2024 07:11 AM    BUN 27 01/26/2024 07:11 AM    CREATININE 1.4 01/26/2024 07:11 AM    CALCIUM 8.6 01/26/2024 07:11 AM    PROT 6.0 01/26/2024 07:11 AM    LABALBU 3.1 01/26/2024 07:11 AM    BILITOT 0.5 01/26/2024 07:11 AM    ALKPHOS 133 01/26/2024 07:11 AM    AST 73 01/26/2024 07:11 AM     01/26/2024 07:11 AM     Lab Results   Component Value Date/Time    LIPASE 23 01/08/2024 02:00 AM     No results found for: \"AMYLASE\"      ASSESSMENT/PLAN:  Patient Active Problem List   Diagnosis    Altered mental status     1.  Nausea vomiting/abnormal CT scan  -CT abdomen pelvis 1/17/2024 showing fluid-filled small bowel loops, prominent wall distal and 
PROGRESS NOTE  By Venancio Wisdom M.D.    [facility]  Dr. Hernan Cavanaugh M.D.,  Dr. Sunny Soto M.D.,   Dr. Russ Landry D.O.,  Dr. Ramón Michel M.D.,  PRIETO KohliO.,          Aureliano Falcon  68 y.o.  male    SUBJECTIVE:  Virtually nonverbal.  No family at bedside    OBJECTIVE:    /74   Pulse 73   Temp 97.9 °F (36.6 °C) (Axillary)   Resp (!) 32   Ht 1.626 m (5' 4\")   Wt 69.6 kg (153 lb 6.4 oz)   SpO2 90%   BMI 26.33 kg/m²     General: NAD/adult male.  Does not participate in exam  HEENT: NG tube in place  Neck: Appears supple with trachea midline  Chest: Symmetrical excursion/nonlabored respirations  Cor: Regular  Abd.: Soft.  Appears soft and nontender.  BS +  Extr.:  No significant peripheral edema  Skin: Warm and dry/anicteric    DATA:    Monitor data reviewed -sinus rhythm noted.       Lab Results   Component Value Date/Time    WBC 14.7 01/15/2024 08:33 AM    RBC 4.36 01/15/2024 08:33 AM    HGB 13.3 01/15/2024 08:33 AM    HCT 42.0 01/15/2024 08:33 AM    MCV 96.3 01/15/2024 08:33 AM    MCH 30.5 01/15/2024 08:33 AM    MCHC 31.7 01/15/2024 08:33 AM    RDW 14.8 01/15/2024 08:33 AM     01/15/2024 08:33 AM    MPV 11.3 01/15/2024 08:33 AM     Lab Results   Component Value Date/Time     01/14/2024 09:36 AM    K 4.5 01/14/2024 09:36 AM     01/14/2024 09:36 AM    CO2 23 01/14/2024 09:36 AM    BUN 42 01/14/2024 09:36 AM    CREATININE 1.5 01/14/2024 09:36 AM    CALCIUM 8.9 01/14/2024 09:36 AM    PROT 5.9 01/14/2024 09:36 AM    LABALBU 2.7 01/14/2024 09:36 AM    BILITOT 1.0 01/14/2024 09:36 AM    ALKPHOS 117 01/14/2024 09:36 AM    AST 61 01/14/2024 09:36 AM    ALT 34 01/14/2024 09:36 AM     Lab Results   Component Value Date/Time    LIPASE 23 01/08/2024 02:00 AM     No results found for: \"AMYLASE\"      ASSESSMENT/PLAN:  Patient Active Problem List   Diagnosis    Altered mental status     1.  Nausea vomiting/abnormal CT scan  -Questionable dysmotility versus esophageal 
PROGRESS NOTE  By Venancio Wisdom M.D.    [facility]  Dr. Hernan Cavanaugh M.D.,  Dr. Sunny Soto M.D.,   HOWIE Barnett.O.,  Dr. Ramón Michel M.D.,  PRIETO KohliO.,          Aureliano Falcon  68 y.o.  male    SUBJECTIVE:  Does not voice complaints.  Sitter at bedside.  No family present.    OBJECTIVE:    BP (!) 155/72   Pulse 75   Temp 98.2 °F (36.8 °C) (Axillary)   Resp 26   Ht 1.626 m (5' 4.02\")   Wt 69.9 kg (154 lb 1.6 oz)   SpO2 93%   BMI 26.44 kg/m²     General: NAD/adult male  HEENT: NG tube in place with scant brown secretions  Neck: Trachea midline/no JVD  Chest: CTAB/symmetric excursions.  Nasal cannula 7 to 8 L supplemental oxygen  Cor: Regular/S1-S2  Abd.: Soft and obese.  Bowel sounds present in all 4 quadrants  Extr.:  No significant peripheral edema  Skin: Warm and dry    DATA:    Monitor data reviewed -sinus rhythm noted.       Lab Results   Component Value Date/Time    WBC 14.7 01/15/2024 08:33 AM    RBC 4.36 01/15/2024 08:33 AM    HGB 13.3 01/15/2024 08:33 AM    HCT 42.0 01/15/2024 08:33 AM    MCV 96.3 01/15/2024 08:33 AM    MCH 30.5 01/15/2024 08:33 AM    MCHC 31.7 01/15/2024 08:33 AM    RDW 14.8 01/15/2024 08:33 AM     01/15/2024 08:33 AM    MPV 11.3 01/15/2024 08:33 AM     Lab Results   Component Value Date/Time     01/15/2024 08:33 AM    K 4.2 01/15/2024 08:33 AM     01/15/2024 08:33 AM    CO2 24 01/15/2024 08:33 AM    BUN 37 01/15/2024 08:33 AM    CREATININE 1.3 01/15/2024 08:33 AM    CALCIUM 8.8 01/15/2024 08:33 AM    PROT 5.9 01/15/2024 08:33 AM    LABALBU 2.6 01/15/2024 08:33 AM    BILITOT 1.0 01/15/2024 08:33 AM    ALKPHOS 120 01/15/2024 08:33 AM     01/15/2024 08:33 AM    ALT 90 01/15/2024 08:33 AM     Lab Results   Component Value Date/Time    LIPASE 23 01/08/2024 02:00 AM     No results found for: \"AMYLASE\"      ASSESSMENT/PLAN:  Patient Active Problem List   Diagnosis    Altered mental status     1.  Nausea vomiting/abnormal CT 
PROGRESS NOTE  By Venancio Wisdom M.D.    [facility]  Dr. Hernan Cavanaugh M.D.,  Dr. Sunny Soto M.D.,   HOWIE Barnett.O.,  Dr. Ramón Michel M.D.,  PRIETO KohliO.,          Aureliano Falcon  68 y.o.  male    SUBJECTIVE:  Remains virtually nonverbal.  Sitter at bedside but no family.    OBJECTIVE:    BP (!) 120/57   Pulse (!) 111   Temp 98 °F (36.7 °C) (Axillary)   Resp 19   Ht 1.626 m (5' 4.02\")   Wt 71.2 kg (157 lb 1 oz)   SpO2 95%   BMI 26.95 kg/m²     General: Somnolent/obtunded.  NAD.  HEENT: NG tube in place -dark secretion without coffee-ground material or blood  Neck: Supple/trachea is midline  Chest: CTAB.  Persistent supplemental oxygen via NC  Cor: Regular  Abd.: Soft.  Appears nontender.  Bowel sounds in all 4 quadrants.  Extr.:  No significant peripheral edema  Skin: Warm and dry      DATA:    Monitor data reviewed -sinus rhythm noted.    CT scan abdomen/pelvis:  1. Fluid-filled small bowel loops with mild wall thickening of the distal  ileum. Consider enteritis including inflammatory and infectious etiology.  2. Persistent urinary bladder wall thickening with gas in the bladder. Aldana  catheter in good position.  3. Right adrenal lipid rich adenoma.  4. Right lower lobe atelectasis or pneumonia. Lung bases have improved  compared to 01/10/2024.       Lab Results   Component Value Date/Time    WBC 13.4 01/18/2024 07:21 AM    RBC 4.66 01/18/2024 07:21 AM    HGB 14.4 01/18/2024 07:21 AM    HCT 42.8 01/18/2024 07:21 AM    MCV 91.8 01/18/2024 07:21 AM    MCH 30.9 01/18/2024 07:21 AM    MCHC 33.6 01/18/2024 07:21 AM    RDW 13.1 01/18/2024 07:21 AM     01/18/2024 07:21 AM    MPV 10.6 01/18/2024 07:21 AM     Lab Results   Component Value Date/Time     01/18/2024 07:21 AM    K 2.9 01/18/2024 07:21 AM    CL 91 01/18/2024 07:21 AM    CO2 37 01/18/2024 07:21 AM    BUN 20 01/18/2024 07:21 AM    CREATININE 1.0 01/18/2024 07:21 AM    CALCIUM 8.6 01/18/2024 07:21 AM    
PROGRESS NOTE  By Venancio Wisdom M.D.    [facility]  Dr. Hernan Cavanaugh M.D.,  Dr. Sunny Soto M.D.,   PRIETO BarnettO.,  Dr. Ramón Michel M.D.,  Dr. Win Hebert D.O.,          Aureliano Falcon  68 y.o.  male    SUBJECTIVE:  No new complaints.  No family at bedside    OBJECTIVE:    BP (!) 150/76   Pulse 91   Temp 97 °F (36.1 °C) (Axillary)   Resp 25   Ht 1.626 m (5' 4.02\")   Wt 61 kg (134 lb 6.4 oz)   SpO2 90%   BMI 23.06 kg/m²     General: NAD/ male  HEENT: Anicteric sclera/moist oral mucosa  Neck: Supple/trachea midline  Chest: Symmetrical excursion/nonlabored respirations  Cor: Regular  Abd.: Soft.  Nontender and nondistended  Extr.:  No peripheral edema  Skin: Warm and dry      DATA:    Monitor data reviewed -sinus rhythm noted.       Lab Results   Component Value Date/Time    WBC 12.1 01/27/2024 07:05 AM    RBC 4.37 01/27/2024 07:05 AM    HGB 13.1 01/27/2024 07:05 AM    HCT 41.2 01/27/2024 07:05 AM    MCV 94.3 01/27/2024 07:05 AM    MCH 30.0 01/27/2024 07:05 AM    MCHC 31.8 01/27/2024 07:05 AM    RDW 14.2 01/27/2024 07:05 AM     01/27/2024 07:05 AM    MPV 10.8 01/27/2024 07:05 AM     Lab Results   Component Value Date/Time     01/29/2024 05:44 AM    K 3.7 01/29/2024 05:44 AM     01/29/2024 05:44 AM    CO2 28 01/29/2024 05:44 AM    BUN 26 01/29/2024 05:44 AM    CREATININE 1.2 01/29/2024 05:44 AM    CALCIUM 8.7 01/29/2024 05:44 AM    PROT 5.9 01/29/2024 05:44 AM    LABALBU 3.0 01/29/2024 05:44 AM    BILITOT 0.4 01/29/2024 05:44 AM    ALKPHOS 115 01/29/2024 05:44 AM    AST 44 01/29/2024 05:44 AM     01/29/2024 05:44 AM     Lab Results   Component Value Date/Time    LIPASE 23 01/08/2024 02:00 AM     No results found for: \"AMYLASE\"      ASSESSMENT/PLAN:  Patient Active Problem List   Diagnosis    Altered mental status     1.  Nausea vomiting/abnormal CT scan  -CT abdomen pelvis 1/17/2024 showing fluid-filled small bowel loops, prominent wall distal and 
PROGRESS NOTE  By Venancio Wisdom M.D.    [facility]  Dr. Hernan Cavanaugh M.D.,  Dr. Sunny Soto M.D.,   PRIETO BarnettO.,  Dr. Ramón Michel M.D.,  Dr. Win Hebert D.O.,          Aureliano Falcon  68 y.o.  male    SUBJECTIVE:  Remains virtually nonverbal.  No family at bedside.  Sitter at bedside    OBJECTIVE:    BP (!) 153/97   Pulse 91   Temp 99.4 °F (37.4 °C) (Axillary)   Resp 24   Ht 1.626 m (5' 4.02\")   Wt 71.2 kg (157 lb 1 oz)   SpO2 99%   BMI 26.95 kg/m²     General:  male.  NAD  HEENT: NG tube in place with greenish-brownish secretion without evidence of overt bleed or clear evidence of coffee-ground material.  Neck: Trachea midline/no JVD  Chest: Symmetrical excursion/no wheezing  Cor: Regular/S1-S2  Abd.: Soft.  Appears nontender.  Bowel sounds hypoactive.  Extr.:  No significant peripheral edema.  Decreased muscle tone and bulk, consistent with age and condition  Skin: Warm and dry      DATA:    Monitor data reviewed -sinus rhythm noted.       Lab Results   Component Value Date/Time    WBC 11.0 01/16/2024 02:31 PM    RBC 4.26 01/16/2024 02:31 PM    HGB 13.0 01/16/2024 02:31 PM    HCT 40.5 01/16/2024 02:31 PM    MCV 95.1 01/16/2024 02:31 PM    MCH 30.5 01/16/2024 02:31 PM    MCHC 32.1 01/16/2024 02:31 PM    RDW 13.9 01/16/2024 02:31 PM     01/16/2024 02:31 PM    MPV 10.5 01/16/2024 02:31 PM     Lab Results   Component Value Date/Time     01/16/2024 02:31 PM    K 4.1 01/16/2024 02:31 PM     01/16/2024 02:31 PM    CO2 30 01/16/2024 02:31 PM    BUN 29 01/16/2024 02:31 PM    CREATININE 1.1 01/16/2024 02:31 PM    CALCIUM 8.3 01/16/2024 02:31 PM    PROT 5.5 01/16/2024 02:31 PM    LABALBU 2.6 01/16/2024 02:31 PM    BILITOT 0.8 01/16/2024 02:31 PM    ALKPHOS 110 01/16/2024 02:31 PM    AST 77 01/16/2024 02:31 PM     01/16/2024 02:31 PM     Lab Results   Component Value Date/Time    LIPASE 23 01/08/2024 02:00 AM     No results found for: 
Patient discharged to [facility]. IV removed and intact. Aldana remained in place per provider order for urinary retention. Patient left floor via stretcher with PAS at 2206 in [facility] and all belongings.   
Patient has temp 102.9 at this time. Ice packs applied to nape of neck and bilateral axilla. Tylenol suppository given per PRN order. Will continue to monitor for improvement in temperature.  
Patient is drowsy post geodon IM earlier this am. Does arouse with minimal speaking with nursing. Sitter 1:1 noted at bedside. Patient is unable to take medications at this time due to emesis post swallow. Noted to be coughing and choking on water and then the emesis of water and medications. Patient made NPO and orders received for speech therapy to evaluate and treat from Dr Gallo.  
Pharmacy Consultation Note  (Antibiotic Dosing and Monitoring)    Initial consult date: 1/12/24  Consulting physician/provider: Dr. Deng  Drug: Vancomycin  Indication: Pneumonia    Age/  Gender Height Weight IBW  Allergy Information   68 y.o./male 162.6 cm (5' 4\") 72.6 kg (160 lb)     Ideal body weight: 59.2 kg (130 lb 8.2 oz)  Adjusted ideal body weight: 63.6 kg (140 lb 2.4 oz)   Patient has no allergy information on record.      Renal Function:  Recent Labs     01/11/24  0829 01/12/24  0507 01/13/24  0613   BUN 33* 47* 41*   CREATININE 1.3* 1.7* 1.5*         Intake/Output Summary (Last 24 hours) at 1/13/2024 1433  Last data filed at 1/13/2024 0839  Gross per 24 hour   Intake 10 ml   Output 700 ml   Net -690 ml         Vancomycin Monitoring:  Trough:  No results for input(s): \"VANCOTROUGH\" in the last 72 hours.  Random:    Recent Labs     01/13/24  1239   VANCORANDOM 11.4       Vancomycin Administration Times:  Recent vancomycin administrations                     vancomycin (VANCOCIN) 1,500 mg in sodium chloride 0.9 % 250 mL IVPB (mg) 1,500 mg New Bag 01/12/24 9761                    Assessment:  Patient is a 68 y.o. male who has been initiated on vancomycin  Estimated Creatinine Clearance: 41 mL/min (A) (based on SCr of 1.5 mg/dL (H)). Patient's baseline SCr is 1.0.  To dose vancomycin, pharmacy will be utilizing dosing based off of levels because of patient's renal impairment/insufficiency  1/13: Random level @ 1239 = 11.4 mcg/mL    Plan:  Re-dose with Vancomycin 1,000 mg IV x 1  Random level tomorrow morning  Dose by levels  Will continue to monitor renal function   Pharmacy to follow      Genevieve Buckley, PharmD, BCPS 1/13/2024 2:33 PM   [phone]     
Pharmacy Consultation Note  (Antibiotic Dosing and Monitoring)    Initial consult date: 1/12/24  Consulting physician/provider: Dr. Deng  Drug: Vancomycin  Indication: Pneumonia    Age/  Gender Height Weight IBW  Allergy Information   68 y.o./male 162.6 cm (5' 4\") 72.6 kg (160 lb)     Ideal body weight: 59.2 kg (130 lb 8.2 oz)  Adjusted ideal body weight: 63.6 kg (140 lb 2.4 oz)   Patient has no allergy information on record.      Renal Function:  Recent Labs     01/12/24  0507 01/13/24  0613 01/14/24  0936   BUN 47* 41* 42*   CREATININE 1.7* 1.5* 1.5*         Intake/Output Summary (Last 24 hours) at 1/14/2024 1223  Last data filed at 1/14/2024 0742  Gross per 24 hour   Intake 0 ml   Output 2300 ml   Net -2300 ml         Vancomycin Monitoring:  Trough:  No results for input(s): \"VANCOTROUGH\" in the last 72 hours.  Random:    Recent Labs     01/13/24  1239 01/14/24  0936   VANCORANDOM 11.4 9.5         Vancomycin Administration Times:  Recent vancomycin administrations                     vancomycin (VANCOCIN) 1,000 mg in sodium chloride 0.9 % 250 mL IVPB ([id number]) (mg) 1,000 mg New Bag 01/13/24 1806    vancomycin (VANCOCIN) 1,500 mg in sodium chloride 0.9 % 250 mL IVPB (mg) 1,500 mg New Bag 01/12/24 2347                      Assessment:  Patient is a 68 y.o. male who has been initiated on vancomycin  Estimated Creatinine Clearance: 41 mL/min (A) (based on SCr of 1.5 mg/dL (H)). Patient's baseline SCr is 1.0.  To dose vancomycin, pharmacy will be utilizing dosing based off of levels because of patient's renal impairment/insufficiency  1/13: Random level @ 1239 = 11.4 mcg/mL    Plan:  Re-dose with Vancomycin 1,000 mg IV x 1  Random level tomorrow morning  Dose by levels  Will continue to monitor renal function   Pharmacy to follow      Genevieve Buckley, PharmD, BCPS 1/14/2024 12:23 PM   [phone]     
Pharmacy Consultation Note  (Antibiotic Dosing and Monitoring)    Initial consult date: 1/12/24  Consulting physician/provider: Dr. Deng  Drug: Vancomycin  Indication: Pneumonia    Age/  Gender Height Weight IBW  Allergy Information   68 y.o./male 162.6 cm (5' 4\") 72.6 kg (160 lb)     Ideal body weight: 59.2 kg (130 lb 8.2 oz)  Adjusted ideal body weight: 63.9 kg (140 lb 14.5 oz)   Patient has no allergy information on record.      Renal Function:  Recent Labs     01/10/24  0827 01/11/24  0829 01/12/24  0507   BUN 21 33* 47*   CREATININE 1.1 1.3* 1.7*       Intake/Output Summary (Last 24 hours) at 1/12/2024 1815  Last data filed at 1/12/2024 1524  Gross per 24 hour   Intake 0 ml   Output 2550 ml   Net -2550 ml       Vancomycin Monitoring:  Trough:  No results for input(s): \"VANCOTROUGH\" in the last 72 hours.  Random:  No results for input(s): \"VANCORANDOM\" in the last 72 hours.    Vancomycin Administration Times:  Recent vancomycin administrations        No vancomycin IV orders with administrations found.                    Assessment:  Patient is a 68 y.o. male who has been initiated on vancomycin  Estimated Creatinine Clearance: 36 mL/min (A) (based on SCr of 1.7 mg/dL (H)). Patient's baseline SCr is 1.0.  To dose vancomycin, pharmacy will be utilizing dosing based off of levels because of patient's renal impairment/insufficiency    Plan:  Vancomycin 1,500 mg IV x 1  Random level tomorrow @ 1200  Dose by levels  Will continue to monitor renal function   Pharmacy to follow      Genevieve Buckley, PharmD, BCPS 1/12/2024 6:16 PM   [phone]     
Pharmacy Consultation Note  (Antibiotic Dosing and Monitoring)    Initial consult date: 1/12/24  Consulting physician/provider: Dr. Deng  Drug: Vancomycin  Indication: Pneumonia    Age/  Gender Height Weight IBW  Allergy Information   68 y.o./male 162.6 cm (5' 4\") 72.6 kg (160 lb)     Ideal body weight: 59.2 kg (130 lb 9.5 oz)  Adjusted ideal body weight: 63.4 kg (139 lb 11.5 oz)   Patient has no allergy information on record.      Renal Function:  Recent Labs     01/13/24  0613 01/14/24  0936 01/15/24  0833   BUN 41* 42* 37*   CREATININE 1.5* 1.5* 1.3*         Intake/Output Summary (Last 24 hours) at 1/15/2024 1129  Last data filed at 1/15/2024 1101  Gross per 24 hour   Intake 120 ml   Output 2500 ml   Net -2380 ml         Vancomycin Monitoring:  Trough:  No results for input(s): \"VANCOTROUGH\" in the last 72 hours.  Random:    Recent Labs     01/13/24  1239 01/14/24  0936 01/15/24  0833   VANCORANDOM 11.4 9.5 9.5       Vancomycin Administration Times:  Recent vancomycin administrations                     vancomycin (VANCOCIN) 1,000 mg in sodium chloride 0.9 % 250 mL IVPB ([id number]) (mg) 1,000 mg New Bag 01/14/24 1646    vancomycin (VANCOCIN) 1,000 mg in sodium chloride 0.9 % 250 mL IVPB ([id number]) (mg) 1,000 mg New Bag 01/13/24 1806    vancomycin (VANCOCIN) 1,500 mg in sodium chloride 0.9 % 250 mL IVPB (mg) 1,500 mg New Bag 01/12/24 2347               Assessment:  Patient is a 68 y.o. male who has been initiated on vancomycin  Estimated Creatinine Clearance: 47 mL/min (A) (based on SCr of 1.3 mg/dL (H)). Patient's baseline SCr is 1.0.  To dose vancomycin, pharmacy will be utilizing dosing based off of levels because of patient's renal impairment/insufficiency  1/13: Random level @ 1239 = 11.4 mcg/mL  1/14: Random morning level = 9.5 mcg/mL  1/15: Random morning level = 9.5 mcg/mL    Plan:  Vancomycin 1250 mg IV x 1  Random level tomorrow morning  Dose by levels  Will continue to monitor renal function   Pharmacy 
Pharmacy Consultation Note  (Antibiotic Dosing and Monitoring)    Initial consult date: 1/12/24  Consulting physician/provider: Dr. Deng  Drug: Vancomycin  Indication: Pneumonia    Age/  Gender Height Weight IBW  Allergy Information   68 y.o./male 162.6 cm (5' 4\") 72.6 kg (160 lb)     Ideal body weight: 59.2 kg (130 lb 9.5 oz)  Adjusted ideal body weight: 63.5 kg (139 lb 15.9 oz)   Patient has no allergy information on record.      Renal Function:  Recent Labs     01/14/24  0936 01/15/24  0833 01/16/24  1431   BUN 42* 37* 29*   CREATININE 1.5* 1.3* 1.1         Intake/Output Summary (Last 24 hours) at 1/16/2024 1610  Last data filed at 1/16/2024 1354  Gross per 24 hour   Intake 5802.51 ml   Output 2550 ml   Net 3252.51 ml         Vancomycin Monitoring:  Trough:  No results for input(s): \"VANCOTROUGH\" in the last 72 hours.  Random:    Recent Labs     01/14/24  0936 01/15/24  0833 01/16/24  1431   VANCORANDOM 9.5 9.5 6.6       Vancomycin Administration Times:  Recent vancomycin administrations                     vancomycin (VANCOCIN) 1,000 mg in sodium chloride 0.9 % 250 mL IVPB ([id number]) (mg) 1,000 mg New Bag 01/14/24 1646    vancomycin (VANCOCIN) 1,000 mg in sodium chloride 0.9 % 250 mL IVPB ([id number]) (mg) 1,000 mg New Bag 01/13/24 1806    vancomycin (VANCOCIN) 1,500 mg in sodium chloride 0.9 % 250 mL IVPB (mg) 1,500 mg New Bag 01/12/24 2347               Assessment:  Patient is a 68 y.o. male who has been initiated on vancomycin  Estimated Creatinine Clearance: 55 mL/min (based on SCr of 1.1 mg/dL). Patient's baseline SCr is 1.0.  To dose vancomycin, pharmacy will be utilizing dosing based off of levels because of patient's renal impairment/insufficiency  1/13: Random level @ 1239 = 11.4 mcg/mL  1/14: Random morning level = 9.5 mcg/mL  1/15: Random morning level = 9.5 mcg/mL  1/16: Random morning level not collected. Stat level ordered @ 1300. Level @ 1431 = 6.6 mcg/mL    Plan:  Trial vancomycin 1250 mg IV 
Pharmacy Consultation Note  (Antibiotic Dosing and Monitoring)    Initial consult date: 1/12/24  Consulting physician/provider: Dr. Deng  Drug: Vancomycin  Indication: Pneumonia    Age/  Gender Height Weight IBW  Allergy Information   68 y.o./male 162.6 cm (5' 4\") 72.6 kg (160 lb)     Ideal body weight: 59.2 kg (130 lb 9.5 oz)  Adjusted ideal body weight: 64 kg (141 lb 2.9 oz)   Patient has no allergy information on record.      Renal Function:  Recent Labs     01/15/24  0833 01/16/24  1431   BUN 37* 29*   CREATININE 1.3* 1.1         Intake/Output Summary (Last 24 hours) at 1/17/2024 1417  Last data filed at 1/17/2024 1231  Gross per 24 hour   Intake 3334.66 ml   Output 3450 ml   Net -115.34 ml         Vancomycin Monitoring:  Trough:  No results for input(s): \"VANCOTROUGH\" in the last 72 hours.  Random:    Recent Labs     01/15/24  0833 01/16/24  1431   VANCORANDOM 9.5 6.6       Vancomycin Administration Times:  Recent vancomycin administrations                     vancomycin (VANCOCIN) 1,250 mg in sodium chloride 0.9 % 250 mL IVPB (mg) 1,250 mg New Bag 01/16/24 2230    vancomycin (VANCOCIN) 1,250 mg in sodium chloride 0.9 % 250 mL IVPB (mg) 1,250 mg New Bag 01/15/24 1352    vancomycin (VANCOCIN) 1,000 mg in sodium chloride 0.9 % 250 mL IVPB ([id number]) (mg) 1,000 mg New Bag 01/14/24 1646                    Assessment:  Patient is a 68 y.o. male who has been initiated on vancomycin  Estimated Creatinine Clearance: 60 mL/min (based on SCr of 1.1 mg/dL). Patient's baseline SCr is 1.0.  To dose vancomycin, pharmacy will be utilizing dosing based off of levels because of patient's renal impairment/insufficiency  1/13: Random level @ 1239 = 11.4 mcg/mL  1/14: Random morning level = 9.5 mcg/mL  1/15: Random morning level = 9.5 mcg/mL  1/16: Random morning level not collected. Stat level ordered @ 1300. Level @ 1431 = 6.6 mcg/mL    Plan:  Vancomycin 1250 mg IV Q18H  Random level tomorrow with morning labs  Will continue 
Pharmacy Consultation Note  (Antibiotic Dosing and Monitoring)    Initial consult date: 1/12/24  Consulting physician/provider: Dr. Deng  Drug: Vancomycin  Indication: Pneumonia    Age/  Gender Height Weight IBW  Allergy Information   68 y.o./male 162.6 cm (5' 4\") 72.6 kg (160 lb)     Ideal body weight: 59.2 kg (130 lb 9.5 oz)  Adjusted ideal body weight: 64 kg (141 lb 2.9 oz)   Patient has no allergy information on record.      Renal Function:  Recent Labs     01/16/24  1431 01/18/24  0721   BUN 29* 20   CREATININE 1.1 1.0         Intake/Output Summary (Last 24 hours) at 1/18/2024 1443  Last data filed at 1/18/2024 0946  Gross per 24 hour   Intake 2610.06 ml   Output 2725 ml   Net -114.94 ml         Vancomycin Monitoring:  Trough:  No results for input(s): \"VANCOTROUGH\" in the last 72 hours.  Random:    Recent Labs     01/16/24  1431 01/18/24  0721   VANCORANDOM 6.6 13.3       Vancomycin Administration Times:  Recent vancomycin administrations                     vancomycin (VANCOCIN) 1,250 mg in sodium chloride 0.9 % 250 mL IVPB (mg) 1,250 mg New Bag 01/18/24 0944     1,250 mg New Bag 01/17/24 1837     1,250 mg New Bag 01/16/24 2230                      Assessment:  Patient is a 68 y.o. male who has been initiated on vancomycin  Estimated Creatinine Clearance: 66 mL/min (based on SCr of 1 mg/dL). Patient's baseline SCr is 1.0.  To dose vancomycin, pharmacy will be utilizing dosing based off of levels because of patient's renal impairment/insufficiency  1/13: Random level @ 1239 = 11.4 mcg/mL  1/14: Random morning level = 9.5 mcg/mL  1/15: Random morning level = 9.5 mcg/mL  1/16: Random morning level not collected. Stat level ordered @ 1300. Level @ 1431 = 6.6 mcg/mL  1/18: Random AM level = 13.3 mcg/mL, AUC/AMBER 480    Plan:  Continue Vancomycin 1250 mg IV Q18H  Will continue to monitor renal function   Pharmacy to follow      Vanessa VriamontesD, BCPS 1/18/2024 2:43 PM   [phone]   
Pharmacy Consultation Note  (Antibiotic Dosing and Monitoring)    Vancomycin has been completed; pharmacy will sign-off.  Please reconsult if needed.    Thank you,  Jessica Hickey, PharmD, BCPS 1/19/2024 8:53 AM     
Physical Therapy      Physical Therapy    Room #:   0631/0631-01    Date: 1/10/2024       Patient Name: Aureliano Falcon  : 1955      MRN: [id number]     Patient unavailable for physical therapy treatment due to refusal. Patient take verbal and tactile cues to wake up and refused all movement this AM. Will attempt PT treatment at a later time. Thank you.      Kita Curtis, [facility] [place]    #[id number]  
Physical Therapy      Physical Therapy    Room #:   0631/0631-01    Date: 2024       Patient Name: Aureliano Falcon  : 1955      MRN: [id number]     Patient unavailable for physical therapy treatment due to refusal. Patient encouraged to move out of bed to chair, however patient took sheet and covered up his head instead. Will attempt PT treatment at a later time. Thank you.      Kita Curtis, PTA    #[id number]  
Physical Therapy      Physical Therapy    Room #:   0631/0631-01    Date: 2024       Patient Name: Aureliano Falcon  : 1955      MRN: [id number]     Patient unavailable for physical therapy treatment due to  patient sleeping with blanket over his head and doesn't wake to verbal and tactile stimuli . Will attempt PT treatment at a later time. Thank you.      Kita Curtis, [facility]  #[id number]    
Physical Therapy      Physical Therapy    Room #:   0631/0631-01    Date: 2024       Patient Name: Aureliano Falcon  : 1955      MRN: [id number]     Patient unavailable for physical therapy treatment due to  patient sleeping soundly with blanket over head . Patient does not wake with blanket being pulled down or verbal stimuli. Will attempt PT treatment at a later time. Thank you.      Kita Curtis, PTA    #[id number]  
Physical Therapy      Physical Therapy    Room #:   0631/0631-01    Date: 2024       Patient Name: Aureliano Falcon  : 1955      MRN: [id number]     Patient unavailable for physical therapy treatment due to  patient just returned to room after test, however pulled sheet back over head after encouragement to participate . Will attempt PT treatment at a later time. Thank you.      Kita Curtis, PTA    #[id number]  
Physical Therapy    Room #:   0631/0631-01    Date: 2024       Patient Name: Aureliano Falcon  : 1955      MRN: [id number]     Patient unavailable for physical therapy treatment due to refusal/ not participate with therapy. Pt started to scoot his hips towards the EOB when asked to sit up but once therapist assisted him, pt became resistive to movement and kept moving legs back to supine. Will attempt PT treatment tomorrow. Thank you.      Falguni Scott, PT    
Physical Therapy  Physical Therapy    Room #:   [facility] [place] ROOM/NONE    Date: 2024       Patient Name: Aureliano Falcon  : 1955      MRN: [id number]     Patient unavailable for physical therapy treatment due to off floor at special[facility]. Will attempt PT treatment at a later time. Thank you.      Russ Alejo, PT      
Physical Therapy  Physical Therapy Treatment Note/Plan of Care    Room #:  0631/0631-01  Patient Name: Aureliano Falcon  YOB: 1955  MRN: [id number]    Date of Service: 1/15/2024     Tentative placement recommendation: Subacute unless patient meets goals then Home Health Physical Therapy  Equipment recommendation: To be determined      Evaluating Physical Therapist: Reed Kirkpatrick, PT  #[id number]      Specific Provider Orders/Date/Referring Provider :     PT eval and treat  Start:  01/08/24 1000,   End:  01/08/24 1000,   ONE TIME,   Standing Count:  1 Occurrences,   R       Michael Gallo DO    Admitting Diagnosis:   Altered mental status [R41.82]  Influenza A [J10.1]  Urinary tract infection without hematuria, site unspecified [N39.0]  Acute metabolic encephalopathy [G93.41]     Admitted with    altered mental status, non verbal at baseline however ambulatory  hypoxia  Surgery: none  Visit Diagnoses         Codes    Acute metabolic encephalopathy    -  Primary G93.41    Influenza A     J10.1    Urinary tract infection without hematuria, site unspecified     N39.0            Patient Active Problem List   Diagnosis    Altered mental status        ASSESSMENT of Current Deficits Patient exhibits decreased strength, balance, and endurance impairing functional mobility, transfers, gait , gait distance, and tolerance to activity are barriers to d/c and require skilled intervention to address concerns listed above to increase safety and independence at discharge. Patient assisted with supine exercises in bed. When assisted with moving edge of bed, patient resisting movement and legs went into extension. Pt easily agitated. Patient would benefit from rehab stay to inc mobility and safety awareness.       PHYSICAL THERAPY  PLAN OF CARE       Physical therapy plan of care is established based on physician order,  patient diagnosis and clinical assessment    Current Treatment Recommendations:    -Bed Mobility: 
Physical Therapy  Physical Therapy Treatment Note/Plan of Care    Room #:  0631/0631-01  Patient Name: Aureliano Falcon  YOB: 1955  MRN: [id number]    Date of Service: 1/20/2024     Tentative placement recommendation: Subacute Rehab  Equipment recommendation: To be determined      Evaluating Physical Therapist: Reed Kirkpatrick, PT  #[id number]      Specific Provider Orders/Date/Referring Provider :     PT eval and treat  Start:  01/08/24 1000,   End:  01/08/24 1000,   ONE TIME,   Standing Count:  1 Occurrences,   R       Michael Gallo DO    Admitting Diagnosis:   Altered mental status [R41.82]  Influenza A [J10.1]  Urinary tract infection without hematuria, site unspecified [N39.0]  Acute metabolic encephalopathy [G93.41]     Admitted with    altered mental status, non verbal at baseline however ambulatory  hypoxia  Surgery: none  Visit Diagnoses         Codes    Acute metabolic encephalopathy    -  Primary G93.41    Influenza A     J10.1    Urinary tract infection without hematuria, site unspecified     N39.0    Nausea and vomiting, unspecified vomiting type     R11.2            Patient Active Problem List   Diagnosis    Altered mental status        ASSESSMENT of Current Deficits Patient exhibits decreased strength, balance, and endurance impairing functional mobility, transfers, gait , gait distance, and tolerance to activity and are barriers to d/c and require skilled intervention to address concerns listed above to increase safety and independence at discharge. Patient assisted with supine exercises in bed. Patient does open eyes and interact, speaks very softly. Patient was able to sit edge of bed 5 minutes with encouragement and assist to maintain upright posture. Patient was fidgety and pulled legs back to supine towards end of session. Patient positioned for comfort. Patient would benefit from rehab stay to inc mobility and safety awareness.       PHYSICAL THERAPY  PLAN OF CARE   Physical therapy 
Physical Therapy  Physical Therapy Treatment Note/Plan of Care    Room #:  0631/0631-01  Patient Name: Aureliano Falcon  YOB: 1955  MRN: [id number]    Date of Service: 1/29/2024     Tentative placement recommendation: Subacute Rehab  Equipment recommendation: To be determined      Evaluating Physical Therapist: Reed Kirkpatrick, PT  #[id number]      Specific Provider Orders/Date/Referring Provider :     PT eval and treat  Start:  01/08/24 1000,   End:  01/08/24 1000,   ONE TIME,   Standing Count:  1 Occurrences,   R       Michael Gallo DO    Admitting Diagnosis:   Altered mental status [R41.82]  Influenza A [J10.1]  Urinary tract infection without hematuria, site unspecified [N39.0]  Acute metabolic encephalopathy [G93.41]     Admitted with    altered mental status, non verbal at baseline however ambulatory  hypoxia  Surgery: none  Visit Diagnoses         Codes    Acute metabolic encephalopathy    -  Primary G93.41    Influenza A     J10.1    Urinary tract infection without hematuria, site unspecified     N39.0    Nausea and vomiting, unspecified vomiting type     R11.2            Patient Active Problem List   Diagnosis    Altered mental status        ASSESSMENT of Current Deficits Patient exhibits decreased strength, balance, and endurance impairing functional mobility, transfers, gait , gait distance, and tolerance to activity and are barriers to d/c and require skilled intervention to address concerns listed above to increase safety and independence at discharge. Pt required max education and  motivation to get EOB, pt resistant but assisted to EOB, max A. Pt impulsively laid back down, posteriorly and perpendicular on bed, assisted back to EOB. Pt has functional strength as patient constantly trying to get back into bed and lifts his legs.  Patient would benefit from rehab stay to inc mobility and safety awareness.       PHYSICAL THERAPY  PLAN OF CARE   Physical therapy plan of care is established 
Physical Therapy  Physical Therapy Treatment Note/Plan of Care    Room #:  0631/0631-01  Patient Name: Aureliano Falcon  YOB: 1955  MRN: [id number]    Date of Service: 1/17/2024     Tentative placement recommendation: Subacute Rehab  Equipment recommendation: To be determined      Evaluating Physical Therapist: Reed Kirkpatrick, PT  #[id number]      Specific Provider Orders/Date/Referring Provider :     PT eval and treat  Start:  01/08/24 1000,   End:  01/08/24 1000,   ONE TIME,   Standing Count:  1 Occurrences,   R       Michael Gallo DO    Admitting Diagnosis:   Altered mental status [R41.82]  Influenza A [J10.1]  Urinary tract infection without hematuria, site unspecified [N39.0]  Acute metabolic encephalopathy [G93.41]     Admitted with    altered mental status, non verbal at baseline however ambulatory  hypoxia  Surgery: none  Visit Diagnoses         Codes    Acute metabolic encephalopathy    -  Primary G93.41    Influenza A     J10.1    Urinary tract infection without hematuria, site unspecified     N39.0            Patient Active Problem List   Diagnosis    Altered mental status        ASSESSMENT of Current Deficits Patient exhibits decreased strength, balance, and endurance impairing functional mobility, transfers, gait , gait distance, and tolerance to activity are barriers to d/c and require skilled intervention to address concerns listed above to increase safety and independence at discharge. Patient assisted with supine exercises in bed. Patient does open eyes and interact, speaks very softly. Patient long sits in bed, however does not want to sit edge of bed, pulling legs back to supine. Patient positioned for comfort. Patient would benefit from rehab stay to inc mobility and safety awareness.       PHYSICAL THERAPY  PLAN OF CARE       Physical therapy plan of care is established based on physician order,  patient diagnosis and clinical assessment    Current Treatment Recommendations:    -Bed 
Post speech therapy evalutation, patient is to remain NPO. Will notify Dr Gallo due to concerns for obstruction in esophagus or small bowel due to nature of emesis being projectile in nature.  
Progress Note  1/20/2024 2:17 PM  Subjective:   Admit Date: 1/8/2024  PCP: Toshia Perea APRN - CNP  Interval History: Patient examined lethargic weak NG tube in place     Diet: Diet NPO Exceptions are: Ice Chips, Sips of Water with Meds  PN-Adult 3-in-1 Peripheral Line    Data:   Scheduled Meds:   potassium chloride  10 mEq IntraVENous Q1H    pantoprazole (PROTONIX) 40 mg in sodium chloride (PF) 0.9 % 10 mL injection  40 mg IntraVENous Q12H    metoclopramide  10 mg IntraVENous Q6H    ipratropium 0.5 mg-albuterol 2.5 mg  1 Dose Inhalation Q4H WA RT    clonazePAM  0.5 mg Per NG tube QAM    clonazePAM  1.5 mg Per NG tube Nightly    clonazePAM  0.25 mg Per NG tube Daily    methylPREDNISolone  40 mg IntraVENous Daily    sodium chloride (Inhalant)  4 mL Nebulization BID    piperacillin-tazobactam  3,375 mg IntraVENous Q8H    nicotine  1 patch TransDERmal Daily    sodium chloride flush  5-40 mL IntraVENous 2 times per day    sodium chloride flush  5-40 mL IntraVENous 2 times per day    benztropine  2 mg Oral BID    [Held by provider] metFORMIN  500 mg Oral Daily    enoxaparin  40 mg SubCUTAneous Daily    budesonide  500 mcg Nebulization BID RT    insulin lispro  0-8 Units SubCUTAneous TID WC    insulin lispro  0-4 Units SubCUTAneous Nightly    cloZAPine  100 mg Oral QAM    cloZAPine  200 mg Oral Nightly     Continuous Infusions:   PN-Adult 3-in-1 Peripheral Line      dextrose 100 mL/hr at 01/20/24 1146    sodium chloride      dextrose       PRN Meds:LORazepam, acetaminophen, sodium chloride flush, sodium chloride flush, sodium chloride, ondansetron **OR** ondansetron, acetaminophen, prochlorperazine, polyethylene glycol, glucose, dextrose bolus **OR** dextrose bolus, glucagon (rDNA), dextrose  I/O last 3 completed shifts:  In: 3232.7 [P.O.:300; I.V.:2455.9; IV Piggyback:476.8]  Out: 4680 [Urine:3105; Emesis/NG output:1575]  I/O this shift:  In: -   Out: 550 [Urine:550]    Intake/Output Summary (Last 24 hours) at 
Progress Note  1/21/2024 3:36 PM  Subjective:   Admit Date: 1/8/2024  PCP: Toshia Perea APRN - CNP  Interval History: Patient examined doing ok in no distress     Diet: Diet NPO Exceptions are: Ice Chips, Sips of Water with Meds  PN-Adult 3-in-1 Peripheral Line    Data:   Scheduled Meds:   oxymetazoline  2 spray Each Nostril Once    lidocaine   Topical Once    pantoprazole (PROTONIX) 40 mg in sodium chloride (PF) 0.9 % 10 mL injection  40 mg IntraVENous Q12H    metoclopramide  10 mg IntraVENous Q6H    ipratropium 0.5 mg-albuterol 2.5 mg  1 Dose Inhalation Q4H WA RT    clonazePAM  0.5 mg Per NG tube QAM    clonazePAM  1.5 mg Per NG tube Nightly    clonazePAM  0.25 mg Per NG tube Daily    methylPREDNISolone  40 mg IntraVENous Daily    sodium chloride (Inhalant)  4 mL Nebulization BID    piperacillin-tazobactam  3,375 mg IntraVENous Q8H    nicotine  1 patch TransDERmal Daily    sodium chloride flush  5-40 mL IntraVENous 2 times per day    sodium chloride flush  5-40 mL IntraVENous 2 times per day    benztropine  2 mg Oral BID    [Held by provider] metFORMIN  500 mg Oral Daily    enoxaparin  40 mg SubCUTAneous Daily    budesonide  500 mcg Nebulization BID RT    insulin lispro  0-8 Units SubCUTAneous TID WC    insulin lispro  0-4 Units SubCUTAneous Nightly    cloZAPine  100 mg Oral QAM    cloZAPine  200 mg Oral Nightly     Continuous Infusions:   PN-Adult 3-in-1 Peripheral Line 75 mL/hr at 01/20/24 1831    sodium chloride      dextrose       PRN Meds:LORazepam, acetaminophen, sodium chloride flush, sodium chloride flush, sodium chloride, ondansetron **OR** ondansetron, acetaminophen, prochlorperazine, polyethylene glycol, glucose, dextrose bolus **OR** dextrose bolus, glucagon (rDNA), dextrose  I/O last 3 completed shifts:  In: 300 [P.O.:300]  Out: 4155 [Urine:4155]  I/O this shift:  In: -   Out: 975 [Urine:975]    Intake/Output Summary (Last 24 hours) at 1/21/2024 1536  Last data filed at 1/21/2024 1325  Gross per 24 
Pt transferred to chair with 1 assist contact assist- took 3 steps sat for 30 min fed self 3 bites for breakfast drank 3/4 cup of water - back to bed with 2 assist - legs weaker but tolerated well soon to sleep  
Pt's brother brought in bottles, home med reconciled and are given at home as per pharmacy's note.  
Report received from nightshift RN. Patient awake and alert in bed, nonverbal, ELIUD orientation status but opens his eyes to his name, briefly tracks movement, and can follow simple commands. With time, assistance, and encouragement, patient ate 100% of breakfast this morning. Aldana checked and confirmed as free of kinks or dependent loops, draining appropriately. X1 BM overnight per nightshift RN. Vital signs reviewed. Labs and orders reviewed. Will assume care of patient.  
Report received from nightshift RN. Patient resting in bed, arousable to voice but not verbally interactive. Tracks movement with eyes but does not answer orientation questions. On 1L NC, attempted to be weaned to RA by nightshift but desaturated to 88%. Telesitter in place for previous pulling of LDAs. Vitals signs reviewed. Labs and orders reviewed. Will assume care of patient.  
Report received from nightshift RN. Patient sleeping in bed comfortably with no overt signs or symptoms of pain or discomfort. Remains on 1L NC overnight. Patient nonverbal/not responsive but following simple commands, tracking movement with eyesight. Transport arrived to take patient PETER for HIDA scan. NPO since midnight. Vital signs reviewed. Labs and orders reviewed. Will assume care of patient.  
SLP ALL NOTES  SPEECH LANGUAGE PATHOLOGY  DAILY PROGRESS NOTE        PATIENT NAME:  Aureliano Falcon      :  1955          TODAY'S DATE:  2024 ROOM:  [address] [place] [address]    Speech Pathologist (SLP) completed education with the patient/family regarding type of swallowing impairment. Reviewed current solid/liquid consistency diet recommendations and discussed compensatory strategies to ensure safe PO intake. Reviewed aspiration precautions. Encouraged patient and/or family to engage SLP in unstructured Q&A session relative to identified deficit areas; indicated understanding of all information provided via satisfactory verbal response.    Per nursing pot is tolerating PO meds with thin water despite large, sequential sips. At CBSE, pt alerts to name and stimulus and follows some basic one step commands but remained nonverbal. He tolerated large, sequential straw drinks of thin water without overt clinical indicators aspiration or respiratory distress along with puree trials. The pt was however unable to demonstrate successful bolus acceptance and subsequent bolus mastication of solids.    The recommended dirt at this time is puree with thin liquids. Ongoing dysphagia management recommended. See eval for complete details.     Love Nguyễn, SLP  [id number]    CPT code(s) 39793  dysphagia tx  Total minutes :  15 minutes    
SPEECH LANGUAGE PATHOLOGY  DAILY PROGRESS NOTE        PATIENT NAME:  Aureliano Falcon      :  1955          TODAY'S DATE:  2024 ROOM:  [address] [place] [address]    Patient seen for f/u for dysphagia mgmt. RN cleared patient for tx. Patient received pureed textures and nectar thick liquids via cup. Patient oral stage exhibited min oral stasis after 1st swl that cleared w/ 2nd swl and/or liquid wash, fair bolus formation;manipulation, adequate mastication time, and fair AP tongue propulsion. Patient pharyngeal stage exhibited no overt clinical indicators of pen/aspiration. SLP recommends continuing current diet of pureed textures and nectar thick liquids.       CPT code(s) 90845  dysphagia tx  Total minutes :  10 minutes    Kathy Bailey M.S., CCC-SLP  Speech-Language Pathologist  SP. [id number]      
SPEECH LANGUAGE PATHOLOGY  DAILY PROGRESS NOTE      PATIENT NAME:  Aureliano Falcon      :  1955          TODAY'S DATE:  2024 ROOM:  [facility]/0631-    Current Diet: Diet NPO    Patient seen for dysphagia therapy patient more alert and will open eyes and engage in task.  Patient would take the spoon and place in mouth and would hold the cup and take a sip.  No clinical indicators of dysphagia with the limited intake of thin.      Recommendation: recommend clear liquid diet  thin liquids (IDDSI level 0)  will work to advance beyond that once GI and medical Dr. Feel appropriate to advance beyond liquids (once he is even cleared for liquids)        CPT code(s) 26332  dysphagia tx  Total minutes :  15 minutes    Becca Le MSCCC/SLP  Speech Language Pathologist  [id number]      
SPEECH LANGUAGE PATHOLOGY  DAILY PROGRESS NOTE      PATIENT NAME:  Aureliano Falcon      :  1955          TODAY'S DATE:  2024 ROOM:  [facility]/0631-    Current Diet: Diet NPO  Diet NPO Exceptions are: Sips of Water with Meds    Patient seen for dysphagia therapy.   Patient with significantly moist respirations and still with altered mentation.  Did not follow directions well during session.  Small amounts of water given but due to underlying moist respirations not able to determine if aspiration was present or not.  And nursing reports emesis again with PO meds.     Recommendation: continue NPO will continue to address swallow function as alertness improves. Discussed with nursing       CPT code(s) 70221  dysphagia tx  Total minutes :  15 minutes    Becca Le MSCCC/SLP  Speech Language Pathologist  [id number]      
SPEECH/LANGUAGE PATHOLOGY  VIDEOFLUOROSCOPIC STUDY OF SWALLOWING (MBS)   and PLAN OF CARE    PATIENT NAME:  Aureliano Falcon  (male)     MRN:  [id number]    :  1955  (68 y.o.)  STATUS:  Inpatient: Room 0631/0631-    TODAY'S DATE:  2024  REFERRING PROVIDER:   Dr. Gallo   SPECIFIC PROVIDER ORDER: SLP video swallow  Date of order:  24   REASON FOR REFERRAL: dysphagia    EVALUATING THERAPIST: Becca Le, SLP      RESULTS:      DYSPHAGIA DIAGNOSIS:  moderate-severe oropharyngeal phase dysphagia     DIET RECOMMENDATIONS:  Pureed consistency solids (IDDSI level 4) with  nectar consistency (mildly thick - IDDSI level 2) liquids, MEDICATION ADMINISTRATION, and Administer medication crushed, as able, with pudding/applesauce if unable to crush give one at a time whole in puree     At end of study gave barium used for UGI to patient and he did aspirate this barium as well despite being a little thicker than the thin barium used for the modified barium swallow study    FEEDING RECOMMENDATIONS:    Assistance level:  Set-up is required for all oral intake, Supervision is needed during all oral intake, Encourage self-feeding     Compensatory strategies recommended: Upright in bed/ chair as tolerated, Fully alert for all PO, Small bites/sips, Chin neutral to slightly down , and NO STRAW     Discussed recommendations with nursing and/or faxed report to referring provider: Yes    Laryngeal Penetration and Aspiration:  Penetration WITH aspiration was observed in today's study with  thin liquid    SPEECH THERAPY  PLAN OF CARE   The dysphagia POC is established based on physician order and dysphagia diagnosis    Skilled SLP intervention for dysphagia management on acute care up to 5 x per week until goals met, pt plateaus in function and/or discharged from hospital      Conditions Requiring Skilled Therapeutic Intervention for dysphagia:    posterior escape of greater than half of the bolus.   repetitive/disorganized 
Sbar completed and placed on chart. Chart with patient in transit.   
Sister Ruby Updated  
Sitting up in bed with no s/s of distress or pain noted. Patient is eating with assistance and speaking with staff that is assisting him with meal. Patient continues to have mumbling/stuttering speech. (See shift assessment)  
Spoke with Dr Gallo re: patient's inability to ingest medications for mental health due to continued emesis. CT scan results reviewed. Dr aware of need for oxygen, change in lungs sounds and low grade temp. Orders received for IV Ativan 0.5mg every 6 hours PRN and to consult GI for further workup  
This nurse, along with 2nd nurse, spoke with patient's mother, Lashanda on phone. Verbal consent was received in english via telephone. No questions at this time. Mother states, \"Please just take care of my Fahad\". Unable to be at hospital due concerns for illness.   
Was unable to get the NG tube placed in pt. Tried twice. Called Jeffrey [facility]REG[facility] and asked if the tube was for feeding only and if he needed it. He stated we could leave it out if pt was more alert. I am able to arouse pt and has been taking his meds orally. Pt will also be getting a video swallow  study to determine diet tomorrow  
01/23/2024 06:12 AM    LYMPHOPCT 10 01/11/2024 08:29 AM    MONOPCT 4 01/11/2024 08:29 AM    BASOPCT 0 01/11/2024 08:29 AM    MONOSABS 0.41 01/11/2024 08:29 AM    LYMPHSABS 0.90 01/11/2024 08:29 AM    EOSABS 0 01/11/2024 08:29 AM    BASOSABS 0 01/11/2024 08:29 AM     CMP:    Lab Results   Component Value Date/Time     01/23/2024 06:12 AM    K 3.9 01/23/2024 06:12 AM     01/23/2024 06:12 AM    CO2 31 01/23/2024 06:12 AM    BUN 22 01/23/2024 06:12 AM    CREATININE 1.4 01/23/2024 06:12 AM    LABGLOM 54 01/23/2024 06:12 AM    GLUCOSE 104 01/23/2024 06:12 AM    PROT 5.6 01/23/2024 06:12 AM    LABALBU 3.0 01/23/2024 06:12 AM    CALCIUM 8.5 01/23/2024 06:12 AM    BILITOT 0.5 01/23/2024 06:12 AM    ALKPHOS 132 01/23/2024 06:12 AM     01/23/2024 06:12 AM     01/23/2024 06:12 AM       CLINICAL ASSESMENT:  Acute hypoxemic respiratory insufficiency  Influenza A  Likely superimposed bacterial pneumonia  Encephalopathy  GI bleeding  Protein calorie malnutrition    PLAN:   Currently on 4 L nasal cannula.  Continue to wean.  Maintain aspiration precautions.  Continue hypertonic saline  Unfortunately the patient's neurological status remains about the same.  CT of the head on January 8 showed no acute intracranial abnormality.  His ammonia level on the 19th was normal at 24.  His sodium is within normal limits.  While he does have a history of schizophrenia I would certainly have hope that his neurological status with start to recover by now.    DVT prophylaxis          
01/27/2024 07:05 AM    MCH 30.0 01/27/2024 07:05 AM    MCHC 31.8 01/27/2024 07:05 AM    RDW 14.2 01/27/2024 07:05 AM    LYMPHOPCT 10 01/27/2024 07:05 AM    MONOPCT 5 01/27/2024 07:05 AM    BASOPCT 0 01/27/2024 07:05 AM    MONOSABS 0.58 01/27/2024 07:05 AM    LYMPHSABS 1.17 01/27/2024 07:05 AM    EOSABS 0.04 01/27/2024 07:05 AM    BASOSABS 0.03 01/27/2024 07:05 AM     CMP:    Lab Results   Component Value Date/Time     01/28/2024 07:18 AM    K 3.9 01/28/2024 07:18 AM     01/28/2024 07:18 AM    CO2 25 01/28/2024 07:18 AM    BUN 26 01/28/2024 07:18 AM    CREATININE 1.3 01/28/2024 07:18 AM    LABGLOM >60 01/28/2024 07:18 AM    GLUCOSE 128 01/28/2024 07:18 AM    PROT 5.7 01/28/2024 07:18 AM    LABALBU 2.7 01/28/2024 07:18 AM    CALCIUM 8.8 01/28/2024 07:18 AM    BILITOT 0.4 01/28/2024 07:18 AM    ALKPHOS 130 01/28/2024 07:18 AM    AST 53 01/28/2024 07:18 AM     01/28/2024 07:18 AM       CLINICAL ASSESMENT:  Acute hypoxemic respiratory insufficiency  Influenza A  Likely superimposed bacterial pneumonia  Encephalopathy  GI bleeding  Protein calorie malnutrition    PLAN:   Patient currently off supplemental oxygen.  Maintain aspiration precautions.  Continue hypertonic saline  Unfortunately the patient's neurological status remains about the same.  CT of the head on January 8 showed no acute intracranial abnormality.  His ammonia level on the 19th was normal at 24.  His sodium is within normal limits.  While he does have a history of schizophrenia I would certainly have hope that his neurological status with start to recover by now.   We will sign off, please call with questions.  DVT prophylaxis  
1302  Last data filed at 1/20/2024 1123  Gross per 24 hour   Intake 800 ml   Output 3505 ml   Net -2705 ml         Physical exam-  General appearance: Very ill-appearing,, does not communicate , AXO X 0   Head: Normocephalic, without obvious abnormality, atraumatic   Eyes:Pupils bilateral equal and reactive, EOM intact, conjunctiva - no icterus , no injection   Throat: Clear, no lesions, no tonsillar eythema or edema  Neck: Supple, symmetrical, trachea midline, no lymphadenopathy, no JVD, no carotid bruits, no thyromegaly   Lungs: Bilateral  Air movement diminished overall , coarse conducted sounds for pent up secretions in larger airways .   Heart: RRR, S1, S2 normal, no murmur, click, rub or gallop   Abdomen: soft, non-tender, nondistended. Bowel sounds normal, no hepatomeagly  Extremities: extremities normal, atraumatic, no cyanosis, edema  Musculoskeletal - No deformities   Skin: Skin color, texture, turgor normal. No rashes or lesions   Neurological: tremulous motion of the hands , moves all four , does not follow commands , does not communicate   Psychiatric : Mood and effect- unable to evaluate , not responding to commands , alert and oriented times 0      Routine labs:    Recent Labs     01/18/24  0721 01/19/24  0728 01/20/24  0650   WBC 13.4* 12.8* 17.2*   HGB 14.4 14.3 13.5   HCT 42.8 43.5 40.9    360 381     Recent Labs     01/18/24  0721 01/19/24  0728 01/20/24  0650    137 135   K 2.9* 3.3* 2.9*   CL 91* 89* 91*   CO2 37* 42* 35*   BUN 20 19 17   CREATININE 1.0 1.3* 1.5*   MG 2.3  --  2.4   PHOS 2.5  --  2.5     Other Laboratory - Imaging Studies:     Reviewed and as per electronic record. CxR/CT images reviewed by me when available.   Total time spent for this encounter was 35 minutes including but not limited to patient exam , family discussion , discussion with consultants , chart review and documentation.         Zohaib Martinez MD  01/20/24            
94.2 01/24/2024 07:21 AM    MCH 30.2 01/24/2024 07:21 AM    MCHC 32.1 01/24/2024 07:21 AM    RDW 13.7 01/24/2024 07:21 AM    LYMPHOPCT 10 01/11/2024 08:29 AM    MONOPCT 4 01/11/2024 08:29 AM    BASOPCT 0 01/11/2024 08:29 AM    MONOSABS 0.41 01/11/2024 08:29 AM    LYMPHSABS 0.90 01/11/2024 08:29 AM    EOSABS 0 01/11/2024 08:29 AM    BASOSABS 0 01/11/2024 08:29 AM     CMP:    Lab Results   Component Value Date/Time     01/26/2024 07:11 AM    K 3.8 01/26/2024 07:11 AM     01/26/2024 07:11 AM    CO2 28 01/26/2024 07:11 AM    BUN 27 01/26/2024 07:11 AM    CREATININE 1.4 01/26/2024 07:11 AM    LABGLOM 56 01/26/2024 07:11 AM    GLUCOSE 112 01/26/2024 07:11 AM    PROT 6.0 01/26/2024 07:11 AM    LABALBU 3.1 01/26/2024 07:11 AM    CALCIUM 8.6 01/26/2024 07:11 AM    BILITOT 0.5 01/26/2024 07:11 AM    ALKPHOS 133 01/26/2024 07:11 AM    AST 73 01/26/2024 07:11 AM     01/26/2024 07:11 AM       CLINICAL ASSESMENT:  Acute hypoxemic respiratory insufficiency  Influenza A  Likely superimposed bacterial pneumonia  Encephalopathy  GI bleeding  Protein calorie malnutrition    PLAN:   Currently on 0.5 L nasal cannula.  Continue to wean.  Maintain aspiration precautions.  Continue hypertonic saline  Unfortunately the patient's neurological status remains about the same.  CT of the head on January 8 showed no acute intracranial abnormality.  His ammonia level on the 19th was normal at 24.  His sodium is within normal limits.  While he does have a history of schizophrenia I would certainly have hope that his neurological status with start to recover by now.    DVT prophylaxis    
Adjustment         BMI Categories: Overweight (BMI 25.0-29.9)    Estimated Daily Nutrient Needs:  Energy Requirements Based On: Kcal/kg  Weight Used for Energy Requirements: Current  Energy (kcal/day): 1400 - 1750 kcals/day (CBW 20-25 kcals/kg overweight BMI 25-30)  Weight Used for Protein Requirements: Ideal  Protein (g/day): 77 - 118g/day (IBW 1.3-2g/kg overweight BMI 25-30 guidelines)  Method Used for Fluid Requirements: 1 ml/kcal  Fluid (ml/day): 1400- 1750 mls/day or per care team    Nutrition Diagnosis:   Predicted inadequate energy intake related to altered GI function, altered GI structure, swallowing difficulty as evidenced by NPO or clear liquid status due to medical condition, swallow study results    Nutrition Interventions:   Food and/or Nutrient Delivery: Continue NPO  Nutrition Education/Counseling: No recommendation at this time  Coordination of Nutrition Care: Continue to monitor while inpatient     Goals:   Goals: Initiate PO diet, by next RD assessment     Nutrition Monitoring and Evaluation:   Behavioral-Environmental Outcomes: None Identified  Food/Nutrient Intake Outcomes: Diet Advancement/Tolerance  Physical Signs/Symptoms Outcomes: Biochemical Data, Chewing or Swallowing, Diarrhea, GI Status, Fluid Status or Edema, Weight, Skin, Nutrition Focused Physical Findings    Discharge Planning:    Too soon to determine     Andra Krishnan RD  Contact: [phone]    
CT scan  -CT abdomen pelvis 1/17/2024 showing fluid-filled small bowel loops, prominent wall distal and terminal ileum, liquid stool in the colon  -CT abdomen pelvis 1/10/2024 showing fluid distention of the small and large bowel, mild mural thickening of the distal esophagus  -Questionable dysmotility versus esophageal obstruction versus GOO  -Continue IV PPI twice daily  -NG tube containing significant drainage  -KUB 1/18/2024 showing nonspecific bowel gas pattern  -EGD 1/19/2024 by Dr. Barnett with severe ulcerative esophagitis, acute ulcers in the gastric fundus, polyp at the gastroesophageal junction     2.  Diarrhea  -Stool studies as ordered per admitting - not reported  -Nonemergent, likely outpatient colonoscopy     3.  Influenza A  -Likely contributing to patient's symptoms  -Per admitting     4.  Elevated LFTs  -Nonobstructive liver profile  -Influenza possibly contributing  -Pending acute hepatitis panel / hep B-e  -Iron deficiency with somewhat increased ferritin, however possibly acute phase  -CT abdomen pelvis 1/10/2024 showing normal-appearing liver with mild fatty infiltration, small cyst, normal caliber bile ducts, unremarkable appearing gallbladder and spleen  -Ultrasound liver 1/18/2024 showing unremarkable appearing liver, unremarkable gallbladder and bile ducts, no evidence of stones or sludge, normal caliber CBD 4.8 mm  -HIDA scan 1/26/2024 showing patent ducts, no evidence of cholecystitis          Venancio Wisdom MD  1/29/2024  9:20 AM    NOTE:  This report was transcribed using voice recognition software.  Every effort was made to ensure accuracy; however, inadvertent computerized transcription errors may be present.  
and clinical assessment    Current Treatment Recommendations:    -Bed Mobility: Lower extremity exercises , Upper extremity exercises , and Trunk control activities   -Sitting Balance: Incorporate reaching activities to activate trunk muscles  and Engage in core activities to allow for movement within base of support   -Standing Balance: Perform strengthening exercises in standing to promote motor control with or without upper extremity support  and Challenge balance utilizing reaching  activities beyond center of gravity    -Transfers: Provide instruction on proper hand and foot position for adequate transfer of weight onto lower extremities and use of gait device if needed and Cues for hand placement, technique and safety. Provide stabilization to prevent fall   -Gait: Gait training, Standing activities to improve: base of support, weight shift, weight bearing , Exercises to improve trunk control, and Exercises to improve hip and knee control   -Endurance: Utilize Supervised activities to increase level of endurance to allow for safe functional mobility including transfers and gait  and Use graduated activities to promote good breathing techniques and provide support and education to maximize respiratory function    PT long term treatment goals are located in below grid    Patient and or family understand(s) diagnosis, prognosis, and plan of care.    Frequency of treatments: Patient will be seen  daily.         Prior Level of Function: Patient ambulated independently  non verbal per chart however did answer yes no occasionally  Rehab Potential: fair   for baseline    Past medical history:   Past Medical History:   Diagnosis Date    Smoker 01/08/2024    4 cartons per month     Past Surgical History:   Procedure Laterality Date    UPPER GASTROINTESTINAL ENDOSCOPY N/A 1/19/2024    EGD BIOPSY performed by Temo Barnett MD at [facility] ENDOSCOPY       SUBJECTIVE:  Precautions: Up with assistance, falls, alarm, O2, and 
emesis.  Patient's abdomen is little bit more distended today.  Patient with bowel sounds are hypoactive.  BUN/creatinine was 21/1.1.  Blood sugars range 100-155.  Transaminase normal with a WBC of 9.7 hemoglobin 14.4.  Temperature is 97.8 with heart rate of 95 and blood pressure 119/68.  O2 sat 90-98% on 3 L nasal cannula.  Will check a CT of the abdomen with oral contrast today.  Continue IV fluids    1/11/2024  Patient seen examined in [facility].  GI note reviewed.  Patient is more alert today.  Patient does occasionally speak very softly and answers some questions appropriately and very short sentences.  BUN/creatinine was 33/1.3 with CO2 18 and a serum sodium 147.  Blood sugars range 114-134.  WBCs 9.5 hemoglobin 15.4.  Temperature ranges 97.5-101 degrees.  Heart rate is 111 with blood pressure 149/82.  O2 sat 97% on 4 L nasal cannula.  CT of the abdomen pelvis showed fluid distention of small bowel loops and colon indicating diarrhea with mural thickening of distal esophagus bladder wall thickening noted in bilateral pulmonary opacities which may represent atelectasis or pneumonia.  Abdomen is still mildly distended.  Patient be set up for EGD tomorrow.  Check BMP later today with creatinine increasing to 1.3 unchanged IV fluids half-normal saline with 10 KCl.  With elevated temperature will check blood cultures.  Urine cultures x 2 have been negative.  Patient has bacteriuria.  MRSA screening.    1/12/2024  Patient seen and examined on [facility].  Case discussed with patient's nurse at the bedside.  The patient apparently was having multiple episodes of emesis last night and NG tube was inserted.  Patient also started becoming more hypoxic last night and patient's O2 nasal cannula was increased to 10 L high flow.  Currently nursing states that she has them on 7 L.  NG tube was put to low intermittent suction and immediately has almost 1 and 1/2 L out.  Patient also has been with more lethargic today according to nursing. 
least restrictive PO diet     Specific instructions for next treatment:  development and training of compensatory swallow strategies to improve airway protection and swallow function, therapeutic po trial to determine safety of advanced diet textures and consistencies, ongoing PO analysis to upgrade diet and evaluate tolerance of current PO recommendation, initiate instruction of therapeutic exercises , and initiate instruction of compensatory strategies  Patient Treatment Goals:    Short Term Goals:  Pt will participate in ongoing evaluation of swallow function to determine when PO diet can be safely initiated  Pt will participate in MBSS to fully assess oropharyngeal swallow function and to assist in determining the least restrictive PO diet to maintain adequate nutrition/hydration   Pt will implement identified compensatory swallowing strategies on 90% of opportunities or greater to improve airway protection and swallow function.  Pt will participate in meal time assessment for 1-2 sessions to provide diet modification and compensatory strategy implementation due to staff report of dysphagia symptoms during meals     Long Term Goals:   Pt will maintain adequate nutrition/hydration via PO intake of the least restrictive oral diet with implementation of safe swallow/ compensatory strategies and decrease signs/symptoms of aspiration to less than 1 x/day.   Pt will improve oropharyngeal swallow function to ensure airway protection during PO intake to maintain adequate nutrition/hydration and decrease signs/symptoms of aspiration to less than 1 x/day.      Patient/family Goal:    Did not state.  Will further assess during treatment.    Plan of care discussed with Patient   The Patient did not demonstrate complete understanding of the diagnosis, prognosis and plan of care     Rehabilitation Potential/Prognosis: fair                    ADMITTING DIAGNOSIS: Altered mental status [R41.82]  Influenza A [J10.1]  Urinary 
(37.2 °C) Axillary 94 26 95 % --   01/12/24 1315 -- -- -- -- -- 92 % --       General: Obtunded male, ill-appearing, no acute distress  HEENT: Pupils equal and reactive to light, oral mucosa dry, NG tube in place  Neck: No JVD  Chest: Chest is symmetrical,  Lungs: Mildly diminished breath sounds without crackles, rales, rhonchi or wheezes.  Heart: Regular rate and rhythm. No S3 gallop. No  murmrur.  Abdomen: Soft non distended, non tender and normal bowel sounds.  Extremeties: No edema.         Assessment/Plan:       1-Acute Kidney Injury  -Baseline creatinine appears to be 1.0 to 1.2 mg/dL  -He may have some early stage chronic kidney disease and mild proteinuria in the setting of diabetes  -He seemed to have prerenal azotemia, he initially responded to IV fluids, however he did receive a CT of the abdomen with contrast on January 10 and the subsequent 2 days his creatinine is continued to bump up and he has been n.p.o. and had poor fluid intake so it is likely he has some degree of contrast-induced nephrotoxicity  -He also appears dehydrated and volume depleted, will give IV fluids  -Renal imaging did not show hydronephrosis, merely some bladder wall thickening which may be consistent with mild infection, his initial UA did seem some findings consistent with UTI, continue empiric antibiotics  -Agree with holding metformin  -Await repeat UA / order PCR  -Continue current IV fluids at 80 MLS per hour  -Repeat a.m. labs and continue strict intake and output     2-Electrolyte/acid-base disorders  -Hypernatremia, serum sodium between 146 and 147 over the past 2 days due to poor oral intake  -There is a mild hyperchloremic non-anion gap metabolic acidosis, likely from the normal saline, will continue D5 to correct the hypernatremia and lactated Ringer's for fluid resuscitation     3-BP/volume status  -normotensive 110s/50s  -No changes     4-CKD MBD  -Ca 8.6/albumin 2.9   -Phosphorus extremely low at 1.4 - supplement IV 
(PULMICORT) nebulizer suspension 500 mcg  500 mcg Nebulization BID RT Gallo, Michael A, DO   500 mcg at 01/18/24 0537    prochlorperazine (COMPAZINE) injection 10 mg  10 mg IntraVENous Q6H PRN Gallo, Michael A, DO   10 mg at 01/11/24 0313    polyethylene glycol (GLYCOLAX) packet 17 g  17 g Oral Daily PRN Gallo, Michael A, DO        glucose chewable tablet 16 g  4 tablet Oral PRN Gallo, Michael A, DO        dextrose bolus 10% 125 mL  125 mL IntraVENous PRN Gallo, Michael A, DO        Or    dextrose bolus 10% 250 mL  250 mL IntraVENous PRN Gallo, Michael A, DO        glucagon injection 1 mg  1 mg SubCUTAneous PRN Gallo, Michael A, DO        dextrose 10 % infusion   IntraVENous Continuous PRN Gallo, Michael A, DO        insulin lispro (HUMALOG) injection vial 0-8 Units  0-8 Units SubCUTAneous TID WC Gallo, Michael A, DO   2 Units at 01/13/24 1319    insulin lispro (HUMALOG) injection vial 0-4 Units  0-4 Units SubCUTAneous Nightly Gallo, Michael A, DO        cloZAPine (CLOZARIL) tablet 100 mg  100 mg Oral QAM Gallo, Michael A, DO   100 mg at 01/18/24 0938    cloZAPine (CLOZARIL) tablet 200 mg  200 mg Oral Nightly Gallo, Michael A, DO   200 mg at 01/17/24 2124       CT ABDOMEN PELVIS WO CONTRAST Additional Contrast? Oral (Via NG tube)   Final Result   1. Fluid-filled small bowel loops with mild wall thickening of the distal   ileum. Consider enteritis including inflammatory and infectious etiology.   2. Persistent urinary bladder wall thickening with gas in the bladder. Aldana   catheter in good position.   3. Right adrenal lipid rich adenoma.   4. Right lower lobe atelectasis or pneumonia. Lung bases have improved   compared to 01/10/2024.         XR ABDOMEN (KUB) (SINGLE AP VIEW)   Final Result   Distal enteric tube in satisfactory position within the stomach.         XR CHEST PORTABLE   Final Result   Patchy infiltrates in the left lung and small left pleural effusion.      Elevated right hemidiaphragm.    
Nebulization BID RT Gallo, Michael A, DO   500 mcg at 01/17/24 0645    prochlorperazine (COMPAZINE) injection 10 mg  10 mg IntraVENous Q6H PRN Gallo, Michael A, DO   10 mg at 01/11/24 0313    polyethylene glycol (GLYCOLAX) packet 17 g  17 g Oral Daily PRN Gallo, Michael A, DO        glucose chewable tablet 16 g  4 tablet Oral PRN Gallo, Michael A, DO        dextrose bolus 10% 125 mL  125 mL IntraVENous PRN Gallo, Michael A, DO        Or    dextrose bolus 10% 250 mL  250 mL IntraVENous PRN Gallo, Michael A, DO        glucagon injection 1 mg  1 mg SubCUTAneous PRN Gallo, Michael A, DO        dextrose 10 % infusion   IntraVENous Continuous PRN Gallo, Michael A, DO        insulin lispro (HUMALOG) injection vial 0-8 Units  0-8 Units SubCUTAneous TID WC Gallo, Michael A, DO   2 Units at 01/13/24 1319    insulin lispro (HUMALOG) injection vial 0-4 Units  0-4 Units SubCUTAneous Nightly Gallo, Michael A, DO        cloZAPine (CLOZARIL) tablet 100 mg  100 mg Oral QAM Gallo, Michael A, DO   100 mg at 01/17/24 1049    cloZAPine (CLOZARIL) tablet 200 mg  200 mg Oral Nightly Gallo, Michael A, DO   200 mg at 01/16/24 2219       CT ABDOMEN PELVIS WO CONTRAST Additional Contrast? Oral (Via NG tube)   Final Result   1. Fluid-filled small bowel loops with mild wall thickening of the distal   ileum. Consider enteritis including inflammatory and infectious etiology.   2. Persistent urinary bladder wall thickening with gas in the bladder. Aldana   catheter in good position.   3. Right adrenal lipid rich adenoma.   4. Right lower lobe atelectasis or pneumonia. Lung bases have improved   compared to 01/10/2024.         XR ABDOMEN (KUB) (SINGLE AP VIEW)   Final Result   Distal enteric tube in satisfactory position within the stomach.         XR CHEST PORTABLE   Final Result   Patchy infiltrates in the left lung and small left pleural effusion.      Elevated right hemidiaphragm.         XR ABDOMEN (KUB) (SINGLE AP VIEW)   Final 
however, inadvertent computerized transcription errors may be present.  
radiology and care team involved and reveals   XR ABDOMEN FOR NG/OG/NE TUBE PLACEMENT    Result Date: 1/12/2024  EXAMINATION: ONE SUPINE XRAY VIEW(S) OF THE ABDOMEN 1/12/2024 4:24 am COMPARISON: None. HISTORY: ORDERING SYSTEM PROVIDED HISTORY: Confirmation of course of NG/OG/NE tube and location of tip of tube TECHNOLOGIST PROVIDED HISTORY: Reason for exam:->Confirmation of course of NG/OG/NE tube and location of tip of tube Portable?->Yes FINDINGS: Nasogastric tube courses below the level of the diaphragm with distal tip in the expected location of the stomach.  There multiple gas-filled, distended segments of small bowel in the visualized abdomen.     Satisfactory position of nasogastric tube.     XR CHEST PORTABLE    Result Date: 1/12/2024  EXAMINATION: ONE XRAY VIEW OF THE CHEST 1/12/2024 12:41 am COMPARISON: 01/08/2024 HISTORY: ORDERING SYSTEM PROVIDED HISTORY: increase O2 demand TECHNOLOGIST PROVIDED HISTORY: Reason for exam:->increase O2 demand FINDINGS: The cardiomediastinal silhouette is within normal limits.  There are low lung volumes with bronchovascular crowding bibasilar.  Persistent elevation of right hemidiaphragm no pneumothorax or pleural effusion.     Low lung volumes with bronchovascular crowding bibasilar.     CT ABDOMEN PELVIS W IV CONTRAST Additional Contrast? None    Result Date: 1/10/2024  EXAMINATION: CT OF THE ABDOMEN AND PELVIS WITH CONTRAST 1/10/2024 4:13 pm TECHNIQUE: CT of the abdomen and pelvis was performed with the administration of intravenous contrast. Multiplanar reformatted images are provided for review. Automated exposure control, iterative reconstruction, and/or weight based adjustment of the mA/kV was utilized to reduce the radiation dose to as low as reasonably achievable. COMPARISON: None. HISTORY: ORDERING SYSTEM PROVIDED HISTORY: Nausea/vomiting, mild abdominal distention TECHNOLOGIST PROVIDED HISTORY: Reason for exam:->Nausea/vomiting, mild abdominal distention Reason 
review and documentation.         Zohaib Martinez MD  01/17/24            
  XR CHEST PORTABLE   Final Result   Enteric tube tip in the fundus of the stomach.         XR ABDOMEN FOR NG/OG/NE TUBE PLACEMENT   Final Result   Satisfactory position of nasogastric tube.         XR CHEST PORTABLE   Final Result   Low lung volumes with bronchovascular crowding bibasilar.         CT ABDOMEN PELVIS W IV CONTRAST Additional Contrast? None   Final Result   1. Fluid distension of small bowel loops and colon indicating diarrheal   illness. No evidence of bowel wall thickening or obstruction.   2. Mild mural thickening in the distal esophagus likely indicating acute   esophagitis.   3. Bladder wall thickening, even allowing for under distension. Clinical   correlation for evidence of cystitis is recommended.   4. Mild hepatic steatosis.   5. Mildly enlarged prostate gland.   6. Bibasilar pulmonary opacities which may represent atelectasis or pneumonia.         CT Head W/O Contrast   Final Result   No acute intracranial abnormality.         XR CHEST PORTABLE   Final Result   No acute process.         CT ABDOMEN PELVIS WO CONTRAST Additional Contrast? Oral (Via NG tube)    (Results Pending)         LAB DATA     BMP, Mg, Phos    Lab Results   Component Value Date    CREATININE 1.1 01/16/2024    CREATININE 1.3 (H) 01/15/2024    CREATININE 1.5 (H) 01/14/2024    CREATININE 1.5 (H) 01/13/2024    CREATININE 1.7 (H) 01/12/2024    CREATININE 1.3 (H) 01/11/2024    CREATININE 1.1 01/10/2024    CREATININE 1.0 01/09/2024    CREATININE 1.0 01/08/2024    CREATININE 1.5 (H) 01/08/2024       CBC:   Recent Labs     01/14/24  0936 01/15/24  0833 01/16/24  1431   WBC 14.1* 14.7* 11.0   HGB 12.7 13.3 13.0    275 341        No results found for: \"IRON\", \"TIBC\", \"FERRITIN\"    No results found for: \"IPTH\"    No results found for: \"VITD25\"    Lab Results   Component Value Date    CALCIUM 8.3 (L) 01/16/2024    CALCIUM 8.8 01/15/2024    CALCIUM 8.9 01/14/2024    PHOS 3.0 01/16/2024    PHOS 2.7 01/15/2024    PHOS 3.0 
family history.    REVIEW OF SYSTEMS: Patient admitted with altered mental status per family    Gen: Patient denies any lightheadedness or dizziness.  No LOC or syncope.  No fevers or chills.    HEENT: No earache, sore throat or nasal congestion.    Resp: Denies cough, hemoptysis or sputum production.    Cardiac: Denies chest pain, SOB, diaphoresis or palpitations.    GI: No nausea, vomiting, diarrhea or constipation.  No melena or hematochezia.    : No urinary complaints, dysuria, hematuria or frequency.    MSK: No extremity weakness, paralysis or paresthesias.     PHYSICAL EXAM:    Vitals:  BP (!) 149/82   Pulse (!) 111   Temp (!) 101.9 °F (38.8 °C) (Axillary)   Resp 28   Ht 1.626 m (5' 4\")   Wt 70.5 kg (155 lb 8 oz)   SpO2 97%   BMI 26.69 kg/m²     General:  This is a 68 y.o. yo male who is alert and oriented in NAD but patient minimally verbally responsive  HEENT:  Head is normocephalic and atraumatic, PERRLA, EOMI, mucus membranes moist with no pharyngeal erythema or exudate.  Neck:  Supple with no carotid bruits, JVD or thyromegaly.  No cervical adenopathy  CV:  Regular rate and rhythm, no murmurs  Lungs: Coarse breath sounds to auscultation bilaterally with no wheezes, rales or rhonchi  Abdomen:  Soft, nontender, nondistended, bowel sounds present  Extremities:  No edema, peripheral pulses intact bilaterally  Neuro:  Cranial nerves II-XII grossly intact; motor and sensory function intact with no focal deficits  Skin:  No rashes, lesions or wounds    DATA:  CBC with Differential:    Lab Results   Component Value Date/Time    WBC 9.5 01/11/2024 08:29 AM    RBC 5.05 01/11/2024 08:29 AM    HGB 15.4 01/11/2024 08:29 AM    HCT 46.9 01/11/2024 08:29 AM     01/11/2024 08:29 AM    MCV 92.9 01/11/2024 08:29 AM    MCH 30.5 01/11/2024 08:29 AM    MCHC 32.8 01/11/2024 08:29 AM    RDW 14.6 01/11/2024 08:29 AM    LYMPHOPCT 10 01/11/2024 08:29 AM    MONOPCT 4 01/11/2024 08:29 AM    BASOPCT 0 01/11/2024 08:29 
    CPT codes:  Low Complexity PT evaluation (79434)  No treatment billed    Reed Kirkpatrick, PT    
and quality of life.         Patient's/ family goals   none stated    Time in  1029  Time out  1045    Total Treatment Time  16 minutes    CPT codes:  Therapeutic activities (19786)   16 minutes  1 unit(s)    [facility]    #[id number]  
extended-care facility today.    1/30/2024  Patient seen and examined on [facility].  Temperature 98.5 with heart rate of 66 and blood pressure ranges 81//76.  O2 sat 90-95% on room air at rest.  Urine output is good.    Past Medical History:    Past Medical History:   Diagnosis Date    Smoker 01/08/2024    4 cartons per month     Past Surgical History:    Past Surgical History:   Procedure Laterality Date    UPPER GASTROINTESTINAL ENDOSCOPY N/A 1/19/2024    EGD BIOPSY performed by Temo Barnett MD at [facility] ENDOSCOPY       Medications Prior to Admission:    @  Prior to Admission medications    Medication Sig Start Date End Date Taking? Authorizing Provider   ipratropium 0.5 mg-albuterol 2.5 mg (DUONEB) 0.5-2.5 (3) MG/3ML SOLN nebulizer solution Inhale 3 mLs into the lungs every 6 hours as needed for Shortness of Breath 1/29/24  Yes Michael Gallo DO   ondansetron (ZOFRAN-ODT) 4 MG disintegrating tablet Take 1 tablet by mouth every 8 hours as needed for Nausea or Vomiting 1/29/24  Yes Michael Gallo DO   metoclopramide (REGLAN) 10 MG tablet Take 1 tablet by mouth 4 times daily as needed (nausea - before meals and at bedtime) 1/29/24  Yes Michael Gallo DO   nicotine (NICODERM CQ) 21 MG/24HR Place 1 patch onto the skin daily 1/30/24  Yes Michael Gallo DO   pantoprazole (PROTONIX) 40 MG tablet Take 1 tablet by mouth 2 times daily (before meals) 1/29/24  Yes Michael Gallo DO   metFORMIN (GLUCOPHAGE) 500 MG tablet Take 1 tablet by mouth daily With dinner   Yes Leonardo Vences MD   clonazePAM (KLONOPIN) 0.5 MG tablet Take 1 tablet by mouth See Admin Instructions. Indications: Panic Disorder Take 1 tablet (0.5 mg) by mouth every morning, then 1/2 tablet (0.25 mg) at 2pm and 3 tablets (1.5 mg) at bedtime for panic disorder.   Yes Leonardo Vences MD   benztropine (COGENTIN) 2 MG tablet Take 1 tablet by mouth 2 times daily One in morning one at night   Yes Leonardo Vences MD 
sputum production.    Cardiac: Denies chest pain, SOB, diaphoresis or palpitations.    GI: No nausea, vomiting, diarrhea or constipation.  No melena or hematochezia.    : No urinary complaints, dysuria, hematuria or frequency.    MSK: No extremity weakness, paralysis or paresthesias.     PHYSICAL EXAM:    Vitals:  /61   Pulse 97   Temp 98.2 °F (36.8 °C) (Axillary)   Resp 17   Ht 1.626 m (5' 4.02\")   Wt 70.4 kg (155 lb 4.8 oz)   SpO2 97%   BMI 26.64 kg/m²     General:  This is a 68 y.o. yo male who is alert and oriented in NAD but patient minimally verbally responsive  HEENT:  Head is normocephalic and atraumatic, PERRLA, EOMI, mucus membranes moist with no pharyngeal erythema or exudate.  Neck:  Supple with no carotid bruits, JVD or thyromegaly.  No cervical adenopathy  CV:  Regular rate and rhythm, no murmurs  Lungs: Coarse breath sounds to auscultation bilaterally with no wheezes, rales or rhonchi  Abdomen:  Soft, nontender, nondistended, bowel sounds present  Extremities:  No edema, peripheral pulses intact bilaterally  Neuro:  Cranial nerves II-XII grossly intact; motor and sensory function intact with no focal deficits  Skin:  No rashes, lesions or wounds    DATA:  CBC with Differential:    Lab Results   Component Value Date/Time    WBC 16.5 01/22/2024 06:14 AM    RBC 4.08 01/22/2024 06:14 AM    HGB 12.7 01/22/2024 06:14 AM    HCT 38.7 01/22/2024 06:14 AM     01/22/2024 06:14 AM    MCV 94.9 01/22/2024 06:14 AM    MCH 31.1 01/22/2024 06:14 AM    MCHC 32.8 01/22/2024 06:14 AM    RDW 13.3 01/22/2024 06:14 AM    LYMPHOPCT 10 01/11/2024 08:29 AM    MONOPCT 4 01/11/2024 08:29 AM    BASOPCT 0 01/11/2024 08:29 AM    MONOSABS 0.41 01/11/2024 08:29 AM    LYMPHSABS 0.90 01/11/2024 08:29 AM    EOSABS 0 01/11/2024 08:29 AM    BASOSABS 0 01/11/2024 08:29 AM     CMP:    Lab Results   Component Value Date/Time     01/22/2024 06:14 AM    K 3.5 01/22/2024 06:14 AM     01/22/2024 06:14 AM    CO2 
01/13/2024 06:00 PM    PROTEINU 100 01/13/2024 06:00 PM    PHUR 5.5 01/13/2024 06:00 PM    WBCUA 0 TO 5 01/13/2024 06:00 PM    RBCUA 6 TO 9 01/13/2024 06:00 PM    BACTERIA 1+ 01/13/2024 06:00 PM    SPECGRAV 1.025 01/13/2024 06:00 PM    LEUKOCYTESUR NEGATIVE 01/13/2024 06:00 PM    UROBILINOGEN 0.2 01/13/2024 06:00 PM    BILIRUBINUR NEGATIVE 01/13/2024 06:00 PM    GLUCOSEU NEGATIVE 01/13/2024 06:00 PM     ABG:    Lab Results   Component Value Date/Time    PH 7.484 01/08/2024 02:12 AM    PCO2 29.6 01/08/2024 02:12 AM    PO2 67.1 01/08/2024 02:12 AM    HCO3 21.7 01/08/2024 02:12 AM    BE -0.5 01/08/2024 02:12 AM    O2SAT 94.5 01/08/2024 02:12 AM     HgBA1c:    Lab Results   Component Value Date/Time    LABA1C 6.3 01/08/2024 12:04 PM     FLP:    Lab Results   Component Value Date/Time    TRIG 76 01/09/2024 06:48 AM    HDL 47 01/09/2024 06:48 AM     TSH:    Lab Results   Component Value Date/Time    TSH 3.24 01/09/2024 06:48 AM     IRON:  No results found for: \"IRON\"  LIPASE:    Lab Results   Component Value Date/Time    LIPASE 23 01/08/2024 02:00 AM       ASSESSMENT AND PLAN:      Patient Active Problem List    Diagnosis Date Noted    Altered mental status 01/08/2024     Impression:  1.  Altered mental status/acute metabolic encephalopathy  2.  Acute influenza A respiratory infection  3.  Acute UTI  4.  Acute hypoxic respiratory failure  5.  History of paranoid schizophrenia  6.  History of non-insulin-dependent diabetes mellitus  7.  Acute kidney injury-baseline creatinine 1.2  8.  Current tobacco abuse with probable underlying COPD  9.  Acute diarrhea  10.  Nausea/vomiting  11.  Possible aspiration pneumonia    Plan:  Patient admitted to [facility]  Home medication reviewed  Monitor heart rate, blood pressure, O2 saturation  Contact and droplet isolation  Glucoscans 4 times daily with sliding scale insulin  O2 nasal cannula-titrate to keep O2 sat 90-96%  Tamiflu 75 mg twice daily and adjust for renal function  Lovenox 40 mg 
KATHRYN Gallo DO, D.O.  1/27/2024  12:09 PM

## 2024-02-06 ENCOUNTER — OUTSIDE SERVICES (OUTPATIENT)
Dept: INTERNAL MEDICINE CLINIC | Age: 69
End: 2024-02-06

## 2024-02-06 DIAGNOSIS — E87.6 HYPOKALEMIA: ICD-10-CM

## 2024-02-06 DIAGNOSIS — R13.10 DYSPHAGIA, UNSPECIFIED TYPE: ICD-10-CM

## 2024-02-06 DIAGNOSIS — J44.9 CHRONIC OBSTRUCTIVE PULMONARY DISEASE, UNSPECIFIED COPD TYPE (HCC): ICD-10-CM

## 2024-02-06 DIAGNOSIS — R41.82 ALTERED MENTAL STATUS, UNSPECIFIED ALTERED MENTAL STATUS TYPE: Primary | ICD-10-CM

## 2024-02-06 DIAGNOSIS — N17.8 OTHER ACUTE KIDNEY FAILURE (HCC): ICD-10-CM

## 2024-02-06 DIAGNOSIS — G93.41 METABOLIC ENCEPHALOPATHY: ICD-10-CM

## 2024-02-06 DIAGNOSIS — E11.9 TYPE 2 DIABETES MELLITUS WITHOUT COMPLICATION, UNSPECIFIED WHETHER LONG TERM INSULIN USE (HCC): ICD-10-CM

## 2024-02-06 DIAGNOSIS — F20.0 PARANOID SCHIZOPHRENIA (HCC): ICD-10-CM

## 2024-02-06 DIAGNOSIS — N39.0 URINARY TRACT INFECTION WITHOUT HEMATURIA, SITE UNSPECIFIED: ICD-10-CM

## 2024-02-06 DIAGNOSIS — K21.9 GASTROESOPHAGEAL REFLUX DISEASE WITHOUT ESOPHAGITIS: ICD-10-CM

## 2024-02-06 DIAGNOSIS — N13.9 OBSTRUCTIVE AND REFLUX UROPATHY, UNSPECIFIED: ICD-10-CM

## 2024-02-06 DIAGNOSIS — F05 DELIRIUM DUE TO KNOWN PHYSIOLOGICAL CONDITION: ICD-10-CM

## 2024-02-13 VITALS
HEART RATE: 74 BPM | OXYGEN SATURATION: 98 % | TEMPERATURE: 98 F | RESPIRATION RATE: 20 BRPM | BODY MASS INDEX: 21.31 KG/M2 | WEIGHT: 124.2 LBS | SYSTOLIC BLOOD PRESSURE: 118 MMHG | DIASTOLIC BLOOD PRESSURE: 66 MMHG

## 2024-02-13 RX ORDER — BISACODYL 10 MG
10 SUPPOSITORY, RECTAL RECTAL DAILY PRN
COMMUNITY

## 2024-02-13 RX ORDER — TAMSULOSIN HYDROCHLORIDE 0.4 MG/1
0.4 CAPSULE ORAL DAILY
COMMUNITY

## 2024-02-13 RX ORDER — ENEMA 19; 7 G/133ML; G/133ML
1 ENEMA RECTAL
COMMUNITY

## 2024-02-13 RX ORDER — LOPERAMIDE HYDROCHLORIDE 2 MG/1
2 CAPSULE ORAL 4 TIMES DAILY PRN
COMMUNITY

## 2024-02-13 RX ORDER — MAGNESIUM HYDROXIDE/ALUMINUM HYDROXICE/SIMETHICONE 120; 1200; 1200 MG/30ML; MG/30ML; MG/30ML
15 SUSPENSION ORAL EVERY 4 HOURS PRN
COMMUNITY

## 2024-02-13 NOTE — PROGRESS NOTES
HS  5. Dysphagia, unspecified type  6. Hypokalemia  7. Other acute kidney failure (HCC)  8. Chronic obstructive pulmonary disease, unspecified COPD type (HCC)  9. Delirium due to known physiological condition  10. Obstructive and reflux uropathy, unspecified  Comments:  Tamsulosin 0.4 mg daily  11. Gastroesophageal reflux disease without esophagitis  Comments:  Pantoprazole 40 mg every morning and at bedtime  12. Type 2 diabetes mellitus without complication, unspecified whether long term insulin use (HCC)  Comments:  Metformin 500 mg twice daily       Kathrine CARPIO  am scribing for Dr. Awadalla Alonna Pratt   I, Bahaa A Awadalla, MD, personally performed the services described in this documentation as scribed by Kathrine Garduno and it is both accurate and complete

## 2024-03-20 ENCOUNTER — HOSPITAL ENCOUNTER (EMERGENCY)
Age: 69
Discharge: HOME | End: 2024-03-20
Attending: EMERGENCY MEDICINE
Payer: OTHER GOVERNMENT

## 2024-03-20 DIAGNOSIS — T83.011A MALFUNCTION OF FOLEY CATHETER, INITIAL ENCOUNTER (HCC): Primary | ICD-10-CM

## 2024-03-20 PROCEDURE — 99283 EMERGENCY DEPT VISIT LOW MDM: CPT

## 2024-03-20 PROCEDURE — 51702 INSERT TEMP BLADDER CATH: CPT

## 2024-03-20 ASSESSMENT — PAIN - FUNCTIONAL ASSESSMENT: PAIN_FUNCTIONAL_ASSESSMENT: NONE - DENIES PAIN

## 2024-03-20 NOTE — ED PROVIDER NOTES
[facility] EMERGENCY DEPARTMENT  EMERGENCY DEPARTMENT ENCOUNTER        Pt Name: Aureliano Falcon  MRN: [id number]  Birthdate 1955  Date of evaluation: 3/20/2024  Provider: Aman Lee DO  PCP: Toshia Perea APRN - CNP  Note Started: 7:58 AM EDT 3/20/24    CHIEF COMPLAINT       Chief Complaint   Patient presents with    no flow to eaton       HISTORY OF PRESENT ILLNESS: 1 or more Elements   History From: PATIENT     Limitations to history : NONVERBAL AT BASELINE     Aureliano Falcon is a 68 y.o. male with history of schizophrenia who is nonverbal at baseline arriving from [facility] after there was no urine output from Eaton catheter for 12 hours.  Nursing removed catheter and attempted to reinsert Eaton without success so he was sent to our emergency department.      Nursing Notes were all reviewed and agreed with or any disagreements were addressed in the HPI.    REVIEW OF SYSTEMS :      Review of Systems    UNABLE TO ASSES DUE TO MENTAL STATUS     SURGICAL HISTORY     Past Surgical History:   Procedure Laterality Date    UPPER GASTROINTESTINAL ENDOSCOPY N/A 1/19/2024    EGD BIOPSY performed by Temo Barnett MD at [facility] ENDOSCOPY       CURRENTMEDICATIONS       Discharge Medication List as of 3/20/2024  8:51 AM        CONTINUE these medications which have NOT CHANGED    Details   magnesium hydroxide (MILK OF MAGNESIA) 400 MG/5ML suspension Take 30 mLs by mouth daily as needed for ConstipationHistorical Med      bisacodyl (DULCOLAX) 10 MG suppository Place 1 suppository rectally daily as needed for ConstipationHistorical Med      sodium phosphate (FLEET) 7-19 GM/118ML Place 1 enema rectally once as neededHistorical Med      aluminum & magnesium hydroxide-simethicone (MAALOX) 200-200-20 MG/5ML SUSP suspension Take 15 mLs by mouth every 4 hours as needed for IndigestionHistorical Med      loperamide (IMODIUM) 2 MG capsule Take 1 capsule by mouth 4 times daily as

## 2024-03-26 ENCOUNTER — OUTSIDE SERVICES (OUTPATIENT)
Dept: INTERNAL MEDICINE CLINIC | Age: 69
End: 2024-03-26

## 2024-03-26 DIAGNOSIS — G93.41 METABOLIC ENCEPHALOPATHY: ICD-10-CM

## 2024-03-26 DIAGNOSIS — R41.82 ALTERED MENTAL STATUS, UNSPECIFIED ALTERED MENTAL STATUS TYPE: Primary | ICD-10-CM

## 2024-03-26 DIAGNOSIS — E87.6 HYPOKALEMIA: ICD-10-CM

## 2024-03-26 DIAGNOSIS — N17.8 OTHER ACUTE KIDNEY FAILURE (HCC): ICD-10-CM

## 2024-03-26 DIAGNOSIS — R13.10 DYSPHAGIA, UNSPECIFIED TYPE: ICD-10-CM

## 2024-03-26 DIAGNOSIS — N13.9 OBSTRUCTIVE AND REFLUX UROPATHY, UNSPECIFIED: ICD-10-CM

## 2024-03-26 DIAGNOSIS — F05 DELIRIUM DUE TO KNOWN PHYSIOLOGICAL CONDITION: ICD-10-CM

## 2024-03-26 DIAGNOSIS — J44.9 CHRONIC OBSTRUCTIVE PULMONARY DISEASE, UNSPECIFIED COPD TYPE (HCC): ICD-10-CM

## 2024-03-26 DIAGNOSIS — N39.0 URINARY TRACT INFECTION WITHOUT HEMATURIA, SITE UNSPECIFIED: ICD-10-CM

## 2024-03-26 DIAGNOSIS — E11.9 TYPE 2 DIABETES MELLITUS WITHOUT COMPLICATION, UNSPECIFIED WHETHER LONG TERM INSULIN USE (HCC): ICD-10-CM

## 2024-03-26 DIAGNOSIS — F20.0 PARANOID SCHIZOPHRENIA (HCC): ICD-10-CM

## 2024-03-26 DIAGNOSIS — K21.9 GASTROESOPHAGEAL REFLUX DISEASE WITHOUT ESOPHAGITIS: ICD-10-CM

## 2024-03-26 PROCEDURE — 99309 SBSQ NF CARE MODERATE MDM 30: CPT | Performed by: INTERNAL MEDICINE

## 2024-04-22 VITALS
TEMPERATURE: 97.9 F | BODY MASS INDEX: 20.72 KG/M2 | SYSTOLIC BLOOD PRESSURE: 129 MMHG | HEART RATE: 90 BPM | OXYGEN SATURATION: 95 % | WEIGHT: 120.8 LBS | RESPIRATION RATE: 18 BRPM | DIASTOLIC BLOOD PRESSURE: 77 MMHG

## 2024-04-22 RX ORDER — PYRIDOXINE HCL (VITAMIN B6) 100 MG
500 TABLET ORAL EVERY MORNING
COMMUNITY

## 2024-04-22 RX ORDER — ASCORBIC ACID 500 MG
500 TABLET ORAL DAILY
COMMUNITY

## 2024-04-22 RX ORDER — GINSENG 100 MG
50 CAPSULE ORAL DAILY
COMMUNITY

## 2024-04-22 RX ORDER — ACETAMINOPHEN 325 MG/1
650 TABLET ORAL EVERY 4 HOURS PRN
COMMUNITY

## 2024-04-22 RX ORDER — SULFAMETHOXAZOLE AND TRIMETHOPRIM 800; 160 MG/1; MG/1
1 TABLET ORAL 2 TIMES DAILY
COMMUNITY

## 2024-04-22 RX ORDER — DRONABINOL 5 MG/1
5 CAPSULE ORAL 2 TIMES DAILY
COMMUNITY

## 2024-04-23 ENCOUNTER — OUTSIDE SERVICES (OUTPATIENT)
Dept: INTERNAL MEDICINE CLINIC | Age: 69
End: 2024-04-23

## 2024-04-23 DIAGNOSIS — E87.6 HYPOKALEMIA: ICD-10-CM

## 2024-04-23 DIAGNOSIS — J44.9 CHRONIC OBSTRUCTIVE PULMONARY DISEASE, UNSPECIFIED COPD TYPE (HCC): ICD-10-CM

## 2024-04-23 DIAGNOSIS — R41.82 ALTERED MENTAL STATUS, UNSPECIFIED ALTERED MENTAL STATUS TYPE: Primary | ICD-10-CM

## 2024-04-23 DIAGNOSIS — E11.9 TYPE 2 DIABETES MELLITUS WITHOUT COMPLICATION, UNSPECIFIED WHETHER LONG TERM INSULIN USE (HCC): ICD-10-CM

## 2024-04-23 DIAGNOSIS — G93.41 METABOLIC ENCEPHALOPATHY: ICD-10-CM

## 2024-04-23 DIAGNOSIS — N17.8 OTHER ACUTE KIDNEY FAILURE (HCC): ICD-10-CM

## 2024-04-23 DIAGNOSIS — F20.0 PARANOID SCHIZOPHRENIA (HCC): ICD-10-CM

## 2024-04-23 DIAGNOSIS — F05 DELIRIUM DUE TO KNOWN PHYSIOLOGICAL CONDITION: ICD-10-CM

## 2024-04-23 DIAGNOSIS — G24.01 TARDIVE DYSKINESIA: ICD-10-CM

## 2024-04-23 DIAGNOSIS — K21.9 GASTROESOPHAGEAL REFLUX DISEASE WITHOUT ESOPHAGITIS: ICD-10-CM

## 2024-04-23 DIAGNOSIS — H11.32 SUBCONJUNCTIVAL HEMORRHAGE OF LEFT EYE: ICD-10-CM

## 2024-04-23 DIAGNOSIS — N13.9 OBSTRUCTIVE AND REFLUX UROPATHY, UNSPECIFIED: ICD-10-CM

## 2024-04-23 DIAGNOSIS — R13.10 DYSPHAGIA, UNSPECIFIED TYPE: ICD-10-CM

## 2024-04-23 PROCEDURE — 99309 SBSQ NF CARE MODERATE MDM 30: CPT | Performed by: INTERNAL MEDICINE

## 2024-04-23 NOTE — PROGRESS NOTES
nebulizer solution Inhale 3 mLs into the lungs every 6 hours as needed for Shortness of Breath 360 mL     ondansetron (ZOFRAN-ODT) 4 MG disintegrating tablet Take 1 tablet by mouth every 8 hours as needed for Nausea or Vomiting      metoclopramide (REGLAN) 10 MG tablet Take 1 tablet by mouth 4 times daily as needed (nausea - before meals and at bedtime) 120 tablet 3    nicotine (NICODERM CQ) 21 MG/24HR Place 1 patch onto the skin daily 30 patch 3    pantoprazole (PROTONIX) 40 MG tablet Take 1 tablet by mouth 2 times daily (before meals) 30 tablet 3    metFORMIN (GLUCOPHAGE) 500 MG tablet Take 1 tablet by mouth daily With dinner      clonazePAM (KLONOPIN) 0.5 MG tablet Take 1 tablet by mouth See Admin Instructions. Indications: Panic Disorder Take 1 tablet (0.5 mg) by mouth every morning, then 1/2 tablet (0.25 mg) at 2pm and 3 tablets (1.5 mg) at bedtime for panic disorder.      benztropine (COGENTIN) 2 MG tablet Take 1 tablet by mouth 2 times daily One in morning one at night      cloZAPine (CLOZARIL) 100 MG tablet Take 1 tablet by mouth See Admin Instructions Take 1 tab (100 mg) by mouth every morning and then 2 tablets by mouth (200 mg) at bedtime.       No current facility-administered medications for this visit.       Current Meds: Refer to nursing home record    PMH:    Medical Problems: reviewed and updated       Diagnosis Date    Smoker 01/08/2024    4 cartons per month        Surgical Hx: reviewed and updated   Past Surgical History:   Procedure Laterality Date    UPPER GASTROINTESTINAL ENDOSCOPY N/A 1/19/2024    EGD BIOPSY performed by Temo Barnett MD at [facility] ENDOSCOPY        FH: reviewed and updated   No family history on file.     SH: reviewed and updated    reports that he has been smoking cigarettes. He started smoking about 44 years ago. He has a 44.3 pack-year smoking history. He has never been exposed to tobacco smoke. He has never used smokeless tobacco. Alcohol use questions deferred to the

## 2024-06-04 ENCOUNTER — OUTSIDE SERVICES (OUTPATIENT)
Dept: INTERNAL MEDICINE CLINIC | Age: 69
End: 2024-06-04
Payer: MEDICARE

## 2024-06-04 DIAGNOSIS — E11.9 TYPE 2 DIABETES MELLITUS WITHOUT COMPLICATION, UNSPECIFIED WHETHER LONG TERM INSULIN USE (HCC): ICD-10-CM

## 2024-06-04 DIAGNOSIS — J44.9 CHRONIC OBSTRUCTIVE PULMONARY DISEASE, UNSPECIFIED COPD TYPE (HCC): ICD-10-CM

## 2024-06-04 DIAGNOSIS — R13.10 DYSPHAGIA, UNSPECIFIED TYPE: ICD-10-CM

## 2024-06-04 DIAGNOSIS — F20.0 PARANOID SCHIZOPHRENIA (HCC): Primary | ICD-10-CM

## 2024-06-04 DIAGNOSIS — N13.9 OBSTRUCTIVE AND REFLUX UROPATHY, UNSPECIFIED: ICD-10-CM

## 2024-06-04 DIAGNOSIS — K21.9 GASTROESOPHAGEAL REFLUX DISEASE WITHOUT ESOPHAGITIS: ICD-10-CM

## 2024-06-04 PROCEDURE — 99308 SBSQ NF CARE LOW MDM 20: CPT | Performed by: INTERNAL MEDICINE

## 2024-06-04 NOTE — PROGRESS NOTES
physician. Drug use questions deferred to the physician.     Objective:  There were no vitals taken for this visit.     Physical Exam  Constitutional:       General: He is awake. He is not in acute distress.     Appearance: Normal appearance.   HENT:      Head: Normocephalic and atraumatic.      Right Ear: External ear normal.      Left Ear: External ear normal.      Nose: Nose normal.   Eyes:      Extraocular Movements: Extraocular movements intact.      Pupils: Pupils are equal, round, and reactive to light.   Cardiovascular:      Rate and Rhythm: Normal rate and regular rhythm.      Heart sounds: S1 normal and S2 normal. No murmur heard.     No friction rub. No gallop.   Pulmonary:      Breath sounds: Normal breath sounds.      Comments: Clear to ausculation bilaterally  Non-traumatic  Chest:      Chest wall: No tenderness.   Abdominal:      General: Bowel sounds are normal. There is no distension.      Palpations: Abdomen is soft. There is no mass.      Tenderness: There is no abdominal tenderness.      Comments: No organomegaly   Genitourinary:     Comments: Aldana catheter present  Musculoskeletal:         General: No tenderness. Normal range of motion.      Cervical back: Neck supple. No tenderness.      Right lower leg: No edema.      Left lower leg: No edema.      Comments: No clubbing, No cyanosis   Skin:     General: Skin is warm and dry.   Neurological:      General: No focal deficit present.      Mental Status: He is alert.      Motor: Weakness present.      Gait: Gait abnormal.         Assessment & Plan:  1. Paranoid schizophrenia ([facility])  2. Dysphagia, unspecified type  3. Gastroesophageal reflux disease without esophagitis  4. Chronic obstructive pulmonary disease, unspecified COPD type ([facility])  5. Obstructive and reflux uropathy, unspecified  6. Type 2 diabetes mellitus without complication, unspecified whether long term insulin use ([facility])    Continue current management,    Bahaa A Awadalla, MD     
(4) rarely moist

## 2024-06-17 VITALS
RESPIRATION RATE: 18 BRPM | HEART RATE: 85 BPM | WEIGHT: 106.4 LBS | BODY MASS INDEX: 18.25 KG/M2 | DIASTOLIC BLOOD PRESSURE: 73 MMHG | TEMPERATURE: 97.9 F | SYSTOLIC BLOOD PRESSURE: 119 MMHG | OXYGEN SATURATION: 96 %

## 2024-06-17 NOTE — PROGRESS NOTES
discharge) present.   Neurological:      General: No focal deficit present.      Mental Status: He is alert. Mental status is at baseline.      Motor: Weakness present.      Gait: Gait abnormal.      Comments: Tardive dyskinesia         Assessment & Plan:  1. Altered mental status, unspecified altered mental status type  2. Hx of Metabolic encephalopathy  3. Paranoid schizophrenia ([facility])  Comments:  Continue dlozapine, clonazepam, and benztropine  4. Dysphagia, unspecified type  5. Hypokalemia  6. Other acute kidney failure ([facility])  7. Chronic obstructive pulmonary disease, unspecified COPD type ([facility])  8. Delirium due to known physiological condition  9. Obstructive and reflux uropathy, unspecified  Comments:  Continue tamsulosin  10. Gastroesophageal reflux disease without esophagitis  Comments:  Continue pantoprazole  11. Type 2 diabetes mellitus without complication, unspecified whether long term insulin use ([facility])  Comments:  Continue metformin  12. Tardive dyskinesia  13. Subconjunctival hemorrhage of left eye  Comments:  Refresh Tears BID x 5 days  14.  Abscess of the skin of the lumbar region.  Patient following up with wound care closely.  Continue with current course of p.o. Bactrim.       Kathrine CARPIO  am scribing for Dr. Awadalla Alonna Pratt I, Bahaa A Awadalla, MD, personally performed the services described in this documentation as scribed by Kathrine Garduno and it is both accurate and complete

## 2024-07-23 ENCOUNTER — OUTSIDE SERVICES (OUTPATIENT)
Dept: INTERNAL MEDICINE CLINIC | Age: 69
End: 2024-07-23
Payer: MEDICARE

## 2024-07-23 DIAGNOSIS — R41.82 ALTERED MENTAL STATUS, UNSPECIFIED ALTERED MENTAL STATUS TYPE: ICD-10-CM

## 2024-07-23 DIAGNOSIS — N13.9 OBSTRUCTIVE AND REFLUX UROPATHY, UNSPECIFIED: ICD-10-CM

## 2024-07-23 DIAGNOSIS — J44.9 CHRONIC OBSTRUCTIVE PULMONARY DISEASE, UNSPECIFIED COPD TYPE (HCC): ICD-10-CM

## 2024-07-23 DIAGNOSIS — G93.41 METABOLIC ENCEPHALOPATHY: ICD-10-CM

## 2024-07-23 DIAGNOSIS — F20.0 PARANOID SCHIZOPHRENIA (HCC): Primary | ICD-10-CM

## 2024-07-23 DIAGNOSIS — E11.9 TYPE 2 DIABETES MELLITUS WITHOUT COMPLICATION, UNSPECIFIED WHETHER LONG TERM INSULIN USE (HCC): ICD-10-CM

## 2024-07-23 DIAGNOSIS — R13.10 DYSPHAGIA, UNSPECIFIED TYPE: ICD-10-CM

## 2024-07-23 DIAGNOSIS — K21.9 GASTROESOPHAGEAL REFLUX DISEASE WITHOUT ESOPHAGITIS: ICD-10-CM

## 2024-07-23 PROCEDURE — 99308 SBSQ NF CARE LOW MDM 20: CPT | Performed by: INTERNAL MEDICINE

## 2024-07-23 NOTE — PROGRESS NOTES
Visit Date: 7/23/2024 - [facility]  Aureliano Falcon  1955  male 69 y.o.      Subjective:    CC: Patient presents not very communicative. He has tardive dyskinesia with abnormal movements of his head and both upper extremities.     HPI: Patient is seen and examined. Medications on chart reviewed. Labs are reviewed. Discussed with nursing staff.       ROS:  Review of Systems   Reason unable to perform ROS: Patient is not very communicative. Nursing staff reports no issues.      Current Outpatient Medications   Medication Sig Dispense Refill    vitamin C (ASCORBIC ACID) 500 MG tablet Take 1 tablet by mouth daily      zinc 50 MG TABS tablet Take 1 tablet by mouth daily      Cranberry 500 MG CAPS Take 1 capsule by mouth every morning      droNABinol (MARINOL) 5 MG capsule Take 1 capsule by mouth 2 times daily. Max Daily Amount: 10 mg      sulfamethoxazole-trimethoprim (BACTRIM DS;SEPTRA DS) 800-160 MG per tablet Take 1 tablet by mouth 2 times daily      acetaminophen (TYLENOL) 325 MG tablet Take 2 tablets by mouth every 4 hours as needed for Pain or Fever      magnesium hydroxide (MILK OF MAGNESIA) 400 MG/5ML suspension Take 30 mLs by mouth daily as needed for Constipation      bisacodyl (DULCOLAX) 10 MG suppository Place 1 suppository rectally daily as needed for Constipation      sodium phosphate (FLEET) 7-19 GM/118ML Place 1 enema rectally once as needed      aluminum & magnesium hydroxide-simethicone (MAALOX) 200-200-20 MG/5ML SUSP suspension Take 15 mLs by mouth every 4 hours as needed for Indigestion      loperamide (IMODIUM) 2 MG capsule Take 1 capsule by mouth 4 times daily as needed for Diarrhea Give 2 capsule by mouth as needed for Loose Stools May give 4 mg once, followed by 2 mg thereafter AND Give 1 capsule by mouth as needed for Loose Stools Do not exceed 8 mg/12 hours      tamsulosin (FLOMAX) 0.4 MG capsule Take 1 capsule by mouth daily      ipratropium 0.5 mg-albuterol 2.5 mg (DUONEB) 0.5-2.5 (3) MG/3ML SOLN

## 2024-08-08 ENCOUNTER — APPOINTMENT (OUTPATIENT)
Dept: CT IMAGING | Age: 69
End: 2024-08-08
Payer: OTHER GOVERNMENT

## 2024-08-08 ENCOUNTER — APPOINTMENT (OUTPATIENT)
Dept: GENERAL RADIOLOGY | Age: 69
End: 2024-08-08
Payer: OTHER GOVERNMENT

## 2024-08-08 ENCOUNTER — HOSPITAL ENCOUNTER (INPATIENT)
Age: 69
LOS: 7 days | Discharge: NURSING, REHAB OR LONG-TERM CARE | End: 2024-08-15
Attending: EMERGENCY MEDICINE | Admitting: INTERNAL MEDICINE
Payer: OTHER GOVERNMENT

## 2024-08-08 DIAGNOSIS — E86.0 DEHYDRATION: ICD-10-CM

## 2024-08-08 DIAGNOSIS — E87.0 HYPERNATREMIA: ICD-10-CM

## 2024-08-08 DIAGNOSIS — N17.9 ACUTE RENAL FAILURE, UNSPECIFIED ACUTE RENAL FAILURE TYPE (HCC): ICD-10-CM

## 2024-08-08 DIAGNOSIS — J18.9 PNEUMONIA DUE TO INFECTIOUS ORGANISM, UNSPECIFIED LATERALITY, UNSPECIFIED PART OF LUNG: ICD-10-CM

## 2024-08-08 DIAGNOSIS — R41.82 ALTERED MENTAL STATUS, UNSPECIFIED ALTERED MENTAL STATUS TYPE: Primary | ICD-10-CM

## 2024-08-08 DIAGNOSIS — N30.00 ACUTE CYSTITIS WITHOUT HEMATURIA: ICD-10-CM

## 2024-08-08 DIAGNOSIS — A41.9 SEPTICEMIA (HCC): ICD-10-CM

## 2024-08-08 LAB
ALBUMIN SERPL-MCNC: 2.8 G/DL (ref 3.5–5.2)
ALP SERPL-CCNC: 138 U/L (ref 40–129)
ALT SERPL-CCNC: 11 U/L (ref 0–40)
ANION GAP SERPL CALCULATED.3IONS-SCNC: 11 MMOL/L (ref 7–16)
AST SERPL-CCNC: 22 U/L (ref 0–39)
B-OH-BUTYR SERPL-MCNC: 0.13 MMOL/L (ref 0.02–0.27)
BACTERIA URNS QL MICRO: ABNORMAL
BASOPHILS # BLD: 0.03 K/UL (ref 0–0.2)
BASOPHILS NFR BLD: 0 % (ref 0–2)
BILIRUB SERPL-MCNC: 0.2 MG/DL (ref 0–1.2)
BILIRUB UR QL STRIP: NEGATIVE
BUN SERPL-MCNC: 86 MG/DL (ref 6–23)
CALCIUM SERPL-MCNC: 8.8 MG/DL (ref 8.6–10.2)
CHLORIDE SERPL-SCNC: 130 MMOL/L (ref 98–107)
CLARITY UR: CLEAR
CO2 SERPL-SCNC: 27 MMOL/L (ref 22–29)
COLOR UR: YELLOW
CREAT SERPL-MCNC: 3 MG/DL (ref 0.7–1.2)
EKG ATRIAL RATE: 113 BPM
EKG P AXIS: 56 DEGREES
EKG P-R INTERVAL: 112 MS
EKG Q-T INTERVAL: 288 MS
EKG QRS DURATION: 60 MS
EKG QTC CALCULATION (BAZETT): 395 MS
EKG R AXIS: 91 DEGREES
EKG T AXIS: 56 DEGREES
EKG VENTRICULAR RATE: 113 BPM
EOSINOPHIL # BLD: 0.02 K/UL (ref 0.05–0.5)
EOSINOPHILS RELATIVE PERCENT: 0 % (ref 0–6)
ERYTHROCYTE [DISTWIDTH] IN BLOOD BY AUTOMATED COUNT: 16.4 % (ref 11.5–15)
GFR, ESTIMATED: 22 ML/MIN/1.73M2
GLUCOSE SERPL-MCNC: 104 MG/DL (ref 74–99)
GLUCOSE UR STRIP-MCNC: NEGATIVE MG/DL
HCT VFR BLD AUTO: 36.7 % (ref 37–54)
HGB BLD-MCNC: 10.4 G/DL (ref 12.5–16.5)
HGB UR QL STRIP.AUTO: ABNORMAL
IMM GRANULOCYTES # BLD AUTO: 0.06 K/UL (ref 0–0.58)
IMM GRANULOCYTES NFR BLD: 1 % (ref 0–5)
IRON SATN MFR SERPL: 16 % (ref 20–55)
IRON SERPL-MCNC: 28 UG/DL (ref 59–158)
KETONES UR STRIP-MCNC: NEGATIVE MG/DL
LACTATE BLDV-SCNC: 2 MMOL/L (ref 0.5–1.9)
LACTATE BLDV-SCNC: 2.1 MMOL/L (ref 0.5–1.9)
LEUKOCYTE ESTERASE UR QL STRIP: ABNORMAL
LYMPHOCYTES NFR BLD: 1.28 K/UL (ref 1.5–4)
LYMPHOCYTES RELATIVE PERCENT: 11 % (ref 20–42)
MCH RBC QN AUTO: 29.3 PG (ref 26–35)
MCHC RBC AUTO-ENTMCNC: 28.3 G/DL (ref 32–34.5)
MCV RBC AUTO: 103.4 FL (ref 80–99.9)
MONOCYTES NFR BLD: 0.41 K/UL (ref 0.1–0.95)
MONOCYTES NFR BLD: 3 % (ref 2–12)
NEUTROPHILS NFR BLD: 85 % (ref 43–80)
NEUTS SEG NFR BLD: 10.3 K/UL (ref 1.8–7.3)
NITRITE UR QL STRIP: NEGATIVE
OSMOLALITY SERPL: 374 MOSM/KG (ref 285–310)
OSMOLALITY UR: 487 MOSM/KG (ref 300–900)
PH UR STRIP: 8.5 [PH] (ref 5–9)
PH VENOUS: 7.44 (ref 7.35–7.45)
PLATELET CONFIRMATION: NORMAL
PLATELET, FLUORESCENCE: 136 K/UL (ref 130–450)
PMV BLD AUTO: 12.7 FL (ref 7–12)
POTASSIUM SERPL-SCNC: 4.4 MMOL/L (ref 3.5–5)
PROCALCITONIN SERPL-MCNC: 0.54 NG/ML (ref 0–0.08)
PROT SERPL-MCNC: 7.2 G/DL (ref 6.4–8.3)
PROT UR STRIP-MCNC: 100 MG/DL
RBC # BLD AUTO: 3.55 M/UL (ref 3.8–5.8)
RBC # BLD: ABNORMAL 10*6/UL
RBC #/AREA URNS HPF: ABNORMAL /HPF
SARS-COV-2 RDRP RESP QL NAA+PROBE: NOT DETECTED
SODIUM SERPL-SCNC: 168 MMOL/L (ref 132–146)
SP GR UR STRIP: 1.01 (ref 1–1.03)
SPECIMEN DESCRIPTION: NORMAL
TIBC SERPL-MCNC: 176 UG/DL (ref 250–450)
TROPONIN I SERPL HS-MCNC: 73 NG/L (ref 0–11)
UROBILINOGEN UR STRIP-ACNC: 0.2 EU/DL (ref 0–1)
WBC #/AREA URNS HPF: ABNORMAL /HPF
WBC OTHER # BLD: 12.1 K/UL (ref 4.5–11.5)

## 2024-08-08 PROCEDURE — 87635 SARS-COV-2 COVID-19 AMP PRB: CPT

## 2024-08-08 PROCEDURE — 6360000002 HC RX W HCPCS: Performed by: EMERGENCY MEDICINE

## 2024-08-08 PROCEDURE — 36415 COLL VENOUS BLD VENIPUNCTURE: CPT

## 2024-08-08 PROCEDURE — 83605 ASSAY OF LACTIC ACID: CPT

## 2024-08-08 PROCEDURE — 2580000003 HC RX 258: Performed by: INTERNAL MEDICINE

## 2024-08-08 PROCEDURE — 84484 ASSAY OF TROPONIN QUANT: CPT

## 2024-08-08 PROCEDURE — 6360000002 HC RX W HCPCS: Performed by: INTERNAL MEDICINE

## 2024-08-08 PROCEDURE — 83935 ASSAY OF URINE OSMOLALITY: CPT

## 2024-08-08 PROCEDURE — 83550 IRON BINDING TEST: CPT

## 2024-08-08 PROCEDURE — 99285 EMERGENCY DEPT VISIT HI MDM: CPT

## 2024-08-08 PROCEDURE — 96374 THER/PROPH/DIAG INJ IV PUSH: CPT

## 2024-08-08 PROCEDURE — 2580000003 HC RX 258: Performed by: EMERGENCY MEDICINE

## 2024-08-08 PROCEDURE — 80053 COMPREHEN METABOLIC PANEL: CPT

## 2024-08-08 PROCEDURE — 82010 KETONE BODYS QUAN: CPT

## 2024-08-08 PROCEDURE — 71045 X-RAY EXAM CHEST 1 VIEW: CPT

## 2024-08-08 PROCEDURE — 87040 BLOOD CULTURE FOR BACTERIA: CPT

## 2024-08-08 PROCEDURE — 81001 URINALYSIS AUTO W/SCOPE: CPT

## 2024-08-08 PROCEDURE — 2580000003 HC RX 258

## 2024-08-08 PROCEDURE — 93005 ELECTROCARDIOGRAM TRACING: CPT | Performed by: EMERGENCY MEDICINE

## 2024-08-08 PROCEDURE — 84145 PROCALCITONIN (PCT): CPT

## 2024-08-08 PROCEDURE — 82607 VITAMIN B-12: CPT

## 2024-08-08 PROCEDURE — 93010 ELECTROCARDIOGRAM REPORT: CPT | Performed by: INTERNAL MEDICINE

## 2024-08-08 PROCEDURE — 74176 CT ABD & PELVIS W/O CONTRAST: CPT

## 2024-08-08 PROCEDURE — 82800 BLOOD PH: CPT

## 2024-08-08 PROCEDURE — 1200000000 HC SEMI PRIVATE

## 2024-08-08 PROCEDURE — 2500000003 HC RX 250 WO HCPCS: Performed by: EMERGENCY MEDICINE

## 2024-08-08 PROCEDURE — 82746 ASSAY OF FOLIC ACID SERUM: CPT

## 2024-08-08 PROCEDURE — 83540 ASSAY OF IRON: CPT

## 2024-08-08 PROCEDURE — 85025 COMPLETE CBC W/AUTO DIFF WBC: CPT

## 2024-08-08 PROCEDURE — 2500000003 HC RX 250 WO HCPCS: Performed by: INTERNAL MEDICINE

## 2024-08-08 PROCEDURE — 6370000000 HC RX 637 (ALT 250 FOR IP): Performed by: INTERNAL MEDICINE

## 2024-08-08 PROCEDURE — 70450 CT HEAD/BRAIN W/O DYE: CPT

## 2024-08-08 PROCEDURE — 83930 ASSAY OF BLOOD OSMOLALITY: CPT

## 2024-08-08 RX ORDER — CLONAZEPAM 0.5 MG/1
0.25 TABLET ORAL DAILY
Status: DISCONTINUED | OUTPATIENT
Start: 2024-08-09 | End: 2024-08-15 | Stop reason: HOSPADM

## 2024-08-08 RX ORDER — LOPERAMIDE HYDROCHLORIDE 2 MG/1
2 CAPSULE ORAL 4 TIMES DAILY PRN
Status: DISCONTINUED | OUTPATIENT
Start: 2024-08-08 | End: 2024-08-15 | Stop reason: HOSPADM

## 2024-08-08 RX ORDER — MAGNESIUM HYDROXIDE/ALUMINUM HYDROXICE/SIMETHICONE 120; 1200; 1200 MG/30ML; MG/30ML; MG/30ML
15 SUSPENSION ORAL EVERY 4 HOURS PRN
Status: DISCONTINUED | OUTPATIENT
Start: 2024-08-08 | End: 2024-08-15 | Stop reason: HOSPADM

## 2024-08-08 RX ORDER — DRONABINOL 5 MG/1
5 CAPSULE ORAL 2 TIMES DAILY
Status: DISCONTINUED | OUTPATIENT
Start: 2024-08-08 | End: 2024-08-08

## 2024-08-08 RX ORDER — PROCHLORPERAZINE EDISYLATE 5 MG/ML
5 INJECTION INTRAMUSCULAR; INTRAVENOUS EVERY 6 HOURS PRN
Status: DISCONTINUED | OUTPATIENT
Start: 2024-08-08 | End: 2024-08-15 | Stop reason: HOSPADM

## 2024-08-08 RX ORDER — ACETAMINOPHEN 650 MG/1
650 SUPPOSITORY RECTAL EVERY 4 HOURS PRN
Status: DISCONTINUED | OUTPATIENT
Start: 2024-08-08 | End: 2024-08-15 | Stop reason: HOSPADM

## 2024-08-08 RX ORDER — NICOTINE 21 MG/24HR
1 PATCH, TRANSDERMAL 24 HOURS TRANSDERMAL DAILY
Status: DISCONTINUED | OUTPATIENT
Start: 2024-08-08 | End: 2024-08-08

## 2024-08-08 RX ORDER — CLOZAPINE 100 MG/1
100 TABLET ORAL EVERY MORNING
Status: DISCONTINUED | OUTPATIENT
Start: 2024-08-09 | End: 2024-08-15 | Stop reason: HOSPADM

## 2024-08-08 RX ORDER — METOCLOPRAMIDE 5 MG/1
10 TABLET ORAL 4 TIMES DAILY PRN
Status: DISCONTINUED | OUTPATIENT
Start: 2024-08-08 | End: 2024-08-09

## 2024-08-08 RX ORDER — DEXTROSE MONOHYDRATE 50 MG/ML
INJECTION, SOLUTION INTRAVENOUS CONTINUOUS
Status: DISCONTINUED | OUTPATIENT
Start: 2024-08-08 | End: 2024-08-10

## 2024-08-08 RX ORDER — PYRIDOXINE HCL (VITAMIN B6) 100 MG
500 TABLET ORAL EVERY MORNING
Status: DISCONTINUED | OUTPATIENT
Start: 2024-08-09 | End: 2024-08-08 | Stop reason: CLARIF

## 2024-08-08 RX ORDER — CLONAZEPAM 0.5 MG/1
0.5 TABLET ORAL EVERY MORNING
Status: DISCONTINUED | OUTPATIENT
Start: 2024-08-09 | End: 2024-08-15 | Stop reason: HOSPADM

## 2024-08-08 RX ORDER — IPRATROPIUM BROMIDE AND ALBUTEROL SULFATE 2.5; .5 MG/3ML; MG/3ML
1 SOLUTION RESPIRATORY (INHALATION) EVERY 4 HOURS PRN
Status: DISCONTINUED | OUTPATIENT
Start: 2024-08-08 | End: 2024-08-15 | Stop reason: HOSPADM

## 2024-08-08 RX ORDER — ONDANSETRON 4 MG/1
4 TABLET, ORALLY DISINTEGRATING ORAL EVERY 8 HOURS PRN
Status: DISCONTINUED | OUTPATIENT
Start: 2024-08-08 | End: 2024-08-15 | Stop reason: HOSPADM

## 2024-08-08 RX ORDER — BENZTROPINE MESYLATE 1 MG/1
2 TABLET ORAL 2 TIMES DAILY
Status: DISCONTINUED | OUTPATIENT
Start: 2024-08-08 | End: 2024-08-15 | Stop reason: HOSPADM

## 2024-08-08 RX ORDER — TAMSULOSIN HYDROCHLORIDE 0.4 MG/1
0.4 CAPSULE ORAL DAILY
Status: DISCONTINUED | OUTPATIENT
Start: 2024-08-08 | End: 2024-08-15 | Stop reason: HOSPADM

## 2024-08-08 RX ORDER — SODIUM CHLORIDE 9 MG/ML
INJECTION, SOLUTION INTRAVENOUS
Status: COMPLETED
Start: 2024-08-08 | End: 2024-08-08

## 2024-08-08 RX ORDER — HEPARIN SODIUM 5000 [USP'U]/ML
5000 INJECTION, SOLUTION INTRAVENOUS; SUBCUTANEOUS 2 TIMES DAILY
Status: DISCONTINUED | OUTPATIENT
Start: 2024-08-08 | End: 2024-08-15 | Stop reason: HOSPADM

## 2024-08-08 RX ORDER — ZINC SULFATE 50(220)MG
50 CAPSULE ORAL DAILY
Status: DISCONTINUED | OUTPATIENT
Start: 2024-08-09 | End: 2024-08-08

## 2024-08-08 RX ORDER — 0.9 % SODIUM CHLORIDE 0.9 %
1000 INTRAVENOUS SOLUTION INTRAVENOUS ONCE
Status: COMPLETED | OUTPATIENT
Start: 2024-08-08 | End: 2024-08-08

## 2024-08-08 RX ORDER — ASCORBIC ACID 500 MG
500 TABLET ORAL DAILY
Status: DISCONTINUED | OUTPATIENT
Start: 2024-08-09 | End: 2024-08-08

## 2024-08-08 RX ORDER — ACETAMINOPHEN 325 MG/1
650 TABLET ORAL EVERY 4 HOURS PRN
Status: DISCONTINUED | OUTPATIENT
Start: 2024-08-08 | End: 2024-08-15 | Stop reason: HOSPADM

## 2024-08-08 RX ORDER — PANTOPRAZOLE SODIUM 40 MG/1
40 TABLET, DELAYED RELEASE ORAL
Status: DISCONTINUED | OUTPATIENT
Start: 2024-08-08 | End: 2024-08-09 | Stop reason: CLARIF

## 2024-08-08 RX ORDER — 0.9 % SODIUM CHLORIDE 0.9 %
30 INTRAVENOUS SOLUTION INTRAVENOUS ONCE
Status: COMPLETED | OUTPATIENT
Start: 2024-08-08 | End: 2024-08-08

## 2024-08-08 RX ORDER — CLOZAPINE 100 MG/1
200 TABLET ORAL NIGHTLY
Status: DISCONTINUED | OUTPATIENT
Start: 2024-08-08 | End: 2024-08-15 | Stop reason: HOSPADM

## 2024-08-08 RX ORDER — BISACODYL 10 MG
10 SUPPOSITORY, RECTAL RECTAL DAILY PRN
Status: DISCONTINUED | OUTPATIENT
Start: 2024-08-08 | End: 2024-08-15 | Stop reason: HOSPADM

## 2024-08-08 RX ADMIN — TAMSULOSIN HYDROCHLORIDE 0.4 MG: 0.4 CAPSULE ORAL at 18:14

## 2024-08-08 RX ADMIN — WATER 2000 MG: 1 INJECTION INTRAMUSCULAR; INTRAVENOUS; SUBCUTANEOUS at 12:00

## 2024-08-08 RX ADMIN — CLONAZEPAM 1.5 MG: 1 TABLET ORAL at 20:11

## 2024-08-08 RX ADMIN — DOXYCYCLINE 100 MG: 100 INJECTION, POWDER, LYOPHILIZED, FOR SOLUTION INTRAVENOUS at 12:05

## 2024-08-08 RX ADMIN — HEPARIN SODIUM 5000 UNITS: 5000 INJECTION INTRAVENOUS; SUBCUTANEOUS at 20:11

## 2024-08-08 RX ADMIN — SODIUM CHLORIDE 1000 ML: 9 INJECTION, SOLUTION INTRAVENOUS at 08:51

## 2024-08-08 RX ADMIN — BENZTROPINE MESYLATE 2 MG: 1 TABLET ORAL at 20:11

## 2024-08-08 RX ADMIN — PANTOPRAZOLE SODIUM 40 MG: 40 TABLET, DELAYED RELEASE ORAL at 18:14

## 2024-08-08 RX ADMIN — SODIUM CHLORIDE 1443 ML: 9 INJECTION, SOLUTION INTRAVENOUS at 08:51

## 2024-08-08 RX ADMIN — DEXTROSE MONOHYDRATE: 50 INJECTION, SOLUTION INTRAVENOUS at 18:14

## 2024-08-08 RX ADMIN — CLOZAPINE 200 MG: 100 TABLET ORAL at 20:11

## 2024-08-08 RX ADMIN — Medication 1000 ML: at 08:51

## 2024-08-08 RX ADMIN — DOXYCYCLINE 100 MG: 100 INJECTION, POWDER, LYOPHILIZED, FOR SOLUTION INTRAVENOUS at 23:26

## 2024-08-08 NOTE — H&P
Department of Internal Medicine        CHIEF COMPLAINT: Altered mental status at nursing home    Reason for Admission: Acute kidney injury, severe hypernatremia, probable UTI    HISTORY OF PRESENT ILLNESS:      The patient is a 69 y.o. male who presents with bead at the nursing home and his baseline is alert and oriented x 2 but nonverbal.  Patient had a change in mental status and was sent in for evaluation.  There was question of having a fever at the nursing home.  Patient temperature was 98.5 in the ED with a heart rate 103 and blood pressure 98/50.  O2 sat 97% on room air at rest.  Serum sodium is 168 with potassium 4.4 with chloride at 130 and CO2 27.  BUN/creatinine 86/3.0 with a lactic acid 2.0.  Random blood sugar 104.  Transaminase normal with a WBC 12.1 and hemoglobin 10.4 with .4.  Urinalysis showed  WBCs with +2 bacteria and large leukocyte esterase.    Past Medical History:    Past Medical History:   Diagnosis Date    Smoker 01/08/2024    4 cartons per month     Past Surgical History:    Past Surgical History:   Procedure Laterality Date    UPPER GASTROINTESTINAL ENDOSCOPY N/A 1/19/2024    EGD BIOPSY performed by Temo Barnett MD at [facility] ENDOSCOPY       Medications Prior to Admission:    @  Prior to Admission medications    Medication Sig Start Date End Date Taking? Authorizing Provider   vitamin C (ASCORBIC ACID) 500 MG tablet Take 1 tablet by mouth daily    Leonardo Vences MD   zinc 50 MG TABS tablet Take 1 tablet by mouth daily    Leonardo Vences MD   Cranberry 500 MG CAPS Take 1 capsule by mouth every morning    Leonardo Vences MD   droNABinol (MARINOL) 5 MG capsule Take 1 capsule by mouth 2 times daily. Max Daily Amount: 10 mg    Leonardo Vences MD   sulfamethoxazole-trimethoprim (BACTRIM DS;SEPTRA DS) 800-160 MG per tablet Take 1 tablet by mouth 2 times daily    Leonardo Vences MD   acetaminophen (TYLENOL) 325 MG tablet Take 2 tablets by mouth every  08/08/2024 08:36 AM    MCHC 28.3 08/08/2024 08:36 AM    RDW 16.4 08/08/2024 08:36 AM    LYMPHOPCT 11 08/08/2024 08:36 AM    MONOPCT 3 08/08/2024 08:36 AM    EOSPCT 0 08/08/2024 08:36 AM    BASOPCT 0 08/08/2024 08:36 AM    MONOSABS 0.41 08/08/2024 08:36 AM    LYMPHSABS 1.28 08/08/2024 08:36 AM    EOSABS 0.02 08/08/2024 08:36 AM    BASOSABS 0.03 08/08/2024 08:36 AM     CMP:    Lab Results   Component Value Date/Time     08/08/2024 08:36 AM    K 4.4 08/08/2024 08:36 AM     08/08/2024 08:36 AM    CO2 27 08/08/2024 08:36 AM    BUN 86 08/08/2024 08:36 AM    CREATININE 3.0 08/08/2024 08:36 AM    LABGLOM 22 08/08/2024 08:36 AM    LABGLOM >60 01/29/2024 05:44 AM    GLUCOSE 104 08/08/2024 08:36 AM    CALCIUM 8.8 08/08/2024 08:36 AM    BILITOT 0.2 08/08/2024 08:36 AM    ALKPHOS 138 08/08/2024 08:36 AM    AST 22 08/08/2024 08:36 AM    ALT 11 08/08/2024 08:36 AM     Magnesium:    Lab Results   Component Value Date/Time    MG 2.0 01/29/2024 05:44 AM     Phosphorus:    Lab Results   Component Value Date/Time    PHOS 1.6 01/29/2024 05:44 AM     PT/INR:    Lab Results   Component Value Date/Time    PROTIME 11.7 01/27/2024 07:05 AM    INR 1.0 01/27/2024 07:05 AM     Troponin:  No results found for: \"TROPONINI\"  U/A:    Lab Results   Component Value Date/Time    COLORU Yellow 08/08/2024 08:33 AM    PROTEINU 100 08/08/2024 08:33 AM    PHUR 8.5 08/08/2024 08:33 AM    PHUR 5.5 01/13/2024 06:00 PM    WBCUA 51  08/08/2024 08:33 AM    RBCUA 0 TO 2 08/08/2024 08:33 AM    BACTERIA 2+ 08/08/2024 08:33 AM    LEUKOCYTESUR LARGE 08/08/2024 08:33 AM    UROBILINOGEN 0.2 08/08/2024 08:33 AM    BILIRUBINUR NEGATIVE 08/08/2024 08:33 AM    GLUCOSEU NEGATIVE 08/08/2024 08:33 AM     ABG:    Lab Results   Component Value Date/Time    PH 7.484 01/08/2024 02:12 AM    PCO2 29.6 01/08/2024 02:12 AM    PO2 67.1 01/08/2024 02:12 AM    HCO3 21.7 01/08/2024 02:12 AM    BE -0.5 01/08/2024 02:12 AM    O2SAT 94.5 01/08/2024 02:12 AM     HgBA1c:

## 2024-08-08 NOTE — PROGRESS NOTES
Pharmacy Note    Aureliano Falcon was ordered Cranberry capsule.  As per the Critical access hospital Formulary Committee Policy, herbals and certain dietary supplements will be discontinued.  The herbal or dietary supplement may be continued after discharge from the hospital.  These changes were made per protocol according to the [facility] Scope of Practice Policy.      Please contact pharmacy with any questions regarding these changes.    Debbi Alcaraz, PharmD.  8/8/2024 4:04 PM    JESUS: [phone]

## 2024-08-08 NOTE — ED PROVIDER NOTES
Chief complaint:  Altered mental status    HPI history provided by report  Patient is here from a nursing home with reported baseline of oriented x 2 but nonverbal.  He apparently has a change in mental status noted today and was sent here for evaluation, they report a fever there but there is no documented fever here upon arrival.  Patient gives no information.    Review of Systems   Unable to perform ROS: Patient nonverbal        Physical Exam  Vitals and nursing note reviewed.   Constitutional:       General: He is awake. He is not in acute distress.     Appearance: He is well-developed. He is cachectic. He is not ill-appearing, toxic-appearing or diaphoretic.      Comments: Frail, cachectic, male appearing older than reported age   HENT:      Head: Normocephalic and atraumatic.      Comments: No sign of acute head or face injury     Mouth/Throat:      Comments: Dry lips, dry oral mucosa  Eyes:      General: No scleral icterus.     Extraocular Movements:      Right eye: No nystagmus.      Left eye: No nystagmus.      Pupils: Pupils are equal, round, and reactive to light.   Cardiovascular:      Rate and Rhythm: Regular rhythm. Tachycardia present.      Heart sounds: Normal heart sounds. No murmur heard.  Pulmonary:      Effort: Pulmonary effort is normal. No respiratory distress.      Breath sounds: Normal breath sounds. No stridor, decreased air movement or transmitted upper airway sounds. No decreased breath sounds, wheezing, rhonchi or rales.   Chest:      Chest wall: No tenderness.   Abdominal:      General: Bowel sounds are normal. There is no distension.      Palpations: Abdomen is soft.      Tenderness: There is no abdominal tenderness. There is no right CVA tenderness, left CVA tenderness, guarding or rebound.      Comments: Abdomen soft, flat, no gross distention or jaundice or icterus.  No apparent tenderness on palpation   Musculoskeletal:         General: No deformity or signs of injury.      Cervical  Procalcitonin 1.30 (H) 0.00 - 0.08 ng/mL   Platelet Confirmation   Result Value Ref Range    Platelet Confirmation CONFIRMED    CBC with Auto Differential   Result Value Ref Range    WBC 8.6 4.5 - 11.5 k/uL    RBC 2.56 (L) 3.80 - 5.80 m/uL    Hemoglobin 7.4 (L) 12.5 - 16.5 g/dL    Hematocrit 24.9 (L) 37.0 - 54.0 %    MCV 97.3 80.0 - 99.9 fL    MCH 28.9 26.0 - 35.0 pg    MCHC 29.7 (L) 32.0 - 34.5 g/dL    RDW 16.1 (H) 11.5 - 15.0 %    MPV 13.8 (H) 7.0 - 12.0 fL    Platelet, Fluorescence 116 (L) 130 - 450 k/uL    Neutrophils % 76 43.0 - 80.0 %    Lymphocytes % 18 (L) 20.0 - 42.0 %    Monocytes % 4 2.0 - 12.0 %    Eosinophils % 2 0 - 6 %    Basophils % 0 0.0 - 2.0 %    Neutrophils Absolute 6.56 1.80 - 7.30 k/uL    Lymphocytes Absolute 1.58 1.50 - 4.00 k/uL    Monocytes Absolute 0.30 0.10 - 0.95 k/uL    Eosinophils Absolute 0.15 0.05 - 0.50 k/uL    Basophils Absolute 0.00 0.00 - 0.20 k/uL    RBC Morphology 1+ ANISOCYTOSIS     RBC Morphology 1+ OVALOCYTES     RBC Morphology 1+ POIKILOCYTOSIS     RBC Morphology 1+ POLYCHROMASIA    Comprehensive Metabolic Panel   Result Value Ref Range    Sodium 153 (H) 132 - 146 mmol/L    Potassium 3.5 3.5 - 5.0 mmol/L    Chloride 123 (H) 98 - 107 mmol/L    CO2 23 22 - 29 mmol/L    Anion Gap 7 7 - 16 mmol/L    Glucose 107 (H) 74 - 99 mg/dL    BUN 59 (H) 6 - 23 mg/dL    Creatinine 2.7 (H) 0.70 - 1.20 mg/dL    Est, Glom Filt Rate 25 (L) >60 mL/min/1.73m2    Calcium 8.0 (L) 8.6 - 10.2 mg/dL    Total Protein 5.7 (L) 6.4 - 8.3 g/dL    Albumin 2.1 (L) 3.5 - 5.2 g/dL    Total Bilirubin 0.2 0.0 - 1.2 mg/dL    Alkaline Phosphatase 136 (H) 40 - 129 U/L    ALT 10 0 - 40 U/L    AST 20 0 - 39 U/L   Protime-INR   Result Value Ref Range    Protime 13.9 (H) 9.3 - 12.4 sec    INR 1.3    Hemoglobin and Hematocrit   Result Value Ref Range    Hemoglobin 8.3 (L) 12.5 - 16.5 g/dL    Hematocrit 27.7 (L) 37.0 - 54.0 %   EKG 12 Lead   Result Value Ref Range    Ventricular Rate 113 BPM    Atrial Rate 113 BPM    P-R

## 2024-08-08 NOTE — PROGRESS NOTES
4 Eyes Skin Assessment     NAME:  Aureliano Falcon  YOB: 1955  MEDICAL RECORD NUMBER:  [id number]    The patient is being assessed for  Admission    I agree that at least one RN has performed a thorough Head to Toe Skin Assessment on the patient. ALL assessment sites listed below have been assessed.      Areas assessed by both nurses:    Head, Face, Ears, Shoulders, Back, Chest, Arms, Elbows, Hands, Sacrum. Buttock, Coccyx, Ischium, Legs. Feet and Heels, and Under Medical Devices         Does the Patient have a Wound? Yes wound(s) were present on assessment. LDA wound assessment was Initiated and completed by RN       Isidro Prevention initiated by RN: Yes  Wound Care Orders initiated by RN: Yes    Pressure Injury (Stage 3,4, Unstageable, DTI, NWPT, and Complex wounds) if present, place Wound referral order by RN under : Yes    New Ostomies, if present place, Ostomy referral order under : No     Nurse 1 eSignature: Electronically signed by Richard Blair RN on 8/8/24 at 7:02 PM EDT    **SHARE this note so that the co-signing nurse can place an eSignature**    Nurse 2 eSignature: Electronically signed by Rae Escobar RN on 8/8/24 at 7:02 PM EDT

## 2024-08-09 LAB
ALBUMIN SERPL-MCNC: 2.7 G/DL (ref 3.5–5.2)
ALP SERPL-CCNC: 143 U/L (ref 40–129)
ALT SERPL-CCNC: 10 U/L (ref 0–40)
ANION GAP SERPL CALCULATED.3IONS-SCNC: 9 MMOL/L (ref 7–16)
AST SERPL-CCNC: 22 U/L (ref 0–39)
BASOPHILS # BLD: 0 K/UL (ref 0–0.2)
BASOPHILS NFR BLD: 0 % (ref 0–2)
BILIRUB SERPL-MCNC: 0.2 MG/DL (ref 0–1.2)
BUN SERPL-MCNC: 63 MG/DL (ref 6–23)
CALCIUM SERPL-MCNC: 8.5 MG/DL (ref 8.6–10.2)
CHLORIDE SERPL-SCNC: 126 MMOL/L (ref 98–107)
CHOLEST SERPL-MCNC: 66 MG/DL
CO2 SERPL-SCNC: 25 MMOL/L (ref 22–29)
CREAT SERPL-MCNC: 2.8 MG/DL (ref 0.7–1.2)
EOSINOPHIL # BLD: 0 K/UL (ref 0.05–0.5)
EOSINOPHILS RELATIVE PERCENT: 0 % (ref 0–6)
ERYTHROCYTE [DISTWIDTH] IN BLOOD BY AUTOMATED COUNT: 16.6 % (ref 11.5–15)
FOLATE SERPL-MCNC: >20 NG/ML (ref 4.8–24.2)
GFR, ESTIMATED: 24 ML/MIN/1.73M2
GLUCOSE SERPL-MCNC: 96 MG/DL (ref 74–99)
HCT VFR BLD AUTO: 28.8 % (ref 37–54)
HDLC SERPL-MCNC: 31 MG/DL
HGB BLD-MCNC: 8.5 G/DL (ref 12.5–16.5)
LDLC SERPL CALC-MCNC: 21 MG/DL
LYMPHOCYTES NFR BLD: 2.01 K/UL (ref 1.5–4)
LYMPHOCYTES RELATIVE PERCENT: 18 % (ref 20–42)
MAGNESIUM SERPL-MCNC: 2.4 MG/DL (ref 1.6–2.6)
MCH RBC QN AUTO: 28.7 PG (ref 26–35)
MCHC RBC AUTO-ENTMCNC: 29.5 G/DL (ref 32–34.5)
MCV RBC AUTO: 97.3 FL (ref 80–99.9)
MONOCYTES NFR BLD: 0.19 K/UL (ref 0.1–0.95)
MONOCYTES NFR BLD: 2 % (ref 2–12)
NEUTROPHILS NFR BLD: 80 % (ref 43–80)
NEUTS SEG NFR BLD: 8.8 K/UL (ref 1.8–7.3)
PHOSPHATE SERPL-MCNC: 2.4 MG/DL (ref 2.5–4.5)
PLATELET CONFIRMATION: NORMAL
PLATELET, FLUORESCENCE: 122 K/UL (ref 130–450)
PMV BLD AUTO: 13.4 FL (ref 7–12)
POTASSIUM SERPL-SCNC: 3.6 MMOL/L (ref 3.5–5)
PROCALCITONIN SERPL-MCNC: 1.3 NG/ML (ref 0–0.08)
PROT SERPL-MCNC: 6.6 G/DL (ref 6.4–8.3)
RBC # BLD AUTO: 2.96 M/UL (ref 3.8–5.8)
RBC # BLD: ABNORMAL 10*6/UL
SODIUM SERPL-SCNC: 160 MMOL/L (ref 132–146)
TRIGL SERPL-MCNC: 71 MG/DL
TSH SERPL DL<=0.05 MIU/L-ACNC: 4.89 UIU/ML (ref 0.27–4.2)
VIT B12 SERPL-MCNC: 700 PG/ML (ref 211–946)
VLDLC SERPL CALC-MCNC: 14 MG/DL
WBC OTHER # BLD: 11 K/UL (ref 4.5–11.5)

## 2024-08-09 PROCEDURE — 6360000002 HC RX W HCPCS: Performed by: INTERNAL MEDICINE

## 2024-08-09 PROCEDURE — 84443 ASSAY THYROID STIM HORMONE: CPT

## 2024-08-09 PROCEDURE — 84100 ASSAY OF PHOSPHORUS: CPT

## 2024-08-09 PROCEDURE — 86901 BLOOD TYPING SEROLOGIC RH(D): CPT

## 2024-08-09 PROCEDURE — 85018 HEMOGLOBIN: CPT

## 2024-08-09 PROCEDURE — 2580000003 HC RX 258: Performed by: INTERNAL MEDICINE

## 2024-08-09 PROCEDURE — 85025 COMPLETE CBC W/AUTO DIFF WBC: CPT

## 2024-08-09 PROCEDURE — 85610 PROTHROMBIN TIME: CPT

## 2024-08-09 PROCEDURE — 80061 LIPID PANEL: CPT

## 2024-08-09 PROCEDURE — 1200000000 HC SEMI PRIVATE

## 2024-08-09 PROCEDURE — 84145 PROCALCITONIN (PCT): CPT

## 2024-08-09 PROCEDURE — 97530 THERAPEUTIC ACTIVITIES: CPT | Performed by: PHYSICAL THERAPIST

## 2024-08-09 PROCEDURE — 86900 BLOOD TYPING SEROLOGIC ABO: CPT

## 2024-08-09 PROCEDURE — 97161 PT EVAL LOW COMPLEX 20 MIN: CPT | Performed by: PHYSICAL THERAPIST

## 2024-08-09 PROCEDURE — 6370000000 HC RX 637 (ALT 250 FOR IP): Performed by: INTERNAL MEDICINE

## 2024-08-09 PROCEDURE — 86850 RBC ANTIBODY SCREEN: CPT

## 2024-08-09 PROCEDURE — 80053 COMPREHEN METABOLIC PANEL: CPT

## 2024-08-09 PROCEDURE — 2500000003 HC RX 250 WO HCPCS: Performed by: INTERNAL MEDICINE

## 2024-08-09 PROCEDURE — 36415 COLL VENOUS BLD VENIPUNCTURE: CPT

## 2024-08-09 PROCEDURE — 85014 HEMATOCRIT: CPT

## 2024-08-09 PROCEDURE — 83735 ASSAY OF MAGNESIUM: CPT

## 2024-08-09 RX ORDER — SODIUM CHLORIDE, SODIUM LACTATE, POTASSIUM CHLORIDE, AND CALCIUM CHLORIDE .6; .31; .03; .02 G/100ML; G/100ML; G/100ML; G/100ML
1000 INJECTION, SOLUTION INTRAVENOUS ONCE
Status: COMPLETED | OUTPATIENT
Start: 2024-08-09 | End: 2024-08-10

## 2024-08-09 RX ORDER — METOCLOPRAMIDE 5 MG/1
5 TABLET ORAL 4 TIMES DAILY PRN
Status: DISCONTINUED | OUTPATIENT
Start: 2024-08-09 | End: 2024-08-09 | Stop reason: ALTCHOICE

## 2024-08-09 RX ORDER — PANTOPRAZOLE SODIUM 40 MG/10ML
40 INJECTION, POWDER, LYOPHILIZED, FOR SOLUTION INTRAVENOUS ONCE
Status: COMPLETED | OUTPATIENT
Start: 2024-08-09 | End: 2024-08-09

## 2024-08-09 RX ORDER — LANSOPRAZOLE 30 MG/1
30 TABLET, ORALLY DISINTEGRATING, DELAYED RELEASE ORAL
Status: DISCONTINUED | OUTPATIENT
Start: 2024-08-09 | End: 2024-08-10

## 2024-08-09 RX ADMIN — CLONAZEPAM 0.5 MG: 0.5 TABLET ORAL at 10:47

## 2024-08-09 RX ADMIN — TAMSULOSIN HYDROCHLORIDE 0.4 MG: 0.4 CAPSULE ORAL at 10:47

## 2024-08-09 RX ADMIN — WATER 1000 MG: 1 INJECTION INTRAMUSCULAR; INTRAVENOUS; SUBCUTANEOUS at 10:19

## 2024-08-09 RX ADMIN — DEXTROSE MONOHYDRATE: 50 INJECTION, SOLUTION INTRAVENOUS at 17:05

## 2024-08-09 RX ADMIN — HEPARIN SODIUM 5000 UNITS: 5000 INJECTION INTRAVENOUS; SUBCUTANEOUS at 10:19

## 2024-08-09 RX ADMIN — BENZTROPINE MESYLATE 2 MG: 1 TABLET ORAL at 20:22

## 2024-08-09 RX ADMIN — LANSOPRAZOLE 30 MG: 30 TABLET, ORALLY DISINTEGRATING, DELAYED RELEASE ORAL at 05:20

## 2024-08-09 RX ADMIN — BENZTROPINE MESYLATE 2 MG: 1 TABLET ORAL at 10:48

## 2024-08-09 RX ADMIN — CLOZAPINE 200 MG: 100 TABLET ORAL at 20:22

## 2024-08-09 RX ADMIN — LANSOPRAZOLE 30 MG: 30 TABLET, ORALLY DISINTEGRATING, DELAYED RELEASE ORAL at 18:49

## 2024-08-09 RX ADMIN — HEPARIN SODIUM 5000 UNITS: 5000 INJECTION INTRAVENOUS; SUBCUTANEOUS at 20:22

## 2024-08-09 RX ADMIN — CLONAZEPAM 1.5 MG: 1 TABLET ORAL at 20:22

## 2024-08-09 RX ADMIN — DOXYCYCLINE 100 MG: 100 INJECTION, POWDER, LYOPHILIZED, FOR SOLUTION INTRAVENOUS at 23:13

## 2024-08-09 RX ADMIN — SODIUM CHLORIDE, POTASSIUM CHLORIDE, SODIUM LACTATE AND CALCIUM CHLORIDE 1000 ML: 600; 310; 30; 20 INJECTION, SOLUTION INTRAVENOUS at 23:40

## 2024-08-09 RX ADMIN — PANTOPRAZOLE SODIUM 40 MG: 40 INJECTION, POWDER, FOR SOLUTION INTRAVENOUS at 23:35

## 2024-08-09 RX ADMIN — DOXYCYCLINE 100 MG: 100 INJECTION, POWDER, LYOPHILIZED, FOR SOLUTION INTRAVENOUS at 13:50

## 2024-08-09 RX ADMIN — MAGNESIUM HYDROXIDE 30 ML: 400 SUSPENSION ORAL at 10:48

## 2024-08-09 RX ADMIN — CLOZAPINE 100 MG: 100 TABLET ORAL at 10:47

## 2024-08-09 ASSESSMENT — PAIN SCALES - GENERAL: PAINLEVEL_OUTOF10: 0

## 2024-08-09 NOTE — PROGRESS NOTES
Physical Therapy Initial Evaluation/Plan of Care    Room #:  0536/0536-02  Patient Name: Aureliano Falcon  YOB: 1955  MRN: [id number]    Date of Service: 8/9/2024     Tentative placement recommendation: Skilled Nursing Facility   Equipment recommendation: Equipment at Nursing Home      Evaluating Physical Therapist: Russ Alejo PT Lic.  #[id number]      Specific Provider Orders/Date/Referring Provider :  08/08/24 1600    PT eval and treat  Start:  08/08/24 1600,   End:  08/08/24 1600,   ONE TIME,   Standing Count:  1 Occurrences,   R       Michael Gallo DO    Admitting Diagnosis:   Dehydration [E86.0]  Hypernatremia [E87.0]  Septicemia (HCC) [A41.9]  Acute cystitis without hematuria [N30.00]  Acute renal failure, unspecified acute renal failure type (HCC) [N17.9]  Altered mental status, unspecified altered mental status type [R41.82]  Pneumonia due to infectious organism, unspecified laterality, unspecified part of lung [J18.9]       Surgery: none  Visit Diagnoses         Codes    Dehydration     E86.0    Acute renal failure, unspecified acute renal failure type (HCC)     N17.9    Acute cystitis without hematuria     N30.00    Pneumonia due to infectious organism, unspecified laterality, unspecified part of lung     J18.9    Septicemia (HCC)     A41.9            Patient Active Problem List   Diagnosis    Altered mental status    Hypernatremia        ASSESSMENT of Current Deficits Patient exhibits decreased strength, balance, and endurance impairing functional mobility, transfers, tolerance to activity, and participation .  These are barriers to d/c and require skilled intervention to address concerns listed above to increase safety and independence at discharge.   Decreased strength, balance and endurance  increases patient's risk for fall.  Severe deconditioning impacting tolerance to activity and ability to participate in rehab and reach maximum level of independence.   Strengthening and endurance  arms?: Total  How much help is needed walking in hospital room?: Total  How much help is needed climbing 3-5 steps with a railing?: Total  AM-PAC Inpatient Mobility Raw Score : 6  AM-PAC Inpatient T-Scale Score : 23.55  Mobility Inpatient CMS 0-100% Score: 100  Mobility Inpatient CMS G-Code Modifier : CN    Nursing cleared patient for PT evaluation. The admitting diagnosis and active problem list as listed above have been reviewed prior to the initiation of this evaluation.    OBJECTIVE:   Initial Evaluation  Date: 8/9/2024 Treatment Date:     Short Term/ Long Term   Goals   Was pt agreeable to Eval/treatment? Yes  To be met in 2 days   Pain level   ?/10   Non verbal     Bed Mobility  Using rails and head of bed elevated:     Rolling: Dependent of  2    Supine to sit: Dependent of  2    Sit to supine: Dependent of  2    Scooting: Dependent of  2    Rolling: Maximal assist of 1    Supine to sit: Maximal assist of 1    Sit to supine: Maximal assist of 1    Scooting: Maximal assist of 1     Transfers Sit to stand: Not assessed due to pt overall debility, decreased activity tolerance, balance deficits, safety and fall risk.       Sit to stand: Not assessed due to pt overall debility, decreased activity tolerance, balance deficits, safety and fall risk.        Ambulation    not assessed      not assessed    Stair negotiation: ascended and descended   Not assessed     Not assessed    ROM Within functional limits    Increase range of motion 10% of affected joints    Strength BUE:  refer to OT eval  RLE:  2/5  LLE:  2/5  Increase strength in affected mm groups by 1/3 grade   Balance Sitting EOB:  poor    Dynamic Standing:   NT  Sitting EOB:  fair    Dynamic Standing: not assessed       Patient is Alert & Oriented x none and does not follow directions    Sensation:  Patient  not appropriate to assess    Edema:  no   Endurance: poor      Vitals: room air   Blood Pressure at rest  Blood Pressure during session    Heart Rate at

## 2024-08-09 NOTE — PROGRESS NOTES
Pharmacy Note - Renal dose adjustment made per P/T protocol    Original order:  Metoclopramide 10mg PO QID PRN nausea, before meals and at bedtime.    Estimated Creatinine Clearance: 16 mL/min (A) (based on SCr of 3 mg/dL (H)).    Recent Labs     08/08/24  0836   BUN 86*   CREATININE 3.0*           Renally adjusted order:  Metoclopramide 5 mg PO QID PRN nausea, before meals and at bedtime.    Please call pharmacy with any questions.    Thank you,  Kayli Marie [facility]  8/9/2024 11:30 AM

## 2024-08-09 NOTE — ACP (ADVANCE CARE PLANNING)
Advance Care Planning   Healthcare Decision Maker:    Primary Decision Maker: Lashanda Falcon - Legal Guardian, Legal Guardian - [phone]    Secondary Decision Maker: Tobi Falcon - Brother/Sister - [phone]      Today we documented Decision Maker(s) consistent with Legal Next of Kin hierarchy.

## 2024-08-09 NOTE — CARE COORDINATION
Case Management Assessment  Initial Evaluation    Date/Time of Evaluation: 8/9/2024 4:40 PM  Assessment Completed by: KVNG Heck    If patient is discharged prior to next notation, then this note serves as note for discharge by case management.    Patient Name: Aureliano Falcon                   YOB: 1955  Diagnosis: Dehydration [E86.0]  Hypernatremia [E87.0]  Septicemia (HCC) [A41.9]  Acute cystitis without hematuria [N30.00]  Acute renal failure, unspecified acute renal failure type (HCC) [N17.9]  Altered mental status, unspecified altered mental status type [R41.82]  Pneumonia due to infectious organism, unspecified laterality, unspecified part of lung [J18.9]                   Date / Time: 8/8/2024  7:43 AM    Patient Admission Status: Inpatient   Readmission Risk (Low < 19, Mod (19-27), High > 27): Readmission Risk Score: 17.2    Current PCP: Toshia Perea APRN - CNP  PCP verified by CM? Yes    Chart Reviewed: Yes      History Provided by: Child/Family (Mom (LG))  Patient Orientation: Unable to Assess    Patient Cognition:  (Pt is Non Verbal)    Hospitalization in the last 30 days (Readmission):  No    If yes, Readmission Assessment in CM Navigator will be completed.    Advance Directives:      Code Status: Prior   Patient's Primary Decision Maker is: Legal Next of Kin    Primary Decision Maker: EzekielLashanda - Legal Guardian, Legal Guardian - [phone]    Secondary Decision Maker: Tobi Falcon - Brother/Sister - [phone]    Discharge Planning:    Patient lives with:   Type of Home:    Primary Care Giver:    Patient Support Systems include: Children   Current Financial resources:    Current community resources:    Current services prior to admission:              Current DME:              Type of Home Care services:       ADLS  Prior functional level: Assistance with the following:  Current functional level: Assistance with the following:    PT AM-PAC: 6 /24  OT AM-PAC:

## 2024-08-09 NOTE — PROGRESS NOTES
Speech Language Pathology  NAME:  Aureliano Falcon  :  1955  DATE: 2024  ROOM:  36/0536-02     SLP eval and treat  Start:  24,   End:  24,   ONE TIME,   Standing Count:  1 Occurrences,   R       Michael Gallo, DO    At nursing home peg tube with pleasure feeds when requested of puree and nectar thick     Pt unavailable today  for Clinical Swallow Evaluation Discussed with patient nurse in person    REASON:  HOLD per RN, Pt strict NPO at this time     Will re-attempt as appropriate.       Thank You    Becca Le MSCCC/SLP  Speech Language Pathologist  [phone]

## 2024-08-09 NOTE — CONSULTS
Comprehensive Nutrition Assessment    Type and Reason for Visit:  Initial, Consult (TFOM)    Nutrition Recommendations/Plan:   Continue NPO  Tube Feeding Recommendations:      Standard with Fiber (Jevity 1.5 daniel) @ 10mls increasing 15 mls q6 hours as tolerated to goal of 50mls/hr x 24hrs, additional 30 mls q4 hrs fwf  to provide: 1750 kcals , 75 grams of protein, 1,092 mls free water, providing 100% of calorie and protein needs per day. Reg via PEG tube BID for sacral wound.    RD Note: Pt was on oral intake and Tube Feeds at nursing home, however pt had a -24% weight loss in six months suggesting his po intake is minimal to none. This RD recommends pt is strictly NPO and receive all nutrition through tube feed. Recommended Tube Feed above has been calculated for slow weight gain and meets 100% of calorie, and protein needs per day. Will adjust H20 requirements for nursing home upon discharge.    3. Consult RD as needed     Malnutrition Assessment:  Malnutrition Status:  Severe malnutrition (08/09/24 1116)    Context:  Chronic Illness     Findings of the 6 clinical characteristics of malnutrition:  Energy Intake:  Unable to assess (Nonverbal)  Weight Loss:  Greater than 10% over 6 months (-24% in six months)     Body Fat Loss:  Severe body fat loss Orbital, Triceps   Muscle Mass Loss:  Severe muscle mass loss Temples (temporalis), Clavicles (pectoralis & deltoids)  Fluid Accumulation:  No significant fluid accumulation Extremities   Strength:  Not Performed    Nutrition Assessment:    Pt admit for hypernatremia, AMS and BREN 2/2 UTI, RLL PNA. Pt is nonverbal at baseline. PMHx: smoker, hepatic steatosis, Dysphagia, Gastric Ulcers, Gastroesophageal Junction Polyp, Esophagitis. Pt is NPO. Per Nursing pt is on Tube Feeds and oral intake at nursing home. This RD believes PO intake is minimal or O% as pt had -24% weight loss in six months, recommend strictly tube feeds in order to meet daily calorie, fluid and  protein goals per day. Will calculate tube feeds for slow weight gain, f/up per policy.    Nutrition Related Findings:    A/O x1, I/O -850 since admit, abd wdl, bowel sounds active x4, Na+ 168, Chloride 130, elevated BUN/Creatinine, GFR 22, Lactic Acid 2.1, Serum Osmolality 374, Troponin 73, ALK PHOS 138, WBC 12.1, RBC 3.55, Hgb 10.4, HCt 36.7, Fe+ 28, TIBC 176 Wound Type: Open Wounds (Sacral)       Current Nutrition Intake & Therapies:    Average Meal Intake: NPO  Average Supplements Intake: NPO  ADULT TUBE FEEDING; PEG; Standard without Fiber; Continuous; 45; No; 100; Q 4 hours  Current Tube Feeding (TF) Orders:  Feeding Route: PEG/J  Formula: Standard with Fiber  Schedule: Continuous  Feeding Regimen: Jevity 1.5 (Standard w/ Fiber) @ 50mls/hour x 24 hours  Additives/Modulars: None  Water Flushes: 30mls q4 hours  Current TF & Flush Orders Provides:    Goal TF & Flush Orders Provides: 1200mls TV, 1750 kcals, 75 grams of protein and 1,092mls H20    Anthropometric Measures:  Height: 162.6 cm (5' 4.02\")  Ideal Body Weight (IBW): 130 lbs (59 kg)    Admission Body Weight: 48.1 kg (106 lb 0.7 oz)  Current Body Weight: 48.1 kg (106 lb 0.7 oz), 81.6 % IBW. Weight Source:  (08/08/24)  Current BMI (kg/m2): 18.2  Usual Body Weight: 63.2 kg (139 lb 5.3 oz) (01/28/24 bed scale)  % Weight Change (Calculated): -23.9  Weight Adjustment For: No Adjustment     BMI Categories: Underweight (BMI less than 22) age over 65    Estimated Daily Nutrient Needs:  Energy Requirements Based On: Kcal/kg  Weight Used for Energy Requirements: Current  Energy (kcal/day): 1056-9938 kcals/day (CBW 30-35 kcals/kg +250-500 for weight gain, Underweight BMI <22 age 65+)  Weight Used for Protein Requirements: Current  Protein (g/day): 70 -95 g/day (CBW 1.5-2 g/kg Underweight BMI <22 age 65+)  Method Used for Fluid Requirements: 1 ml/kcal  Fluid (ml/day): 5441-3981 mls/day or per care team    Nutrition Diagnosis:   Severe malnutrition, In context of

## 2024-08-09 NOTE — PROGRESS NOTES
Department of Internal Medicine        CHIEF COMPLAINT: Altered mental status at nursing home    Reason for Admission: Acute kidney injury, severe hypernatremia, probable UTI    HISTORY OF PRESENT ILLNESS:      The patient is a 69 y.o. male who presents with bead at the nursing home and his baseline is alert and oriented x 2 but nonverbal.  Patient had a change in mental status and was sent in for evaluation.  There was question of having a fever at the nursing home.  Patient temperature was 98.5 in the ED with a heart rate 103 and blood pressure 98/50.  O2 sat 97% on room air at rest.  Serum sodium is 168 with potassium 4.4 with chloride at 130 and CO2 27.  BUN/creatinine 86/3.0 with a lactic acid 2.0.  Random blood sugar 104.  Transaminase normal with a WBC 12.1 and hemoglobin 10.4 with .4.  Urinalysis showed  WBCs with +2 bacteria and large leukocyte esterase.    8/9/2024  Patient seen and examined on PCU.  Patient is about the same.  Case discussed with patient's nurse.  No lab work back yet.  Temperature is 98 with heart rate of 96 and blood pressure 100/50.  O2 sat 98% room air at rest.  Patient to have his tube feedings restarted.    Past Medical History:    Past Medical History:   Diagnosis Date    Smoker 01/08/2024    4 cartons per month     Past Surgical History:    Past Surgical History:   Procedure Laterality Date    UPPER GASTROINTESTINAL ENDOSCOPY N/A 1/19/2024    EGD BIOPSY performed by Temo Barnett MD at [facility] ENDOSCOPY       Medications Prior to Admission:    @  Prior to Admission medications    Medication Sig Start Date End Date Taking? Authorizing Provider   Cranberry 500 MG CAPS Take 1 capsule by mouth every morning    ProviderLeonardo MD   acetaminophen (TYLENOL) 325 MG tablet Take 2 tablets by mouth every 4 hours as needed for Pain or Fever    Leonardo Vences MD   magnesium hydroxide (MILK OF MAGNESIA) 400 MG/5ML suspension Take 30 mLs by mouth daily as needed for

## 2024-08-09 NOTE — PROGRESS NOTES
OT SESSION ATTEMPT     Date:2024  Patient Name: Aureliano Falcon  MRN: [id number]  : 1955  Room: [address] [place], [place] [id number]-     Attempted OT session this date:    [x] unavailable due to other medical staff currently with pt-difficulty getting blood drawn, then with nursing for intervention and then family came in.   [] unavailable per nursing staff recommendation    [] Unavailable per nursing staff secondary to lab / radiology results    [] pt declined due to ____.  Benefits of participation in therapy reviewed with pt.    [] off unit   [] Other:     Will reattempt OT evaluation and/or treatment at a later time.    Kya Rivera OTR/L; [id number]

## 2024-08-10 LAB
ABO + RH BLD: NORMAL
ALBUMIN SERPL-MCNC: 2.1 G/DL (ref 3.5–5.2)
ALP SERPL-CCNC: 136 U/L (ref 40–129)
ALT SERPL-CCNC: 10 U/L (ref 0–40)
ANION GAP SERPL CALCULATED.3IONS-SCNC: 7 MMOL/L (ref 7–16)
ARM BAND NUMBER: NORMAL
AST SERPL-CCNC: 20 U/L (ref 0–39)
BASOPHILS # BLD: 0 K/UL (ref 0–0.2)
BASOPHILS NFR BLD: 0 % (ref 0–2)
BILIRUB SERPL-MCNC: 0.2 MG/DL (ref 0–1.2)
BLOOD BANK SAMPLE EXPIRATION: NORMAL
BLOOD GROUP ANTIBODIES SERPL: NEGATIVE
BUN SERPL-MCNC: 59 MG/DL (ref 6–23)
CALCIUM SERPL-MCNC: 8 MG/DL (ref 8.6–10.2)
CHLORIDE SERPL-SCNC: 123 MMOL/L (ref 98–107)
CO2 SERPL-SCNC: 23 MMOL/L (ref 22–29)
CREAT SERPL-MCNC: 2.7 MG/DL (ref 0.7–1.2)
EOSINOPHIL # BLD: 0.15 K/UL (ref 0.05–0.5)
EOSINOPHILS RELATIVE PERCENT: 2 % (ref 0–6)
ERYTHROCYTE [DISTWIDTH] IN BLOOD BY AUTOMATED COUNT: 16.1 % (ref 11.5–15)
GFR, ESTIMATED: 25 ML/MIN/1.73M2
GLUCOSE SERPL-MCNC: 107 MG/DL (ref 74–99)
HCT VFR BLD AUTO: 24.9 % (ref 37–54)
HCT VFR BLD AUTO: 27.7 % (ref 37–54)
HGB BLD-MCNC: 7.4 G/DL (ref 12.5–16.5)
HGB BLD-MCNC: 8.3 G/DL (ref 12.5–16.5)
INR PPP: 1.3
LYMPHOCYTES NFR BLD: 1.58 K/UL (ref 1.5–4)
LYMPHOCYTES RELATIVE PERCENT: 18 % (ref 20–42)
MCH RBC QN AUTO: 28.9 PG (ref 26–35)
MCHC RBC AUTO-ENTMCNC: 29.7 G/DL (ref 32–34.5)
MCV RBC AUTO: 97.3 FL (ref 80–99.9)
MONOCYTES NFR BLD: 0.3 K/UL (ref 0.1–0.95)
MONOCYTES NFR BLD: 4 % (ref 2–12)
NEUTROPHILS NFR BLD: 76 % (ref 43–80)
NEUTS SEG NFR BLD: 6.56 K/UL (ref 1.8–7.3)
PLATELET, FLUORESCENCE: 116 K/UL (ref 130–450)
PMV BLD AUTO: 13.8 FL (ref 7–12)
POTASSIUM SERPL-SCNC: 3.5 MMOL/L (ref 3.5–5)
PROT SERPL-MCNC: 5.7 G/DL (ref 6.4–8.3)
PROTHROMBIN TIME: 13.9 SEC (ref 9.3–12.4)
RBC # BLD AUTO: 2.56 M/UL (ref 3.8–5.8)
RBC # BLD: ABNORMAL 10*6/UL
SODIUM SERPL-SCNC: 153 MMOL/L (ref 132–146)
WBC OTHER # BLD: 8.6 K/UL (ref 4.5–11.5)

## 2024-08-10 PROCEDURE — 36415 COLL VENOUS BLD VENIPUNCTURE: CPT

## 2024-08-10 PROCEDURE — 87077 CULTURE AEROBIC IDENTIFY: CPT

## 2024-08-10 PROCEDURE — 85025 COMPLETE CBC W/AUTO DIFF WBC: CPT

## 2024-08-10 PROCEDURE — 2580000003 HC RX 258: Performed by: INTERNAL MEDICINE

## 2024-08-10 PROCEDURE — 6360000002 HC RX W HCPCS: Performed by: INTERNAL MEDICINE

## 2024-08-10 PROCEDURE — 87040 BLOOD CULTURE FOR BACTERIA: CPT

## 2024-08-10 PROCEDURE — 87086 URINE CULTURE/COLONY COUNT: CPT

## 2024-08-10 PROCEDURE — 80053 COMPREHEN METABOLIC PANEL: CPT

## 2024-08-10 PROCEDURE — 1200000000 HC SEMI PRIVATE

## 2024-08-10 PROCEDURE — 6370000000 HC RX 637 (ALT 250 FOR IP): Performed by: INTERNAL MEDICINE

## 2024-08-10 PROCEDURE — 2500000003 HC RX 250 WO HCPCS: Performed by: INTERNAL MEDICINE

## 2024-08-10 RX ORDER — DEXTROSE, SODIUM CHLORIDE, AND POTASSIUM CHLORIDE 5; .2; .15 G/100ML; G/100ML; G/100ML
INJECTION INTRAVENOUS CONTINUOUS
Status: DISCONTINUED | OUTPATIENT
Start: 2024-08-10 | End: 2024-08-10 | Stop reason: SDUPTHER

## 2024-08-10 RX ORDER — 0.9 % SODIUM CHLORIDE 0.9 %
500 INTRAVENOUS SOLUTION INTRAVENOUS ONCE
Status: COMPLETED | OUTPATIENT
Start: 2024-08-10 | End: 2024-08-10

## 2024-08-10 RX ADMIN — DOXYCYCLINE 100 MG: 100 INJECTION, POWDER, LYOPHILIZED, FOR SOLUTION INTRAVENOUS at 23:20

## 2024-08-10 RX ADMIN — DOXYCYCLINE 100 MG: 100 INJECTION, POWDER, LYOPHILIZED, FOR SOLUTION INTRAVENOUS at 11:11

## 2024-08-10 RX ADMIN — CEFEPIME 1000 MG: 1 INJECTION, POWDER, FOR SOLUTION INTRAMUSCULAR; INTRAVENOUS at 18:55

## 2024-08-10 RX ADMIN — WATER 1000 MG: 1 INJECTION INTRAMUSCULAR; INTRAVENOUS; SUBCUTANEOUS at 11:10

## 2024-08-10 RX ADMIN — SODIUM CHLORIDE 500 ML: 9 INJECTION, SOLUTION INTRAVENOUS at 02:40

## 2024-08-10 RX ADMIN — DEXTROSE MONOHYDRATE: 50 INJECTION, SOLUTION INTRAVENOUS at 05:04

## 2024-08-10 RX ADMIN — POTASSIUM CHLORIDE: 2 INJECTION, SOLUTION, CONCENTRATE INTRAVENOUS at 15:28

## 2024-08-10 RX ADMIN — CLOZAPINE 100 MG: 100 TABLET ORAL at 11:10

## 2024-08-10 RX ADMIN — CLOZAPINE 200 MG: 100 TABLET ORAL at 20:55

## 2024-08-10 RX ADMIN — PANTOPRAZOLE SODIUM 40 MG: 40 INJECTION, POWDER, FOR SOLUTION INTRAVENOUS at 11:09

## 2024-08-10 RX ADMIN — CLONAZEPAM 1.5 MG: 1 TABLET ORAL at 20:55

## 2024-08-10 ASSESSMENT — PAIN SCALES - GENERAL: PAINLEVEL_OUTOF10: 0

## 2024-08-10 NOTE — PROGRESS NOTES
Department of Internal Medicine        CHIEF COMPLAINT: Altered mental status at nursing home    Reason for Admission: Acute kidney injury, severe hypernatremia, probable UTI    HISTORY OF PRESENT ILLNESS:      The patient is a 69 y.o. male who presents with bead at the nursing home and his baseline is alert and oriented x 2 but nonverbal.  Patient had a change in mental status and was sent in for evaluation.  There was question of having a fever at the nursing home.  Patient temperature was 98.5 in the ED with a heart rate 103 and blood pressure 98/50.  O2 sat 97% on room air at rest.  Serum sodium is 168 with potassium 4.4 with chloride at 130 and CO2 27.  BUN/creatinine 86/3.0 with a lactic acid 2.0.  Random blood sugar 104.  Transaminase normal with a WBC 12.1 and hemoglobin 10.4 with .4.  Urinalysis showed  WBCs with +2 bacteria and large leukocyte esterase.    8/9/2024  Patient seen and examined on PCU.  Patient is about the same.  Case discussed with patient's nurse.  No lab work back yet.  Temperature is 98 with heart rate of 96 and blood pressure 100/50.  O2 sat 98% room air at rest.  Patient to have his tube feedings restarted.    8/10/2024  Patient seen examined on PCU.  Patient is more alert and active today.  Patient trying to talk.  Case discussed with nurses aide at the bedside.  Serum sodium is 153 today with chloride 123 with BUN/creatinine of 59/2.7.  Procalcitonin was 1.3 yesterday and was elevated compared to admission.  Transaminase normal with a WBC of 8.6 and hemoglobin 7.4.  Temperature 99.2 axillary with heart rate 96 blood pressure ranges 92//51.  O2 sat 98% on room air at rest.  Urinary output is good.  Patient was started on his tube feedings yesterday.  Nursing states patient is tolerating tube feedings which has 0 to a residual    Past Medical History:    Past Medical History:   Diagnosis Date    Smoker 01/08/2024    4 cartons per month     Past Surgical History:     Past Surgical History:   Procedure Laterality Date    UPPER GASTROINTESTINAL ENDOSCOPY N/A 1/19/2024    EGD BIOPSY performed by Temo Barnett MD at [facility] ENDOSCOPY       Medications Prior to Admission:    @  Prior to Admission medications    Medication Sig Start Date End Date Taking? Authorizing Provider   Cranberry 500 MG CAPS Take 1 capsule by mouth every morning    Leonardo Vences MD   acetaminophen (TYLENOL) 325 MG tablet Take 2 tablets by mouth every 4 hours as needed for Pain or Fever    Leonardo Vences MD   magnesium hydroxide (MILK OF MAGNESIA) 400 MG/5ML suspension Take 30 mLs by mouth daily as needed for Constipation    Leonardo Vences MD   bisacodyl (DULCOLAX) 10 MG suppository Place 1 suppository rectally daily as needed for Constipation    Leonardo Vences MD   sodium phosphate (FLEET) 7-19 GM/118ML Place 1 enema rectally once as needed    Leonardo Vences MD   aluminum & magnesium hydroxide-simethicone (MAALOX) 200-200-20 MG/5ML SUSP suspension Take 15 mLs by mouth every 4 hours as needed for Indigestion    Leonardo Vences MD   loperamide (IMODIUM) 2 MG capsule Take 1 capsule by mouth 4 times daily as needed for Diarrhea Give 2 capsule by mouth as needed for Loose Stools May give 4 mg once, followed by 2 mg thereafter AND Give 1 capsule by mouth as needed for Loose Stools Do not exceed 8 mg/12 hours    Leonardo Vences MD   tamsulosin (FLOMAX) 0.4 MG capsule Take 1 capsule by mouth daily    Leonardo Vences MD   ipratropium 0.5 mg-albuterol 2.5 mg (DUONEB) 0.5-2.5 (3) MG/3ML SOLN nebulizer solution Inhale 3 mLs into the lungs every 6 hours as needed for Shortness of Breath 1/29/24   Michael Gallo DO   ondansetron (ZOFRAN-ODT) 4 MG disintegrating tablet Take 1 tablet by mouth every 8 hours as needed for Nausea or Vomiting 1/29/24   Michael Gallo DO   metoclopramide (REGLAN) 10 MG tablet Take 1 tablet by mouth 4 times daily as needed (nausea -

## 2024-08-10 NOTE — PROGRESS NOTES
SLP ALL NOTES    Spoke with patient's nurse this morning.  She stated that patient remains strict NPO and is 'too drowsy' to safely participate in a swallow evaluation.  Agreed to wait and check status on Monday 08/12/24.    Cynthia Thomas MA/[facility]-SLP  [id number]

## 2024-08-10 NOTE — PROGRESS NOTES
Dr. Douglas called back for an update on the pt. He ordered 500ml normal saline bolus, IV Protonix daily, q8 Hgb & Hct, and that his morning labs be drawn stat.

## 2024-08-11 LAB
ALBUMIN SERPL-MCNC: 2.3 G/DL (ref 3.5–5.2)
ALP SERPL-CCNC: 146 U/L (ref 40–129)
ALT SERPL-CCNC: 11 U/L (ref 0–40)
ANION GAP SERPL CALCULATED.3IONS-SCNC: 11 MMOL/L (ref 7–16)
AST SERPL-CCNC: 21 U/L (ref 0–39)
BASOPHILS # BLD: 0.07 K/UL (ref 0–0.2)
BASOPHILS NFR BLD: 1 % (ref 0–2)
BILIRUB SERPL-MCNC: <0.2 MG/DL (ref 0–1.2)
BILIRUB UR QL STRIP: NEGATIVE
BUN SERPL-MCNC: 51 MG/DL (ref 6–23)
CALCIUM SERPL-MCNC: 8.5 MG/DL (ref 8.6–10.2)
CHLORIDE SERPL-SCNC: 116 MMOL/L (ref 98–107)
CLARITY UR: CLEAR
CO2 SERPL-SCNC: 23 MMOL/L (ref 22–29)
COLOR UR: YELLOW
CREAT SERPL-MCNC: 2.4 MG/DL (ref 0.7–1.2)
EOSINOPHIL # BLD: 0 K/UL (ref 0.05–0.5)
EOSINOPHILS RELATIVE PERCENT: 0 % (ref 0–6)
EPI CELLS #/AREA URNS HPF: ABNORMAL /HPF
ERYTHROCYTE [DISTWIDTH] IN BLOOD BY AUTOMATED COUNT: 15.8 % (ref 11.5–15)
GFR, ESTIMATED: 29 ML/MIN/1.73M2
GLUCOSE SERPL-MCNC: 115 MG/DL (ref 74–99)
GLUCOSE UR STRIP-MCNC: 100 MG/DL
HCT VFR BLD AUTO: 27.3 % (ref 37–54)
HGB BLD-MCNC: 8.2 G/DL (ref 12.5–16.5)
HGB UR QL STRIP.AUTO: ABNORMAL
KETONES UR STRIP-MCNC: NEGATIVE MG/DL
LEUKOCYTE ESTERASE UR QL STRIP: ABNORMAL
LYMPHOCYTES NFR BLD: 1.21 K/UL (ref 1.5–4)
LYMPHOCYTES RELATIVE PERCENT: 16 % (ref 20–42)
MCH RBC QN AUTO: 29 PG (ref 26–35)
MCHC RBC AUTO-ENTMCNC: 30 G/DL (ref 32–34.5)
MCV RBC AUTO: 96.5 FL (ref 80–99.9)
MONOCYTES NFR BLD: 0.07 K/UL (ref 0.1–0.95)
MONOCYTES NFR BLD: 1 % (ref 2–12)
NEUTROPHILS NFR BLD: 83 % (ref 43–80)
NEUTS SEG NFR BLD: 6.36 K/UL (ref 1.8–7.3)
NITRITE UR QL STRIP: NEGATIVE
PH UR STRIP: 6 [PH] (ref 5–9)
PLATELET CONFIRMATION: NORMAL
PLATELET, FLUORESCENCE: 131 K/UL (ref 130–450)
PMV BLD AUTO: 12.8 FL (ref 7–12)
POTASSIUM SERPL-SCNC: 3.6 MMOL/L (ref 3.5–5)
PROCALCITONIN SERPL-MCNC: 0.84 NG/ML (ref 0–0.08)
PROT SERPL-MCNC: 6.3 G/DL (ref 6.4–8.3)
PROT UR STRIP-MCNC: 30 MG/DL
RBC # BLD AUTO: 2.83 M/UL (ref 3.8–5.8)
RBC # BLD: ABNORMAL 10*6/UL
RBC # BLD: ABNORMAL 10*6/UL
RBC #/AREA URNS HPF: ABNORMAL /HPF
SODIUM SERPL-SCNC: 150 MMOL/L (ref 132–146)
SP GR UR STRIP: 1.01 (ref 1–1.03)
UROBILINOGEN UR STRIP-ACNC: 0.2 EU/DL (ref 0–1)
WBC #/AREA URNS HPF: ABNORMAL /HPF
WBC OTHER # BLD: 7.7 K/UL (ref 4.5–11.5)

## 2024-08-11 PROCEDURE — 36415 COLL VENOUS BLD VENIPUNCTURE: CPT

## 2024-08-11 PROCEDURE — 6360000002 HC RX W HCPCS: Performed by: INTERNAL MEDICINE

## 2024-08-11 PROCEDURE — 2580000003 HC RX 258: Performed by: INTERNAL MEDICINE

## 2024-08-11 PROCEDURE — 6370000000 HC RX 637 (ALT 250 FOR IP): Performed by: INTERNAL MEDICINE

## 2024-08-11 PROCEDURE — 2500000003 HC RX 250 WO HCPCS: Performed by: INTERNAL MEDICINE

## 2024-08-11 PROCEDURE — 80053 COMPREHEN METABOLIC PANEL: CPT

## 2024-08-11 PROCEDURE — 85025 COMPLETE CBC W/AUTO DIFF WBC: CPT

## 2024-08-11 PROCEDURE — 81001 URINALYSIS AUTO W/SCOPE: CPT

## 2024-08-11 PROCEDURE — 84145 PROCALCITONIN (PCT): CPT

## 2024-08-11 PROCEDURE — 1200000000 HC SEMI PRIVATE

## 2024-08-11 RX ADMIN — CLONAZEPAM 0.5 MG: 0.5 TABLET ORAL at 09:53

## 2024-08-11 RX ADMIN — PANTOPRAZOLE SODIUM 40 MG: 40 INJECTION, POWDER, FOR SOLUTION INTRAVENOUS at 09:53

## 2024-08-11 RX ADMIN — CLONAZEPAM 0.25 MG: 0.5 TABLET ORAL at 15:00

## 2024-08-11 RX ADMIN — POTASSIUM CHLORIDE: 2 INJECTION, SOLUTION, CONCENTRATE INTRAVENOUS at 18:09

## 2024-08-11 RX ADMIN — CEFEPIME 1000 MG: 1 INJECTION, POWDER, FOR SOLUTION INTRAMUSCULAR; INTRAVENOUS at 18:08

## 2024-08-11 RX ADMIN — DOXYCYCLINE 100 MG: 100 INJECTION, POWDER, LYOPHILIZED, FOR SOLUTION INTRAVENOUS at 12:46

## 2024-08-11 RX ADMIN — CLOZAPINE 100 MG: 100 TABLET ORAL at 09:53

## 2024-08-11 RX ADMIN — CLONAZEPAM 1.5 MG: 1 TABLET ORAL at 22:19

## 2024-08-11 RX ADMIN — POTASSIUM CHLORIDE: 2 INJECTION, SOLUTION, CONCENTRATE INTRAVENOUS at 06:13

## 2024-08-11 RX ADMIN — CLOZAPINE 200 MG: 100 TABLET ORAL at 22:19

## 2024-08-11 ASSESSMENT — PAIN SCALES - GENERAL
PAINLEVEL_OUTOF10: 0
PAINLEVEL_OUTOF10: 0

## 2024-08-11 NOTE — PROGRESS NOTES
Department of Internal Medicine        CHIEF COMPLAINT: Altered mental status at nursing home    Reason for Admission: Acute kidney injury, severe hypernatremia, probable UTI    HISTORY OF PRESENT ILLNESS:      The patient is a 69 y.o. male who presents with bead at the nursing home and his baseline is alert and oriented x 2 but nonverbal.  Patient had a change in mental status and was sent in for evaluation.  There was question of having a fever at the nursing home.  Patient temperature was 98.5 in the ED with a heart rate 103 and blood pressure 98/50.  O2 sat 97% on room air at rest.  Serum sodium is 168 with potassium 4.4 with chloride at 130 and CO2 27.  BUN/creatinine 86/3.0 with a lactic acid 2.0.  Random blood sugar 104.  Transaminase normal with a WBC 12.1 and hemoglobin 10.4 with .4.  Urinalysis showed  WBCs with +2 bacteria and large leukocyte esterase.    8/9/2024  Patient seen and examined on PCU.  Patient is about the same.  Case discussed with patient's nurse.  No lab work back yet.  Temperature is 98 with heart rate of 96 and blood pressure 100/50.  O2 sat 98% room air at rest.  Patient to have his tube feedings restarted.    8/10/2024  Patient seen examined on PCU.  Patient is more alert and active today.  Patient trying to talk.  Case discussed with nurses aide at the bedside.  Serum sodium is 153 today with chloride 123 with BUN/creatinine of 59/2.7.  Procalcitonin was 1.3 yesterday and was elevated compared to admission.  Transaminase normal with a WBC of 8.6 and hemoglobin 7.4.  Temperature 99.2 axillary with heart rate 96 blood pressure ranges 92//51.  O2 sat 98% on room air at rest.  Urinary output is good.  Patient was started on his tube feedings yesterday.  Nursing states patient is tolerating tube feedings which has 0 to a residual    8/11/2024  Patient seen examined on PCU.  Patient is a little bit more alert again today.  Patient serum sodium is 150 with BUN/creatinine    Component Value Date/Time    COLORU Yellow 08/11/2024 02:42 AM    PROTEINU 30 08/11/2024 02:42 AM    PHUR 6.0 08/11/2024 02:42 AM    PHUR 5.5 01/13/2024 06:00 PM    WBCUA 6 TO 9 08/11/2024 02:42 AM    RBCUA 3 to 5 08/11/2024 02:42 AM    BACTERIA 2+ 08/08/2024 08:33 AM    LEUKOCYTESUR SMALL 08/11/2024 02:42 AM    UROBILINOGEN 0.2 08/11/2024 02:42 AM    BILIRUBINUR NEGATIVE 08/11/2024 02:42 AM    GLUCOSEU 100 08/11/2024 02:42 AM     ABG:    Lab Results   Component Value Date/Time    PH 7.484 01/08/2024 02:12 AM    PCO2 29.6 01/08/2024 02:12 AM    PO2 67.1 01/08/2024 02:12 AM    HCO3 21.7 01/08/2024 02:12 AM    BE -0.5 01/08/2024 02:12 AM    O2SAT 94.5 01/08/2024 02:12 AM     HgBA1c:    Lab Results   Component Value Date/Time    LABA1C 6.3 01/08/2024 12:04 PM     FLP:    Lab Results   Component Value Date/Time    TRIG 71 08/09/2024 02:10 PM    HDL 31 08/09/2024 02:10 PM     TSH:    Lab Results   Component Value Date/Time    TSH 4.89 08/09/2024 02:10 PM     IRON:    Lab Results   Component Value Date/Time    IRON 28 08/08/2024 06:17 PM     LIPASE:    Lab Results   Component Value Date/Time    LIPASE 23 01/08/2024 02:00 AM       ASSESSMENT AND PLAN:      Patient Active Problem List    Diagnosis Date Noted    Hypernatremia 08/08/2024    Altered mental status 01/08/2024     Impression:  Severe hypernatremia  Acute kidney injury  Acute UTI  Altered mental status-secondary to #1, 3  Non-insulin-dependent diabetes mellitus  History of COPD  Macrocytic anemia  Possible right lower lobe pneumonia versus atelectasis  Constipation  History of paranoid schizophrenia    Plan:  Admit to monitored bed  Nursing Home meds reviewed  Urine culture  Heparin 5000 units subcu twice daily  Activity as tolerated  Serum and urine osmolality  IV fluids D5W at 100 cc an hour  Glucoscans 4 times daily with sliding scale insulin  Rocephin 1 g IV piggyback daily  Procalcitonin  Serum iron, B12 and folic acid  IV Vibramycin 100 mg twice

## 2024-08-12 LAB
ALBUMIN SERPL-MCNC: 2.6 G/DL (ref 3.5–5.2)
ALP SERPL-CCNC: 158 U/L (ref 40–129)
ALT SERPL-CCNC: 13 U/L (ref 0–40)
ANION GAP SERPL CALCULATED.3IONS-SCNC: 11 MMOL/L (ref 7–16)
AST SERPL-CCNC: 21 U/L (ref 0–39)
BASOPHILS # BLD: 0.01 K/UL (ref 0–0.2)
BASOPHILS NFR BLD: 0 % (ref 0–2)
BILIRUB SERPL-MCNC: 0.2 MG/DL (ref 0–1.2)
BUN SERPL-MCNC: 41 MG/DL (ref 6–23)
CALCIUM SERPL-MCNC: 8.9 MG/DL (ref 8.6–10.2)
CHLORIDE SERPL-SCNC: 112 MMOL/L (ref 98–107)
CO2 SERPL-SCNC: 22 MMOL/L (ref 22–29)
CREAT SERPL-MCNC: 2 MG/DL (ref 0.7–1.2)
EOSINOPHIL # BLD: 0.06 K/UL (ref 0.05–0.5)
EOSINOPHILS RELATIVE PERCENT: 1 % (ref 0–6)
ERYTHROCYTE [DISTWIDTH] IN BLOOD BY AUTOMATED COUNT: 15.6 % (ref 11.5–15)
GFR, ESTIMATED: 37 ML/MIN/1.73M2
GLUCOSE BLD-MCNC: 137 MG/DL (ref 74–99)
GLUCOSE SERPL-MCNC: 109 MG/DL (ref 74–99)
HCT VFR BLD AUTO: 29.4 % (ref 37–54)
HGB BLD-MCNC: 8.9 G/DL (ref 12.5–16.5)
IMM GRANULOCYTES # BLD AUTO: 0.09 K/UL (ref 0–0.58)
IMM GRANULOCYTES NFR BLD: 1 % (ref 0–5)
LYMPHOCYTES NFR BLD: 1.37 K/UL (ref 1.5–4)
LYMPHOCYTES RELATIVE PERCENT: 18 % (ref 20–42)
MCH RBC QN AUTO: 29.1 PG (ref 26–35)
MCHC RBC AUTO-ENTMCNC: 30.3 G/DL (ref 32–34.5)
MCV RBC AUTO: 96.1 FL (ref 80–99.9)
MONOCYTES NFR BLD: 0.32 K/UL (ref 0.1–0.95)
MONOCYTES NFR BLD: 4 % (ref 2–12)
NEUTROPHILS NFR BLD: 76 % (ref 43–80)
NEUTS SEG NFR BLD: 5.73 K/UL (ref 1.8–7.3)
PLATELET CONFIRMATION: NORMAL
PLATELET, FLUORESCENCE: 142 K/UL (ref 130–450)
PMV BLD AUTO: 13.6 FL (ref 7–12)
POTASSIUM SERPL-SCNC: 4.4 MMOL/L (ref 3.5–5)
PROT SERPL-MCNC: 6.6 G/DL (ref 6.4–8.3)
RBC # BLD AUTO: 3.06 M/UL (ref 3.8–5.8)
RBC # BLD: ABNORMAL 10*6/UL
SODIUM SERPL-SCNC: 145 MMOL/L (ref 132–146)
WBC OTHER # BLD: 7.6 K/UL (ref 4.5–11.5)

## 2024-08-12 PROCEDURE — 97535 SELF CARE MNGMENT TRAINING: CPT

## 2024-08-12 PROCEDURE — 97165 OT EVAL LOW COMPLEX 30 MIN: CPT

## 2024-08-12 PROCEDURE — 82962 GLUCOSE BLOOD TEST: CPT

## 2024-08-12 PROCEDURE — 80053 COMPREHEN METABOLIC PANEL: CPT

## 2024-08-12 PROCEDURE — 2500000003 HC RX 250 WO HCPCS: Performed by: INTERNAL MEDICINE

## 2024-08-12 PROCEDURE — 85025 COMPLETE CBC W/AUTO DIFF WBC: CPT

## 2024-08-12 PROCEDURE — 2580000003 HC RX 258: Performed by: INTERNAL MEDICINE

## 2024-08-12 PROCEDURE — 97530 THERAPEUTIC ACTIVITIES: CPT

## 2024-08-12 PROCEDURE — 6360000002 HC RX W HCPCS: Performed by: INTERNAL MEDICINE

## 2024-08-12 PROCEDURE — 36415 COLL VENOUS BLD VENIPUNCTURE: CPT

## 2024-08-12 PROCEDURE — 92610 EVALUATE SWALLOWING FUNCTION: CPT

## 2024-08-12 PROCEDURE — 6370000000 HC RX 637 (ALT 250 FOR IP): Performed by: INTERNAL MEDICINE

## 2024-08-12 PROCEDURE — 1200000000 HC SEMI PRIVATE

## 2024-08-12 RX ORDER — SODIUM CHLORIDE AND POTASSIUM CHLORIDE 150; 450 MG/100ML; MG/100ML
INJECTION, SOLUTION INTRAVENOUS CONTINUOUS
Status: DISCONTINUED | OUTPATIENT
Start: 2024-08-12 | End: 2024-08-15 | Stop reason: HOSPADM

## 2024-08-12 RX ADMIN — CLONAZEPAM 0.25 MG: 0.5 TABLET ORAL at 14:22

## 2024-08-12 RX ADMIN — CLOZAPINE 100 MG: 100 TABLET ORAL at 10:19

## 2024-08-12 RX ADMIN — DOXYCYCLINE 100 MG: 100 INJECTION, POWDER, LYOPHILIZED, FOR SOLUTION INTRAVENOUS at 12:25

## 2024-08-12 RX ADMIN — BENZTROPINE MESYLATE 2 MG: 1 TABLET ORAL at 21:28

## 2024-08-12 RX ADMIN — DOXYCYCLINE 100 MG: 100 INJECTION, POWDER, LYOPHILIZED, FOR SOLUTION INTRAVENOUS at 00:34

## 2024-08-12 RX ADMIN — POTASSIUM CHLORIDE AND SODIUM CHLORIDE: 450; 150 INJECTION, SOLUTION INTRAVENOUS at 14:30

## 2024-08-12 RX ADMIN — PANTOPRAZOLE SODIUM 40 MG: 40 INJECTION, POWDER, FOR SOLUTION INTRAVENOUS at 10:19

## 2024-08-12 RX ADMIN — HEPARIN SODIUM 5000 UNITS: 5000 INJECTION INTRAVENOUS; SUBCUTANEOUS at 21:28

## 2024-08-12 RX ADMIN — CLONAZEPAM 0.5 MG: 0.5 TABLET ORAL at 10:32

## 2024-08-12 RX ADMIN — CLOZAPINE 200 MG: 100 TABLET ORAL at 21:28

## 2024-08-12 RX ADMIN — CLONAZEPAM 1.5 MG: 1 TABLET ORAL at 21:28

## 2024-08-12 RX ADMIN — CEFEPIME 1000 MG: 1 INJECTION, POWDER, FOR SOLUTION INTRAMUSCULAR; INTRAVENOUS at 17:08

## 2024-08-12 ASSESSMENT — PAIN SCALES - GENERAL
PAINLEVEL_OUTOF10: 0

## 2024-08-12 NOTE — PROGRESS NOTES
08/12/24 1244   Encounter Summary   Encounter Overview/Reason Initial Encounter;Spiritual/Emotional Needs   Service Provided For Patient   Referral/Consult From Rounding   Support System Unknown   Last Encounter  08/12/24  (CW)   Complexity of Encounter High   Spiritual/Emotional needs   Type Spiritual Support   Assessment/Intervention/Outcome   Assessment Anxious;Despair   Intervention Active listening;Prayer (assurance of)/[place];Read/Provided Scripture;Sustaining Presence/Ministry of presence   Outcome Comfort;Expressed Gratitude;Peace;Receptive      visited patient. Patient was in bed. Patient appeared to be anxious and in despair.  provided a listening presence, empathy and offered prayer. Spiritual care is ongoing as needed.      Herman Enciso [phone]

## 2024-08-12 NOTE — PROGRESS NOTES
will improve the patients safety in the NH facility and the safety of the staff who provide care.         PHYSICAL THERAPY  PLAN OF CARE       Physical therapy plan of care is established based on physician order,  patient diagnosis and clinical assessment    Current Treatment Recommendations:    -Bed Mobility: Lower and upper extremity exercises, and trunk control activities  -Sitting Balance: Incorporate reaching activities to activate trunk muscles   -Transfers: Provide instruction on proper hand and foot position for adequate transfer of weight onto lower extremities and use of gait device if needed and Cues for hand placement, technique and safety. Provide stabilization to prevent fall   -Positioning for skin integrity and comfort    PT long term treatment goals are located in below grid    Patient and or family understand(s) diagnosis, prognosis, and plan of care.    Frequency of treatments: Patient will be seen  daily.         Prior Level of Function: Bed bound  Rehab Potential: poor  for baseline    Past medical history:   Past Medical History:   Diagnosis Date    Smoker 01/08/2024    4 cartons per month     Past Surgical History:   Procedure Laterality Date    UPPER GASTROINTESTINAL ENDOSCOPY N/A 1/19/2024    EGD BIOPSY performed by Temo Barnett MD at [facility] ENDOSCOPY       SUBJECTIVE:    Precautions: Use lift equipment for lifting patient , confusion ,      Social history: Patient lives in a skilled nursing facility      Equipment owned: Equipment at Nursing Home,       AM-PAC Basic Mobility        AM-PAC Basic Mobility - Inpatient   How much help is needed turning from your back to your side while in a flat bed without using bedrails?: Total  How much help is needed moving from lying on your back to sitting on the side of a flat bed without using bedrails?: Total  How much help is needed moving to and from a bed to a chair?: Total  How much help is needed standing up from a chair using your arms?:      Vitals: room air   Blood Pressure at rest  Blood Pressure during session    Heart Rate at rest   Heart Rate during session     SPO2 at rest  %  SPO2 during session  %     Patient education  Patient educated on role of Physical Therapy, risks of immobility, safety and plan of care     Patient response to education:   Pt verbalized understanding Pt demonstrated skill Pt requires further education in this area   No No Yes      Treatment:  Patient practiced and was instructed/facilitated in the following treatment: Patient rolled multiple times due to incontinent of BM, for hygiene, changing chux pads, TAPS sheet, and bed linens. Pt needing his gown changed, pulled up towards HOB was dep x 2, head elevated to 30 degrees for tube feed, and repositioned for skin integrity.   .        Therapeutic Exercises:  not performed      At end of session, patient in bed with alarm call light and phone within reach,  all lines and tubes intact, nursing notified.      Patient would benefit from continued skilled Physical Therapy to improve functional independence and quality of life.         Patient's/ family goals   none stated    Time in  09:30  Time out  09:55    Total Treatment Time  25 minutes        CPT codes:    Therapeutic activities (80547)   25 minutes  2 unit(s)    Josr Stahl  [facility]  LIC # [id number]

## 2024-08-12 NOTE — PROGRESS NOTES
07:46 AM    BILITOT 0.2 08/12/2024 07:46 AM    ALKPHOS 158 08/12/2024 07:46 AM    AST 21 08/12/2024 07:46 AM    ALT 13 08/12/2024 07:46 AM     Magnesium:    Lab Results   Component Value Date/Time    MG 2.4 08/09/2024 02:10 PM     Phosphorus:    Lab Results   Component Value Date/Time    PHOS 2.4 08/09/2024 02:10 PM     PT/INR:    Lab Results   Component Value Date/Time    PROTIME 13.9 08/09/2024 11:46 PM    INR 1.3 08/09/2024 11:46 PM     Troponin:  No results found for: \"TROPONINI\"  U/A:    Lab Results   Component Value Date/Time    COLORU Yellow 08/11/2024 02:42 AM    PROTEINU 30 08/11/2024 02:42 AM    PHUR 6.0 08/11/2024 02:42 AM    PHUR 5.5 01/13/2024 06:00 PM    WBCUA 6 TO 9 08/11/2024 02:42 AM    RBCUA 3 to 5 08/11/2024 02:42 AM    BACTERIA 2+ 08/08/2024 08:33 AM    LEUKOCYTESUR SMALL 08/11/2024 02:42 AM    UROBILINOGEN 0.2 08/11/2024 02:42 AM    BILIRUBINUR NEGATIVE 08/11/2024 02:42 AM    GLUCOSEU 100 08/11/2024 02:42 AM     ABG:    Lab Results   Component Value Date/Time    PH 7.484 01/08/2024 02:12 AM    PCO2 29.6 01/08/2024 02:12 AM    PO2 67.1 01/08/2024 02:12 AM    HCO3 21.7 01/08/2024 02:12 AM    BE -0.5 01/08/2024 02:12 AM    O2SAT 94.5 01/08/2024 02:12 AM     HgBA1c:    Lab Results   Component Value Date/Time    LABA1C 6.3 01/08/2024 12:04 PM     FLP:    Lab Results   Component Value Date/Time    TRIG 71 08/09/2024 02:10 PM    HDL 31 08/09/2024 02:10 PM     TSH:    Lab Results   Component Value Date/Time    TSH 4.89 08/09/2024 02:10 PM     IRON:    Lab Results   Component Value Date/Time    IRON 28 08/08/2024 06:17 PM     LIPASE:    Lab Results   Component Value Date/Time    LIPASE 23 01/08/2024 02:00 AM       ASSESSMENT AND PLAN:      Patient Active Problem List    Diagnosis Date Noted    Hypernatremia 08/08/2024    Altered mental status 01/08/2024     Impression:  Severe hypernatremia  Acute kidney injury  Acute UTI  Altered mental status-secondary to #1, 3  Non-insulin-dependent diabetes

## 2024-08-12 NOTE — CARE COORDINATION
8/12/2024 1136a ()  NOTE: Per Debbi @ [facility], pt will need new auth before he can return to [facility]. ANNI faxed updated chart notes to Wander @ [facility]. Wander to notify ANNI when new [facility] Auth is obtained.     Electronically signed by KVNG Heck on 8/12/2024 at 11:38 AM

## 2024-08-12 NOTE — PLAN OF CARE
Problem: Discharge Planning  Goal: Discharge to home or other facility with appropriate resources  Outcome: Progressing  Flowsheets (Taken 8/10/2024 0800 by Brittni Live, RN)  Discharge to home or other facility with appropriate resources:   Identify barriers to discharge with patient and caregiver   Arrange for needed discharge resources and transportation as appropriate     Problem: Skin/Tissue Integrity  Goal: Absence of new skin breakdown  Description: 1.  Monitor for areas of redness and/or skin breakdown  2.  Assess vascular access sites hourly  3.  Every 4-6 hours minimum:  Change oxygen saturation probe site  4.  Every 4-6 hours:  If on nasal continuous positive airway pressure, respiratory therapy assess nares and determine need for appliance change or resting period.  Outcome: Progressing  Note: No new skin breakdown noted     Problem: Safety - Adult  Goal: Free from fall injury  Outcome: Progressing  Flowsheets (Taken 8/12/2024 1200)  Free From Fall Injury: Instruct family/caregiver on patient safety     Problem: Pain  Goal: Verbalizes/displays adequate comfort level or baseline comfort level  Outcome: Progressing  Flowsheets (Taken 8/12/2024 1824)  Verbalizes/displays adequate comfort level or baseline comfort level: Encourage patient to monitor pain and request assistance     Problem: Nutrition Deficit:  Goal: Optimize nutritional status  Outcome: Progressing  Flowsheets (Taken 8/9/2024 1134 by Andra Krishnan, RD, LD)  Nutrient intake appropriate for improving, restoring, or maintaining nutritional needs:   Assess nutritional status and recommend course of action   Monitor oral intake, labs, and treatment plans   Recommend appropriate diets, oral nutritional supplements, and vitamin/mineral supplements   Recommend, monitor, and adjust tube feedings and TPN/PPN based on assessed needs

## 2024-08-12 NOTE — PROGRESS NOTES
OCCUPATIONAL THERAPY INITIAL EVALUATION    [facility]  [address] [place]        Date:2024                                                  Patient Name: Aureliano Falcon    MRN: [id number]    : 1955    Room: [address] [place] [address]      Evaluating OT: Estevan Bailon OTR/L; #[id number]     Referring Provider and Specific Provider Orders/Date:      24 1600  OT eval and treat  Start:  24 1600,   End:  24 1600,   ONE TIME,   Standing Count:  1 Occurrences,   R         Michael Gallo, DO      Placement Recommendation: SNF       Diagnosis:   1. Altered mental status, unspecified altered mental status type    2. Dehydration    3. Hypernatremia    4. Acute renal failure, unspecified acute renal failure type (HCC)    5. Acute cystitis without hematuria    6. Pneumonia due to infectious organism, unspecified laterality, unspecified part of lung    7. Septicemia (HCC)         Surgery: None      Pertinent Medical History:       Past Medical History:   Diagnosis Date    Smoker 2024    4 cartons per month         Past Surgical History:   Procedure Laterality Date    UPPER GASTROINTESTINAL ENDOSCOPY N/A 2024    EGD BIOPSY performed by Temo Barnett MD at [facility] ENDOSCOPY        Precautions:  Use lift equipment for lifting patient , confusion , Fall Risk     Assessment of current deficits:     [x] Functional mobility  [x]ADLs  [x] Strength               [x]Cognition    [x] Functional transfers   [x] IADLs         [x] Safety Awareness   [x]Endurance    [] Fine Coordination              [x] Balance      [] Vision/perception   []Sensation     [x]Gross Motor Coordination  [x] ROM  [] Delirium                   [x] Motor Control     OT PLAN OF CARE   OT POC based on physician orders, patient diagnosis and results of clinical assessment    Frequency/Duration 1-3 days/wk for 2 weeks PRN     Specific OT Treatment Interventions to include:   *  Status  Date: STGs = LTGs  Time frame: 10-14 days   Feeding Dependent     PEG Feeding  Moderate Assist     Focus on goal when appropriate.    Grooming Maximal Assist   Moderate Assist    UB Dressing Maximal Assist     Gown Management  Moderate Assist    LB Dressing Dependent   Maximal Assist    Bathing Maximal Assist    Patient able to wash face and chest with mod-max verbal and tactile cues.   Moderate Assist    Toileting Dependent     BM in bed; Dep for hygiene  Dependent    Bed Mobility  Supine to sit: Not assessed  d/t pt overall debility, decreased activity tolerance, balance deficits, safety and fall risk.     Sit to supine: Not assessed  d/t pt overall debility, decreased activity tolerance, balance deficits, safety and fall risk.     Rolling:Maximal Assist    Supine to sit: Moderate Assist   Sit to supine: Moderate Assist   Rolling:Minimal Assist     Functional Transfers Not assessed  d/t pt overall debility, decreased activity tolerance, balance deficits, safety and fall risk.     Bed bound   Functional Mobility Not assessed  d/t pt overall debility, decreased activity tolerance, balance deficits, safety and fall risk.    [place]    Balance Sitting:     Static: poor+    Dynamic: poor    Sitting:     Static: fair     Dynamic: fair      Activity Tolerance fair -  good    Visual/  Perceptual Glasses: None present                 Hand Dominance: Favors R     AROM (PROM) Strength Additional Info:  Goal:   RUE  WFL 3+/5 Fair  and Fair FMC/dexterity noted during ADL tasks   Improve overall RUE strength  for participation in functional tasks   LUE WFL 3+/5 Fair  and Fair FMC/dexterity noted during ADL tasks   Improve overall LUE strength  for participation in functional tasks          Hearing: WFL   Sensation:  No c/o numbness or tingling  Tone: WFL   Edema: None    Comments: Nursing approved therapy session. Upon arrival the patient was supine with HOB elevated.  At end of session, patient was supine with

## 2024-08-12 NOTE — PROGRESS NOTES
SPEECH/LANGUAGE PATHOLOGY  CLINICAL ASSESSMENT OF SWALLOWING FUNCTION   and PLAN OF CARE    PATIENT NAME:  Aureliano Falcon  (male)     MRN:  [id number]    :  1955  (69 y.o.)  STATUS:  Inpatient: Room 0536/0536-02    TODAY'S DATE:  2024  REFERRING PROVIDER:   Michael Gallo DO   SPECIFIC PROVIDER ORDER: SLP eval and treat Date of order:  24  REASON FOR REFERRAL: possible aspiration   EVALUATING THERAPIST: GREG Lau                 RESULTS:    DYSPHAGIA DIAGNOSIS:   limited intake on exam, not able to determine type or severity of dysphagia       DIET RECOMMENDATIONS:    limited intake on exam, not able to determine type or severity of dysphagia       Coated tsp of pureed consistency administered x 2. Patient w/ immediate cough followed by a large emesis. No further PO trials administered and RN updated in person.      FEEDING RECOMMENDATIONS:     Assistance level:  Not applicable      Compensatory strategies recommended: Thorough oral care to prevent colonization of oral bacteria       Discussed recommendations with:  patient nurse in person    SPEECH THERAPY  PLAN OF CARE   The dysphagia POC is established based on physician order, dysphagia diagnosis and results of clinical assessment     Skilled SLP intervention for dysphagia management on acute care up to 5 x per week until goals met, pt plateaus in function and/or discharged from hospital    Conditions Requiring Skilled Therapeutic Intervention for dysphagia:    Patient is performing below functional baseline d/t  current acute condition, respiratory compromise, multiple medications, and/or increased dependency upon caregivers.    Specific dysphagia interventions to include:     ongoing evaluation of swallow function to determine when PO diet can be safely initiated    Specific instructions for next treatment:  ongoing skilled PO analysis to determine if PO diet can be initiated   Patient Treatment Goals:    Short Term Goals:  Pt will

## 2024-08-12 NOTE — PLAN OF CARE
Problem: Discharge Planning  Goal: Discharge to home or other facility with appropriate resources  8/12/2024 0142 by Denise Plunkett RN  Outcome: Progressing  8/11/2024 1627 by Dai Yanez RN  Outcome: Progressing     Problem: Skin/Tissue Integrity  Goal: Absence of new skin breakdown  Description: 1.  Monitor for areas of redness and/or skin breakdown  2.  Assess vascular access sites hourly  3.  Every 4-6 hours minimum:  Change oxygen saturation probe site  4.  Every 4-6 hours:  If on nasal continuous positive airway pressure, respiratory therapy assess nares and determine need for appliance change or resting period.  8/12/2024 0142 by Denise Plunkett RN  Outcome: Progressing  8/11/2024 1627 by Dai Yanez RN  Outcome: Progressing     Problem: Safety - Adult  Goal: Free from fall injury  8/12/2024 0142 by Denise Plunkett RN  Outcome: Progressing  8/11/2024 1627 by Dai Yanez RN  Outcome: Progressing     Problem: Pain  Goal: Verbalizes/displays adequate comfort level or baseline comfort level  8/12/2024 0142 by Denise Plunkett RN  Outcome: Progressing  8/11/2024 1627 by Dai Yanez RN  Outcome: Progressing     Problem: Nutrition Deficit:  Goal: Optimize nutritional status  8/12/2024 0142 by Denise Plunkett RN  Outcome: Progressing  8/11/2024 1627 by Dai Yanez RN  Outcome: Progressing

## 2024-08-13 LAB
ALBUMIN SERPL-MCNC: 2.2 G/DL (ref 3.5–5.2)
ALP SERPL-CCNC: 133 U/L (ref 40–129)
ALT SERPL-CCNC: 13 U/L (ref 0–40)
ANION GAP SERPL CALCULATED.3IONS-SCNC: 7 MMOL/L (ref 7–16)
AST SERPL-CCNC: 18 U/L (ref 0–39)
BASOPHILS # BLD: 0.02 K/UL (ref 0–0.2)
BASOPHILS NFR BLD: 0 % (ref 0–2)
BILIRUB SERPL-MCNC: <0.2 MG/DL (ref 0–1.2)
BUN SERPL-MCNC: 37 MG/DL (ref 6–23)
CALCIUM SERPL-MCNC: 8.8 MG/DL (ref 8.6–10.2)
CHLORIDE SERPL-SCNC: 111 MMOL/L (ref 98–107)
CO2 SERPL-SCNC: 24 MMOL/L (ref 22–29)
CREAT SERPL-MCNC: 1.8 MG/DL (ref 0.7–1.2)
EOSINOPHIL # BLD: 0.04 K/UL (ref 0.05–0.5)
EOSINOPHILS RELATIVE PERCENT: 1 % (ref 0–6)
ERYTHROCYTE [DISTWIDTH] IN BLOOD BY AUTOMATED COUNT: 15.5 % (ref 11.5–15)
GFR, ESTIMATED: 41 ML/MIN/1.73M2
GLUCOSE BLD-MCNC: 123 MG/DL (ref 74–99)
GLUCOSE SERPL-MCNC: 119 MG/DL (ref 74–99)
HCT VFR BLD AUTO: 28 % (ref 37–54)
HGB BLD-MCNC: 8.4 G/DL (ref 12.5–16.5)
IMM GRANULOCYTES # BLD AUTO: 0.09 K/UL (ref 0–0.58)
IMM GRANULOCYTES NFR BLD: 1 % (ref 0–5)
LYMPHOCYTES NFR BLD: 1.39 K/UL (ref 1.5–4)
LYMPHOCYTES RELATIVE PERCENT: 19 % (ref 20–42)
MCH RBC QN AUTO: 29 PG (ref 26–35)
MCHC RBC AUTO-ENTMCNC: 30 G/DL (ref 32–34.5)
MCV RBC AUTO: 96.6 FL (ref 80–99.9)
MICROORGANISM SPEC CULT: ABNORMAL
MICROORGANISM SPEC CULT: NORMAL
MICROORGANISM SPEC CULT: NORMAL
MONOCYTES NFR BLD: 0.39 K/UL (ref 0.1–0.95)
MONOCYTES NFR BLD: 5 % (ref 2–12)
NEUTROPHILS NFR BLD: 74 % (ref 43–80)
NEUTS SEG NFR BLD: 5.52 K/UL (ref 1.8–7.3)
PLATELET # BLD AUTO: 161 K/UL (ref 130–450)
PMV BLD AUTO: 12.9 FL (ref 7–12)
POTASSIUM SERPL-SCNC: 4.5 MMOL/L (ref 3.5–5)
PROT SERPL-MCNC: 6.3 G/DL (ref 6.4–8.3)
RBC # BLD AUTO: 2.9 M/UL (ref 3.8–5.8)
SERVICE CMNT-IMP: ABNORMAL
SERVICE CMNT-IMP: NORMAL
SERVICE CMNT-IMP: NORMAL
SODIUM SERPL-SCNC: 142 MMOL/L (ref 132–146)
SPECIMEN DESCRIPTION: ABNORMAL
SPECIMEN DESCRIPTION: NORMAL
SPECIMEN DESCRIPTION: NORMAL
WBC OTHER # BLD: 7.5 K/UL (ref 4.5–11.5)

## 2024-08-13 PROCEDURE — 80053 COMPREHEN METABOLIC PANEL: CPT

## 2024-08-13 PROCEDURE — 36415 COLL VENOUS BLD VENIPUNCTURE: CPT

## 2024-08-13 PROCEDURE — 6370000000 HC RX 637 (ALT 250 FOR IP): Performed by: INTERNAL MEDICINE

## 2024-08-13 PROCEDURE — 92526 ORAL FUNCTION THERAPY: CPT | Performed by: SPEECH-LANGUAGE PATHOLOGIST

## 2024-08-13 PROCEDURE — 2580000003 HC RX 258: Performed by: INTERNAL MEDICINE

## 2024-08-13 PROCEDURE — 85025 COMPLETE CBC W/AUTO DIFF WBC: CPT

## 2024-08-13 PROCEDURE — 2500000003 HC RX 250 WO HCPCS: Performed by: INTERNAL MEDICINE

## 2024-08-13 PROCEDURE — 97110 THERAPEUTIC EXERCISES: CPT

## 2024-08-13 PROCEDURE — 82962 GLUCOSE BLOOD TEST: CPT

## 2024-08-13 PROCEDURE — 1200000000 HC SEMI PRIVATE

## 2024-08-13 PROCEDURE — 6360000002 HC RX W HCPCS: Performed by: INTERNAL MEDICINE

## 2024-08-13 RX ADMIN — CLONAZEPAM 0.25 MG: 0.5 TABLET ORAL at 14:28

## 2024-08-13 RX ADMIN — AMPICILLIN SODIUM 2000 MG: 2 INJECTION, POWDER, FOR SOLUTION INTRAMUSCULAR; INTRAVENOUS at 14:37

## 2024-08-13 RX ADMIN — DOXYCYCLINE 100 MG: 100 INJECTION, POWDER, LYOPHILIZED, FOR SOLUTION INTRAVENOUS at 22:08

## 2024-08-13 RX ADMIN — BENZTROPINE MESYLATE 2 MG: 1 TABLET ORAL at 10:02

## 2024-08-13 RX ADMIN — HEPARIN SODIUM 5000 UNITS: 5000 INJECTION INTRAVENOUS; SUBCUTANEOUS at 21:53

## 2024-08-13 RX ADMIN — BENZTROPINE MESYLATE 2 MG: 1 TABLET ORAL at 21:52

## 2024-08-13 RX ADMIN — POTASSIUM CHLORIDE AND SODIUM CHLORIDE: 450; 150 INJECTION, SOLUTION INTRAVENOUS at 16:13

## 2024-08-13 RX ADMIN — CLONAZEPAM 0.5 MG: 0.5 TABLET ORAL at 10:02

## 2024-08-13 RX ADMIN — DOXYCYCLINE 100 MG: 100 INJECTION, POWDER, LYOPHILIZED, FOR SOLUTION INTRAVENOUS at 00:36

## 2024-08-13 RX ADMIN — TAMSULOSIN HYDROCHLORIDE 0.4 MG: 0.4 CAPSULE ORAL at 10:03

## 2024-08-13 RX ADMIN — PANTOPRAZOLE SODIUM 40 MG: 40 INJECTION, POWDER, FOR SOLUTION INTRAVENOUS at 10:03

## 2024-08-13 RX ADMIN — DOXYCYCLINE 100 MG: 100 INJECTION, POWDER, LYOPHILIZED, FOR SOLUTION INTRAVENOUS at 12:12

## 2024-08-13 RX ADMIN — CLOZAPINE 100 MG: 100 TABLET ORAL at 10:02

## 2024-08-13 RX ADMIN — CLONAZEPAM 1.5 MG: 1 TABLET ORAL at 21:53

## 2024-08-13 RX ADMIN — CLOZAPINE 200 MG: 100 TABLET ORAL at 21:52

## 2024-08-13 RX ADMIN — HEPARIN SODIUM 5000 UNITS: 5000 INJECTION INTRAVENOUS; SUBCUTANEOUS at 10:03

## 2024-08-13 ASSESSMENT — PAIN SCALES - GENERAL
PAINLEVEL_OUTOF10: 0
PAINLEVEL_OUTOF10: 0

## 2024-08-13 NOTE — PROGRESS NOTES
Comprehensive Nutrition Assessment    Type and Reason for Visit:  Reassess    Nutrition Recommendations/Plan:   Continue NPO  Continue Current Tube Feeding:    Standard with Fiber (Jevity 1.5 daniel) @ 10mls increasing 15 mls q6 hours as tolerated to goal of 50mls/hr x 24hrs, additional 30 mls q4 hrs fwf  to provide: 1750 kcals , 75 grams of protein, 1,092 mls free water, providing 100% of calorie and protein needs per day. Reg via PEG tube BID for sacral wound.     RD Note: Pt was on oral intake and Tube Feeds at nursing home, however pt had a -24% weight loss in six months suggesting his po intake is minimal to none. This RD recommends pt is strictly NPO and receive all nutrition through tube feed. Recommended Tube Feed above has been calculated for slow weight gain and meets 100% of calorie, and protein needs per day. Will adjust H20 requirements for nursing home upon discharge.    3. Consult RD as needed      Malnutrition Assessment:  Malnutrition Status:  Severe malnutrition (08/09/24 1116)    Context:  Chronic Illness     Findings of the 6 clinical characteristics of malnutrition:  Energy Intake:  Unable to assess (Nonverbal)  Weight Loss:  Greater than 10% over 6 months (-24% in six months)     Body Fat Loss:  Severe body fat loss Orbital, Triceps   Muscle Mass Loss:  Severe muscle mass loss Temples (temporalis), Clavicles (pectoralis & deltoids)  Fluid Accumulation:  No significant fluid accumulation Extremities   Strength:  Not Performed    Nutrition Assessment:    Pt admit for hypernatremia, AMS and BREN 2/2 UTI, RLL PNA. Pt is nonverbal at baseline. PMHx: smoker, hepatic steatosis, Dysphagia, Gastric Ulcers, Gastroesophageal Junction Polyp, Esophagitis. Pt is NPO. Per Nursing pt is on Tube Feeds and oral intake at nursing home. This RD believes PO intake is minimal or O% as pt had -24% weight loss in six months, recommend strictly tube feeds in order to meet daily calorie, fluid and protein goals per

## 2024-08-13 NOTE — PROGRESS NOTES
SPEECH LANGUAGE PATHOLOGY  DAILY PROGRESS NOTE      PATIENT NAME:  Aureliano Falcon      :  1955          TODAY'S DATE:  2024 ROOM:  [facility]/0536-    Current Diet: ADULT TUBE FEEDING; PEG; Standard with Fiber; Continuous; 15; Yes; 15; Q 6 hours; 50; 30; Q 4 hours; Wound Healing; 1 Dose; BID    Patient seen for dysphagia therapy patient opens eyes speech is not intelligible.  Family present and reports that he has not been able to eat for a long time that he throws up she reports that when SLP was there yesterday he after 2 bites he threw up.  Discussed with family SLP will review prior records to determine if PO diet is possible. Per nursing home recommendation patient had nectar thick liquids only did not see any puree or solids listed for intake     Recommendation: continue POC      CPT code(s) 55484  dysphagia tx  Total minutes :  10 minutes    Becca Le MSCCC/SLP  Speech Language Pathologist  [id number]

## 2024-08-13 NOTE — PROGRESS NOTES
Physical Therapy Treatment Note/Plan of Care    Room #:  0536/0536-02  Patient Name: Aureliano Falcon  YOB: 1955  MRN: [id number]    Date of Service: 8/13/2024     Tentative placement recommendation: Skilled Nursing Facility   Equipment recommendation: Equipment at Nursing Home      Evaluating Physical Therapist: Russ Alejo PT Lic.  #[id number]      Specific Provider Orders/Date/Referring Provider :  08/08/24 1600    PT eval and treat  Start:  08/08/24 1600,   End:  08/08/24 1600,   ONE TIME,   Standing Count:  1 Occurrences,   R       Michael Gallo DO    Admitting Diagnosis:   Dehydration [E86.0]  Hypernatremia [E87.0]  Septicemia (HCC) [A41.9]  Acute cystitis without hematuria [N30.00]  Acute renal failure, unspecified acute renal failure type (HCC) [N17.9]  Altered mental status, unspecified altered mental status type [R41.82]  Pneumonia due to infectious organism, unspecified laterality, unspecified part of lung [J18.9]       Surgery: none  Visit Diagnoses         Codes    Dehydration     E86.0    Acute renal failure, unspecified acute renal failure type (HCC)     N17.9    Acute cystitis without hematuria     N30.00    Pneumonia due to infectious organism, unspecified laterality, unspecified part of lung     J18.9    Septicemia (HCC)     A41.9            Patient Active Problem List   Diagnosis    Altered mental status    Hypernatremia        ASSESSMENT of Current Deficits Patient exhibits decreased strength, balance, and endurance impairing functional mobility, transfers, tolerance to activity, and participation .  These are barriers to d/c and require skilled intervention to address concerns listed above to increase safety and independence at discharge.   Decreased strength, balance and endurance  increases patient's risk for fall.  Severe deconditioning impacting tolerance to activity and ability to participate in rehab and reach maximum level of independence.   Strengthening and endurance training  assessed       Patient is Alert & Oriented x none and does not follow directions    Sensation:  Patient  not appropriate to assess    Edema:  no   Endurance: poor      Vitals: room air   Blood Pressure at rest  Blood Pressure during session    Heart Rate at rest   Heart Rate during session     SPO2 at rest  %  SPO2 during session  %     Patient education  Patient educated on role of Physical Therapy, risks of immobility, safety and plan of care     Patient response to education:   Pt verbalized understanding Pt demonstrated skill Pt requires further education in this area   No No Yes      Treatment:  Patient practiced and was instructed/facilitated in the following treatment: Patient assisted with supine BLE & BUE exercises.   .        Therapeutic Exercises:  ankle pumps, heel slide, hip abduction/adduction, straight leg raise, shoulder elevation, elbow flexion/extension, supination/pronation, and wrist flexion/extension, x 15 reps.      At end of session, patient in bed with alarm call light and phone within reach,  all lines and tubes intact, nursing notified.      Patient would benefit from continued skilled Physical Therapy to improve functional independence and quality of life.         Patient's/ family goals   none stated    Time in 10:20  Time out 10:43    Total Treatment Time  23 minutes        CPT codes:    Therapeutic exercises (60931)   23 minutes  2 unit(s)    Josr Stahl  [facility]  LIC # [id number]

## 2024-08-13 NOTE — ACP (ADVANCE CARE PLANNING)
Advance Care Planning   Healthcare Decision Maker:    Primary Decision Maker: Lashanda Falcon - Legal Guardian, Legal Guardian - [phone]    Click here to complete Healthcare Decision Makers including selection of the Healthcare Decision Maker Relationship (ie \"Primary\").  [facility]- [facility] has legal guardian, only listed agent is Lashanda Falcon. There is NO Secondary agent.          Electronically signed by Heather Kent RN-BC on 8/13/2024 at 7:34 AM

## 2024-08-13 NOTE — PLAN OF CARE
Problem: Discharge Planning  Goal: Discharge to home or other facility with appropriate resources  8/13/2024 0158 by Denise Plunkett RN  Outcome: Progressing  8/12/2024 1824 by Emre Alexander RN  Outcome: Progressing  Flowsheets (Taken 8/10/2024 0800 by Brittni Live RN)  Discharge to home or other facility with appropriate resources:   Identify barriers to discharge with patient and caregiver   Arrange for needed discharge resources and transportation as appropriate     Problem: Skin/Tissue Integrity  Goal: Absence of new skin breakdown  Description: 1.  Monitor for areas of redness and/or skin breakdown  2.  Assess vascular access sites hourly  3.  Every 4-6 hours minimum:  Change oxygen saturation probe site  4.  Every 4-6 hours:  If on nasal continuous positive airway pressure, respiratory therapy assess nares and determine need for appliance change or resting period.  8/13/2024 0158 by Denise Plunkett RN  Outcome: Progressing  8/12/2024 1824 by Emre Alexander RN  Outcome: Progressing  Note: No new skin breakdown noted     Problem: Safety - Adult  Goal: Free from fall injury  8/13/2024 0158 by Denise Plunkett RN  Outcome: Progressing  8/12/2024 1824 by Emre Alexander RN  Outcome: Progressing  Flowsheets (Taken 8/12/2024 1200)  Free From Fall Injury: Instruct family/caregiver on patient safety     Problem: Pain  Goal: Verbalizes/displays adequate comfort level or baseline comfort level  8/13/2024 0158 by Denise Plunkett RN  Outcome: Progressing  8/12/2024 1824 by Emre Alexander RN  Outcome: Progressing  Flowsheets (Taken 8/12/2024 1824)  Verbalizes/displays adequate comfort level or baseline comfort level: Encourage patient to monitor pain and request assistance

## 2024-08-13 NOTE — PROGRESS NOTES
Physician Progress Note      PATIENT:               KAREN MENDOSA  CSN #:                  [id number]  :                       1955  ADMIT DATE:       2024 7:43 AM  DISCH DATE:  RESPONDING  PROVIDER #:        Michael Gallo DO          QUERY TEXT:    Pt admitted with AMS, possible PNA, acute UTI.  Noted documentation of sepsis   per ED PN. If possible, please document in progress notes and discharge   summary:    The medical record reflects the following:  Risk Factors:  acute UTI, possible PNA, hypotension, tachycardia, BREN,  Clinical Indicators: WBC 12.1, BUN 86, Creatinine 3.0, Na 160, Lactic acid   2.0, Procal 0.54, Blood cultures -no growth x 4 days, Tdbsrbkhfc- WBCs   with +2 bacteria and large leukocyte esterase. Urine culture- Gram Positive   Organism. VS on arrival- 115/53, 97.4 axillary, 94, 17, 97%. Rpt VS  @   2314- 89/46, 98.0 axillary, 89, 16, 95%. CXR- Right lower lung   atelectasis/pneumonia. CT abd-constipation. ED PN- Diagnosis and disposition:   Altered mental status, acute renal failure, sepsis, pneumonia, UTI,   hyponatremia.  Requiring admission for further treatment.  Treatment: IV abx, consults, monitoring, IVF, urinalysis, CT, CXR    Thank you,  Ely Chavira RN, CRCR  Clinical Documentation Improvement  [phone]  Options provided:  -- Sepsis confirmed present on admission  -- Sepsis confirmed not present on admission  -- Sepsis ruled out  -- Other - I will add my own diagnosis  -- Disagree - Not applicable / Not valid  -- Disagree - Clinically unable to determine / Unknown  -- Refer to Clinical Documentation Reviewer    PROVIDER RESPONSE TEXT:    The diagnosis of Sepsis was confirmed as present on admission.    Query created by: Ely Chavira on 2024 2:09 PM      Electronically signed by:  Michael Gallo DO 2024 1:16 PM

## 2024-08-13 NOTE — CARE COORDINATION
8/13/2024 PCU, pt with Legal Guardian due to AMS. Pt presented with fever, AMS, hypernatremia. Pt is on Room air, IVF, Ampicillin, Vibramycin, Protonix. LUQ peg, sacral wound- wound care to follow. NO ID following. [facility]- pta. Per Debbi @ [facility], pt will need new auth before he can return to [facility]. ANNI faxed updated chart notes to Wander @ [facility]. Wander to notify  when new [facility] is obtained. CM met with pt in room, contacted, noted and sleeping. Spoke but he continued to sleep. Electronically signed by Heather Kent RN-BC on 8/13/2024 at 2:15 PM

## 2024-08-13 NOTE — PROGRESS NOTES
Department of Internal Medicine        CHIEF COMPLAINT: Altered mental status at nursing home    Reason for Admission: Acute kidney injury, severe hypernatremia, probable UTI    HISTORY OF PRESENT ILLNESS:      The patient is a 69 y.o. male who presents with bead at the nursing home and his baseline is alert and oriented x 2 but nonverbal.  Patient had a change in mental status and was sent in for evaluation.  There was question of having a fever at the nursing home.  Patient temperature was 98.5 in the ED with a heart rate 103 and blood pressure 98/50.  O2 sat 97% on room air at rest.  Serum sodium is 168 with potassium 4.4 with chloride at 130 and CO2 27.  BUN/creatinine 86/3.0 with a lactic acid 2.0.  Random blood sugar 104.  Transaminase normal with a WBC 12.1 and hemoglobin 10.4 with .4.  Urinalysis showed  WBCs with +2 bacteria and large leukocyte esterase.    8/9/2024  Patient seen and examined on PCU.  Patient is about the same.  Case discussed with patient's nurse.  No lab work back yet.  Temperature is 98 with heart rate of 96 and blood pressure 100/50.  O2 sat 98% room air at rest.  Patient to have his tube feedings restarted.    8/10/2024  Patient seen examined on PCU.  Patient is more alert and active today.  Patient trying to talk.  Case discussed with nurses aide at the bedside.  Serum sodium is 153 today with chloride 123 with BUN/creatinine of 59/2.7.  Procalcitonin was 1.3 yesterday and was elevated compared to admission.  Transaminase normal with a WBC of 8.6 and hemoglobin 7.4.  Temperature 99.2 axillary with heart rate 96 blood pressure ranges 92//51.  O2 sat 98% on room air at rest.  Urinary output is good.  Patient was started on his tube feedings yesterday.  Nursing states patient is tolerating tube feedings which has 0 to a residual    8/11/2024  Patient seen examined on PCU.  Patient is a little bit more alert again today.  Patient serum sodium is 150 with BUN/creatinine  51/2.4.  Transaminase normal with a WBC 7.7 hemoglobin 8.2.  Temperature was 98.4 with heart rate 91 blood pressure 127/58.  O2 sat 99% on room air at rest.  Urine output still fairly good ranges 600-750 cc this shift.  Repeat urinalysis shows improvement.  Continue IV fluids    8/12/2024  Patient seen examined on PCU.  Patient is a lot more alert today and more active in the upper extremities.  Patient does not communicate very well.  Tube feedings residual seems to be fairly good.  Serum sodium is 145 with BUN/creatinine 41/2.0.  Transaminase normal with a WBC 7.6 and hemoglobin 8.9.  Temperature is 98.3 with heart rate 88 blood pressure 150/70.  O2 sat 93% on room air at rest.  Urine output ranges 400-2100 cc a shift.  Change IV fluids to half-normal saline with 20 KCl at 75 cc an hour.    8/13/2024  Patient seen examined on PCU.  Patient is more alert but his psych medicines was just restarted yesterday.  BUN/creatinine of 37/1.8 with normal transaminase.  Blood sugars range 119-123.  WBC 7.5 hemoglobin 8.4.  Temperature is 98.2 with heart rate 95 and blood pressure ranges 88//68.  O2 sat 100% on room air at rest.  Urine output ranges 800-1000 cc a shift.  Patient's serum creatinine is nearing its baseline which is about 1.3.    Past Medical History:    Past Medical History:   Diagnosis Date    Smoker 01/08/2024    4 cartons per month     Past Surgical History:    Past Surgical History:   Procedure Laterality Date    UPPER GASTROINTESTINAL ENDOSCOPY N/A 1/19/2024    EGD BIOPSY performed by Temo Barnett MD at [facility] ENDOSCOPY       Medications Prior to Admission:    @  Prior to Admission medications    Medication Sig Start Date End Date Taking? Authorizing Provider   Cranberry 500 MG CAPS Take 1 capsule by mouth every morning    ProviderLeonardo MD   acetaminophen (TYLENOL) 325 MG tablet Take 2 tablets by mouth every 4 hours as needed for Pain or Fever    ProviderLeonardo MD   magnesium

## 2024-08-14 LAB
ALBUMIN SERPL-MCNC: 2.3 G/DL (ref 3.5–5.2)
ALP SERPL-CCNC: 138 U/L (ref 40–129)
ALT SERPL-CCNC: 14 U/L (ref 0–40)
ANION GAP SERPL CALCULATED.3IONS-SCNC: 8 MMOL/L (ref 7–16)
AST SERPL-CCNC: 26 U/L (ref 0–39)
BASOPHILS # BLD: 0.01 K/UL (ref 0–0.2)
BASOPHILS NFR BLD: 0 % (ref 0–2)
BILIRUB SERPL-MCNC: <0.2 MG/DL (ref 0–1.2)
BUN SERPL-MCNC: 33 MG/DL (ref 6–23)
CALCIUM SERPL-MCNC: 8.7 MG/DL (ref 8.6–10.2)
CHLORIDE SERPL-SCNC: 110 MMOL/L (ref 98–107)
CO2 SERPL-SCNC: 25 MMOL/L (ref 22–29)
CREAT SERPL-MCNC: 1.8 MG/DL (ref 0.7–1.2)
EOSINOPHIL # BLD: 0.04 K/UL (ref 0.05–0.5)
EOSINOPHILS RELATIVE PERCENT: 1 % (ref 0–6)
ERYTHROCYTE [DISTWIDTH] IN BLOOD BY AUTOMATED COUNT: 15.9 % (ref 11.5–15)
GFR, ESTIMATED: 42 ML/MIN/1.73M2
GLUCOSE SERPL-MCNC: 116 MG/DL (ref 74–99)
HCT VFR BLD AUTO: 27.1 % (ref 37–54)
HGB BLD-MCNC: 8.3 G/DL (ref 12.5–16.5)
IMM GRANULOCYTES # BLD AUTO: 0.08 K/UL (ref 0–0.58)
IMM GRANULOCYTES NFR BLD: 1 % (ref 0–5)
LYMPHOCYTES NFR BLD: 1.48 K/UL (ref 1.5–4)
LYMPHOCYTES RELATIVE PERCENT: 22 % (ref 20–42)
MCH RBC QN AUTO: 28.6 PG (ref 26–35)
MCHC RBC AUTO-ENTMCNC: 30.6 G/DL (ref 32–34.5)
MCV RBC AUTO: 93.4 FL (ref 80–99.9)
MONOCYTES NFR BLD: 0.36 K/UL (ref 0.1–0.95)
MONOCYTES NFR BLD: 5 % (ref 2–12)
NEUTROPHILS NFR BLD: 70 % (ref 43–80)
NEUTS SEG NFR BLD: 4.68 K/UL (ref 1.8–7.3)
PLATELET # BLD AUTO: 201 K/UL (ref 130–450)
PMV BLD AUTO: 12.6 FL (ref 7–12)
POTASSIUM SERPL-SCNC: 4.7 MMOL/L (ref 3.5–5)
PROT SERPL-MCNC: 6.3 G/DL (ref 6.4–8.3)
RBC # BLD AUTO: 2.9 M/UL (ref 3.8–5.8)
SODIUM SERPL-SCNC: 143 MMOL/L (ref 132–146)
WBC OTHER # BLD: 6.7 K/UL (ref 4.5–11.5)

## 2024-08-14 PROCEDURE — 6370000000 HC RX 637 (ALT 250 FOR IP): Performed by: INTERNAL MEDICINE

## 2024-08-14 PROCEDURE — 2500000003 HC RX 250 WO HCPCS: Performed by: INTERNAL MEDICINE

## 2024-08-14 PROCEDURE — 76937 US GUIDE VASCULAR ACCESS: CPT

## 2024-08-14 PROCEDURE — 6360000002 HC RX W HCPCS: Performed by: INTERNAL MEDICINE

## 2024-08-14 PROCEDURE — 2580000003 HC RX 258: Performed by: INTERNAL MEDICINE

## 2024-08-14 PROCEDURE — 36415 COLL VENOUS BLD VENIPUNCTURE: CPT

## 2024-08-14 PROCEDURE — 80053 COMPREHEN METABOLIC PANEL: CPT

## 2024-08-14 PROCEDURE — 1200000000 HC SEMI PRIVATE

## 2024-08-14 PROCEDURE — 85025 COMPLETE CBC W/AUTO DIFF WBC: CPT

## 2024-08-14 RX ADMIN — BENZTROPINE MESYLATE 2 MG: 1 TABLET ORAL at 20:09

## 2024-08-14 RX ADMIN — AMPICILLIN SODIUM 2000 MG: 2 INJECTION, POWDER, FOR SOLUTION INTRAMUSCULAR; INTRAVENOUS at 02:53

## 2024-08-14 RX ADMIN — CLONAZEPAM 0.25 MG: 0.5 TABLET ORAL at 15:55

## 2024-08-14 RX ADMIN — AMPICILLIN SODIUM 2000 MG: 2 INJECTION, POWDER, FOR SOLUTION INTRAMUSCULAR; INTRAVENOUS at 15:55

## 2024-08-14 RX ADMIN — TAMSULOSIN HYDROCHLORIDE 0.4 MG: 0.4 CAPSULE ORAL at 10:41

## 2024-08-14 RX ADMIN — POTASSIUM CHLORIDE AND SODIUM CHLORIDE: 450; 150 INJECTION, SOLUTION INTRAVENOUS at 13:49

## 2024-08-14 RX ADMIN — HEPARIN SODIUM 5000 UNITS: 5000 INJECTION INTRAVENOUS; SUBCUTANEOUS at 10:41

## 2024-08-14 RX ADMIN — DOXYCYCLINE 100 MG: 100 INJECTION, POWDER, LYOPHILIZED, FOR SOLUTION INTRAVENOUS at 23:27

## 2024-08-14 RX ADMIN — BENZTROPINE MESYLATE 2 MG: 1 TABLET ORAL at 10:42

## 2024-08-14 RX ADMIN — CLONAZEPAM 0.5 MG: 0.5 TABLET ORAL at 10:41

## 2024-08-14 RX ADMIN — CLONAZEPAM 1.5 MG: 1 TABLET ORAL at 20:10

## 2024-08-14 RX ADMIN — PANTOPRAZOLE SODIUM 40 MG: 40 INJECTION, POWDER, FOR SOLUTION INTRAVENOUS at 13:39

## 2024-08-14 RX ADMIN — CLOZAPINE 200 MG: 100 TABLET ORAL at 20:09

## 2024-08-14 RX ADMIN — CLOZAPINE 100 MG: 100 TABLET ORAL at 10:42

## 2024-08-14 RX ADMIN — HEPARIN SODIUM 5000 UNITS: 5000 INJECTION INTRAVENOUS; SUBCUTANEOUS at 20:09

## 2024-08-14 RX ADMIN — DOXYCYCLINE 100 MG: 100 INJECTION, POWDER, LYOPHILIZED, FOR SOLUTION INTRAVENOUS at 13:55

## 2024-08-14 ASSESSMENT — PAIN SCALES - PAIN ASSESSMENT IN ADVANCED DEMENTIA (PAINAD)
CONSOLABILITY: NO NEED TO CONSOLE
BODYLANGUAGE: RELAXED
BREATHING: NORMAL
FACIALEXPRESSION: SMILING OR INEXPRESSIVE
TOTALSCORE: 0

## 2024-08-14 ASSESSMENT — PAIN SCALES - GENERAL: PAINLEVEL_OUTOF10: 0

## 2024-08-14 NOTE — PLAN OF CARE
Problem: Discharge Planning  Goal: Discharge to home or other facility with appropriate resources  Outcome: HH/HSPC Not Progressing     Problem: Skin/Tissue Integrity  Goal: Absence of new skin breakdown  Description: 1.  Monitor for areas of redness and/or skin breakdown  2.  Assess vascular access sites hourly  3.  Every 4-6 hours minimum:  Change oxygen saturation probe site  4.  Every 4-6 hours:  If on nasal continuous positive airway pressure, respiratory therapy assess nares and determine need for appliance change or resting period.  Outcome: HH/HSPC Not Progressing     Problem: Safety - Adult  Goal: Free from fall injury  Outcome: HH/HSPC Not Progressing     Problem: Pain  Goal: Verbalizes/displays adequate comfort level or baseline comfort level  Outcome: HH/HSPC Not Progressing     Problem: Nutrition Deficit:  Goal: Optimize nutritional status  Outcome: HH/HSPC Not Progressing

## 2024-08-14 NOTE — CARE COORDINATION
8/15/2024 Potential for Discharge: Otis Valenzuela- alyssa Viramontes- who is off Thursday- out to Wen or Brannon at SNF main number, [facility] when medically stable. CM will update Earnestine- to inform [facility] DON for potential discharge/return to SNF. Nursing to call N-N and arrange transportation when/if needed. NEEDS OBEY SIGNED. Electronically signed by Heather Kent RN-BC on 8/15/2024 at 9:52 AM

## 2024-08-14 NOTE — PLAN OF CARE
Problem: Discharge Planning  Goal: Discharge to home or other facility with appropriate resources  Outcome: Progressing     Problem: Skin/Tissue Integrity  Goal: Absence of new skin breakdown  Description: 1.  Monitor for areas of redness and/or skin breakdown  2.  Assess vascular access sites hourly  3.  Every 4-6 hours minimum:  Change oxygen saturation probe site  4.  Every 4-6 hours:  If on nasal continuous positive airway pressure, respiratory therapy assess nares and determine need for appliance change or resting period.  Outcome: Progressing     Problem: Safety - Adult  Goal: Free from fall injury  Outcome: Progressing     Problem: Pain  Goal: Verbalizes/displays adequate comfort level or baseline comfort level  Outcome: Progressing     Problem: Nutrition Deficit:  Goal: Optimize nutritional status  8/13/2024 2301 by Flaco Alvarez RN  Outcome: Progressing  8/13/2024 1353 by Andra Krishnan, YESENIA, LD  Outcome: Progressing  Flowsheets (Taken 8/13/2024 1346)  Nutrient intake appropriate for improving, restoring, or maintaining nutritional needs:   Assess nutritional status and recommend course of action   Monitor oral intake, labs, and treatment plans   Recommend appropriate diets, oral nutritional supplements, and vitamin/mineral supplements   Recommend, monitor, and adjust tube feedings and TPN/PPN based on assessed needs

## 2024-08-14 NOTE — DISCHARGE INSTR - COC
Continuity of Care Form    Patient Name: Aureliano Falcon   :  1955  MRN:  [id number]    Admit date:  2024  Discharge date:  8-15-24    Code Status Order: Full Code   Advance Directives:   Advance Care Flowsheet Documentation             Admitting Physician:  Michael Gallo DO  PCP: Toshia Perea APRN - CNP    Discharging Nurse: LEONIDAS  Discharging Hospital Unit/Room#: 0536/0536-02  Discharging Unit Phone Number: [phone]    Emergency Contact:   Extended Emergency Contact Information  Primary Emergency Contact: Lashanda Falcon  Home Phone: [phone]  Relation: Legal Guardian  Preferred language: Kinyarwanda   needed? Yes  Secondary Emergency Contact: Tobi Falcon  Home Phone: [phone]  Relation: Brother/Sister   needed? No    Past Surgical History:  Past Surgical History:   Procedure Laterality Date    UPPER GASTROINTESTINAL ENDOSCOPY N/A 2024    EGD BIOPSY performed by Temo Barnett MD at [facility] ENDOSCOPY       Immunization History:     There is no immunization history on file for this patient.    Active Problems:  Patient Active Problem List   Diagnosis Code    Altered mental status R41.82    Hypernatremia E87.0       Isolation/Infection:   Isolation            No Isolation          Patient Infection Status       Infection Onset Added Last Indicated Last Indicated By Review Planned Expiration Resolved Resolved By    None active    Resolved    Influenza 24 Influenza A+B, PCR   24 Infection                        Nurse Assessment:  Last Vital Signs: /60   Pulse 96   Temp 97.5 °F (36.4 °C) (Oral)   Resp 27   Ht 1.626 m (5' 4.02\")   Wt 48.1 kg (106 lb)   SpO2 91%   BMI 18.19 kg/m²     Last documented pain score (0-10 scale): Pain Level: 0  Last Weight:   Wt Readings from Last 1 Encounters:   24 48.1 kg (106 lb)     Mental Status:  oriented, alert, and coherent    IV Access:  - None    Nursing Mobility/ADLs:  Walking

## 2024-08-14 NOTE — PROGRESS NOTES
Department of Internal Medicine        CHIEF COMPLAINT: Altered mental status at nursing home    Reason for Admission: Acute kidney injury, severe hypernatremia, probable UTI    HISTORY OF PRESENT ILLNESS:      The patient is a 69 y.o. male who presents with bead at the nursing home and his baseline is alert and oriented x 2 but nonverbal.  Patient had a change in mental status and was sent in for evaluation.  There was question of having a fever at the nursing home.  Patient temperature was 98.5 in the ED with a heart rate 103 and blood pressure 98/50.  O2 sat 97% on room air at rest.  Serum sodium is 168 with potassium 4.4 with chloride at 130 and CO2 27.  BUN/creatinine 86/3.0 with a lactic acid 2.0.  Random blood sugar 104.  Transaminase normal with a WBC 12.1 and hemoglobin 10.4 with .4.  Urinalysis showed  WBCs with +2 bacteria and large leukocyte esterase.    8/9/2024  Patient seen and examined on PCU.  Patient is about the same.  Case discussed with patient's nurse.  No lab work back yet.  Temperature is 98 with heart rate of 96 and blood pressure 100/50.  O2 sat 98% room air at rest.  Patient to have his tube feedings restarted.    8/10/2024  Patient seen examined on PCU.  Patient is more alert and active today.  Patient trying to talk.  Case discussed with nurses aide at the bedside.  Serum sodium is 153 today with chloride 123 with BUN/creatinine of 59/2.7.  Procalcitonin was 1.3 yesterday and was elevated compared to admission.  Transaminase normal with a WBC of 8.6 and hemoglobin 7.4.  Temperature 99.2 axillary with heart rate 96 blood pressure ranges 92//51.  O2 sat 98% on room air at rest.  Urinary output is good.  Patient was started on his tube feedings yesterday.  Nursing states patient is tolerating tube feedings which has 0 to a residual    8/11/2024  Patient seen examined on PCU.  Patient is a little bit more alert again today.  Patient serum sodium is 150 with BUN/creatinine  08/13/2024 08:03 AM    MCHC 30.0 08/13/2024 08:03 AM    RDW 15.5 08/13/2024 08:03 AM    LYMPHOPCT 19 08/13/2024 08:03 AM    MONOPCT 5 08/13/2024 08:03 AM    EOSPCT 1 08/13/2024 08:03 AM    BASOPCT 0 08/13/2024 08:03 AM    MONOSABS 0.39 08/13/2024 08:03 AM    LYMPHSABS 1.39 08/13/2024 08:03 AM    EOSABS 0.04 08/13/2024 08:03 AM    BASOSABS 0.02 08/13/2024 08:03 AM     CMP:    Lab Results   Component Value Date/Time     08/13/2024 08:03 AM    K 4.5 08/13/2024 08:03 AM     08/13/2024 08:03 AM    CO2 24 08/13/2024 08:03 AM    BUN 37 08/13/2024 08:03 AM    CREATININE 1.8 08/13/2024 08:03 AM    LABGLOM 41 08/13/2024 08:03 AM    LABGLOM >60 01/29/2024 05:44 AM    GLUCOSE 119 08/13/2024 08:03 AM    CALCIUM 8.8 08/13/2024 08:03 AM    BILITOT <0.2 08/13/2024 08:03 AM    ALKPHOS 133 08/13/2024 08:03 AM    AST 18 08/13/2024 08:03 AM    ALT 13 08/13/2024 08:03 AM     Magnesium:    Lab Results   Component Value Date/Time    MG 2.4 08/09/2024 02:10 PM     Phosphorus:    Lab Results   Component Value Date/Time    PHOS 2.4 08/09/2024 02:10 PM     PT/INR:    Lab Results   Component Value Date/Time    PROTIME 13.9 08/09/2024 11:46 PM    INR 1.3 08/09/2024 11:46 PM     Troponin:  No results found for: \"TROPONINI\"  U/A:    Lab Results   Component Value Date/Time    COLORU Yellow 08/11/2024 02:42 AM    PROTEINU 30 08/11/2024 02:42 AM    PHUR 6.0 08/11/2024 02:42 AM    PHUR 5.5 01/13/2024 06:00 PM    WBCUA 6 TO 9 08/11/2024 02:42 AM    RBCUA 3 to 5 08/11/2024 02:42 AM    BACTERIA 2+ 08/08/2024 08:33 AM    LEUKOCYTESUR SMALL 08/11/2024 02:42 AM    UROBILINOGEN 0.2 08/11/2024 02:42 AM    BILIRUBINUR NEGATIVE 08/11/2024 02:42 AM    GLUCOSEU 100 08/11/2024 02:42 AM     ABG:    Lab Results   Component Value Date/Time    PH 7.484 01/08/2024 02:12 AM    PCO2 29.6 01/08/2024 02:12 AM    PO2 67.1 01/08/2024 02:12 AM    HCO3 21.7 01/08/2024 02:12 AM    BE -0.5 01/08/2024 02:12 AM    O2SAT 94.5 01/08/2024 02:12 AM     HgBA1c:    Lab

## 2024-08-15 VITALS
TEMPERATURE: 96.8 F | OXYGEN SATURATION: 100 % | RESPIRATION RATE: 19 BRPM | BODY MASS INDEX: 18.1 KG/M2 | WEIGHT: 106 LBS | DIASTOLIC BLOOD PRESSURE: 55 MMHG | HEART RATE: 89 BPM | HEIGHT: 64 IN | SYSTOLIC BLOOD PRESSURE: 140 MMHG

## 2024-08-15 LAB
ALBUMIN SERPL-MCNC: 2.4 G/DL (ref 3.5–5.2)
ALP SERPL-CCNC: 129 U/L (ref 40–129)
ALT SERPL-CCNC: 15 U/L (ref 0–40)
ANION GAP SERPL CALCULATED.3IONS-SCNC: 8 MMOL/L (ref 7–16)
AST SERPL-CCNC: 25 U/L (ref 0–39)
BASOPHILS # BLD: 0.06 K/UL (ref 0–0.2)
BASOPHILS NFR BLD: 1 % (ref 0–2)
BILIRUB SERPL-MCNC: 0.2 MG/DL (ref 0–1.2)
BUN SERPL-MCNC: 39 MG/DL (ref 6–23)
CALCIUM SERPL-MCNC: 8.8 MG/DL (ref 8.6–10.2)
CHLORIDE SERPL-SCNC: 106 MMOL/L (ref 98–107)
CO2 SERPL-SCNC: 26 MMOL/L (ref 22–29)
CREAT SERPL-MCNC: 1.7 MG/DL (ref 0.7–1.2)
EOSINOPHIL # BLD: 0 K/UL (ref 0.05–0.5)
EOSINOPHILS RELATIVE PERCENT: 0 % (ref 0–6)
ERYTHROCYTE [DISTWIDTH] IN BLOOD BY AUTOMATED COUNT: 16.2 % (ref 11.5–15)
GFR, ESTIMATED: 42 ML/MIN/1.73M2
GLUCOSE SERPL-MCNC: 97 MG/DL (ref 74–99)
HCT VFR BLD AUTO: 25.3 % (ref 37–54)
HGB BLD-MCNC: 7.8 G/DL (ref 12.5–16.5)
LYMPHOCYTES NFR BLD: 2.11 K/UL (ref 1.5–4)
LYMPHOCYTES RELATIVE PERCENT: 33 % (ref 20–42)
MCH RBC QN AUTO: 28.5 PG (ref 26–35)
MCHC RBC AUTO-ENTMCNC: 30.8 G/DL (ref 32–34.5)
MCV RBC AUTO: 92.3 FL (ref 80–99.9)
METAMYELOCYTES ABSOLUTE COUNT: 0.06 K/UL (ref 0–0.12)
METAMYELOCYTES: 1 % (ref 0–1)
MONOCYTES NFR BLD: 0.45 K/UL (ref 0.1–0.95)
MONOCYTES NFR BLD: 7 % (ref 2–12)
MYELOCYTES ABSOLUTE COUNT: 0.06 K/UL
MYELOCYTES: 1 %
NEUTROPHILS NFR BLD: 57 % (ref 43–80)
NEUTS SEG NFR BLD: 3.65 K/UL (ref 1.8–7.3)
PLATELET # BLD AUTO: 253 K/UL (ref 130–450)
PLATELET ESTIMATE: ABNORMAL
PMV BLD AUTO: 11.8 FL (ref 7–12)
POTASSIUM SERPL-SCNC: 4.6 MMOL/L (ref 3.5–5)
PROT SERPL-MCNC: 6.2 G/DL (ref 6.4–8.3)
RBC # BLD AUTO: 2.74 M/UL (ref 3.8–5.8)
SODIUM SERPL-SCNC: 140 MMOL/L (ref 132–146)
WBC OTHER # BLD: 6.4 K/UL (ref 4.5–11.5)

## 2024-08-15 PROCEDURE — 85025 COMPLETE CBC W/AUTO DIFF WBC: CPT

## 2024-08-15 PROCEDURE — 6360000002 HC RX W HCPCS: Performed by: INTERNAL MEDICINE

## 2024-08-15 PROCEDURE — 80053 COMPREHEN METABOLIC PANEL: CPT

## 2024-08-15 PROCEDURE — 2500000003 HC RX 250 WO HCPCS: Performed by: INTERNAL MEDICINE

## 2024-08-15 PROCEDURE — 2580000003 HC RX 258: Performed by: INTERNAL MEDICINE

## 2024-08-15 PROCEDURE — 6370000000 HC RX 637 (ALT 250 FOR IP): Performed by: INTERNAL MEDICINE

## 2024-08-15 PROCEDURE — 36415 COLL VENOUS BLD VENIPUNCTURE: CPT

## 2024-08-15 RX ORDER — AMPICILLIN 500 MG/1
500 CAPSULE ORAL 2 TIMES DAILY
DISCHARGE
Start: 2024-08-15

## 2024-08-15 RX ORDER — LEVOFLOXACIN 250 MG/1
250 TABLET, FILM COATED ORAL DAILY
DISCHARGE
Start: 2024-08-15 | End: 2024-08-22

## 2024-08-15 RX ORDER — AMPICILLIN 500 MG/1
500 CAPSULE ORAL 4 TIMES DAILY
DISCHARGE
Start: 2024-08-15 | End: 2024-08-15

## 2024-08-15 RX ADMIN — BENZTROPINE MESYLATE 2 MG: 1 TABLET ORAL at 10:24

## 2024-08-15 RX ADMIN — PANTOPRAZOLE SODIUM 40 MG: 40 INJECTION, POWDER, FOR SOLUTION INTRAVENOUS at 10:17

## 2024-08-15 RX ADMIN — AMPICILLIN SODIUM 2000 MG: 2 INJECTION, POWDER, FOR SOLUTION INTRAMUSCULAR; INTRAVENOUS at 01:42

## 2024-08-15 RX ADMIN — HEPARIN SODIUM 5000 UNITS: 5000 INJECTION INTRAVENOUS; SUBCUTANEOUS at 10:17

## 2024-08-15 RX ADMIN — CLONAZEPAM 0.5 MG: 0.5 TABLET ORAL at 10:16

## 2024-08-15 RX ADMIN — POTASSIUM CHLORIDE AND SODIUM CHLORIDE: 450; 150 INJECTION, SOLUTION INTRAVENOUS at 02:16

## 2024-08-15 RX ADMIN — TAMSULOSIN HYDROCHLORIDE 0.4 MG: 0.4 CAPSULE ORAL at 10:17

## 2024-08-15 RX ADMIN — DOXYCYCLINE 100 MG: 100 INJECTION, POWDER, LYOPHILIZED, FOR SOLUTION INTRAVENOUS at 12:23

## 2024-08-15 RX ADMIN — CLOZAPINE 100 MG: 100 TABLET ORAL at 10:24

## 2024-08-15 NOTE — CARE COORDINATION
8/15/2024 Potential for Discharge: Otis Valenzuela- alyssa Viramontes- who is off Thursday- out to [facility] or [facility] at SNF main [facility], [facility]. CM will update Earnestine- to inform [facility] DON for potential discharge/return to SNF. Nursing to call [facility] and arrange transportation when/if needed. Electronically signed by Heather Kent RN-BC on 8/15/2024 at 9:41 AM

## 2024-08-15 NOTE — FLOWSHEET NOTE
Inpatient Wound Care    Admit Date: 8/8/2024  7:43 AM    Reason for consult:  sacral    Significant history:    Past Medical History:   Diagnosis Date    Smoker 01/08/2024    4 cartons per month       Wound history:      Findings:     08/15/24 1305   Wound 08/08/24 Sacrum aprox 2cm X 2cm scabbed area on coccyx surrounding area non blanchable   Date First Assessed/Time First Assessed: 08/08/24 1700   Present on Original Admission: Yes  Primary Wound Type: Pressure Injury  Location: Sacrum  Wound Description (Comments): aprox 2cm X 2cm scabbed area on coccyx surrounding area non blanchable   Wound Image     Wound Cleansed Cleansed with saline   Dressing/Treatment Alginate;Foam   Wound Length (cm) 2 cm   Wound Width (cm) 2 cm   Wound Surface Area (cm^2) 4 cm^2   Wound Assessment Slough   Drainage Amount Scant (moist but unmeasurable)   Drainage Description Serosanguinous   Odor None   Maria Eugenia-wound Assessment   (purplish red)       **Informed Consent**    The patient has given verbal consent to have photos taken of sacral and inserted into their chart as part of their permanent medical record for purposes of documentation, treatment management and/or medical review.   All Images taken on 8/15/24 of patient name: Aureliano Falcon were transmitted and stored on secured Epic  Site located within Media Folder Tab by a registered Epic-Ha[employer] Mobile Application Device.       Impression:  unstageabale wound    Interventions in place:  aquacell and mepilex dressing    Plan:  Cont with aquacell and mepilex dressing every other day  Low air loss bed      Ladonna Roldan RN 8/15/2024 1:07 PM

## 2024-08-15 NOTE — DISCHARGE INSTRUCTIONS
Your information:  Name: Aureliano Falcon  : 1955    Your instructions:    YOU ARE BEING DISCHARGED HOME. PLEASE MAKE AND KEEP ALL FOLLOW-UP APPOINTMENTS. IF YOU EXPERIENCE CHEST PAIN, SHORTNESS OF BREATH, SWEATING, LIGHTHEADEDNESS, OR PALPITATIONS: CALL 911 OR GO TO THE EMERGENCY DEPARTMENT IMMEDIATELY. If you begin to feel unwell or symptoms persist, contact your physician.    What to do after you leave the hospital:    Recommended diet:  Low sodium diet    Recommended activity: activity as tolerated        The following personal items were collected during your admission and were returned to you:    Belongings  Dental Appliances: None  Vision - Corrective Lenses: None  Hearing Aid: None  Clothing: Other (Comment) (none)  Jewelry: None  Body Piercings Removed: Yes  Electronic Devices: None  Weapons (Notify Protective Services/Security): None  Other Valuables: Money  Home Medications: None  Valuables Given To: Other (Comment)  Provide Name(s) of Who Valuable(s) Were Given To: na

## 2024-08-15 NOTE — CARE COORDINATION
8/15/24 1101 CM note: covid (-) 8/8/24, urine cx (+) 8/10/24. Room air. Discharge order noted. Po levaquin. [facility] return to [facility]. [facility]. OBEY is signed. Arranged ambulance transport with [facility] for  at 1:30. Ambulance form on soft chart. Notified pts legal guardian Lashanda (using [place] interrupter pad), pts RN Robyn, charge nurse Rachel, and Earnestine HICKMAN rep of  time. Electronically signed by Genevieve Hannon RN on 8/15/2024 at 11:21 AM

## 2024-08-15 NOTE — DISCHARGE SUMMARY
Department of Internal Medicine        CHIEF COMPLAINT: Altered mental status at nursing home    Reason for Admission: Acute kidney injury, severe hypernatremia, probable UTI    HISTORY OF PRESENT ILLNESS:      The patient is a 69 y.o. male who presents with bead at the nursing home and his baseline is alert and oriented x 2 but nonverbal.  Patient had a change in mental status and was sent in for evaluation.  There was question of having a fever at the nursing home.  Patient temperature was 98.5 in the ED with a heart rate 103 and blood pressure 98/50.  O2 sat 97% on room air at rest.  Serum sodium is 168 with potassium 4.4 with chloride at 130 and CO2 27.  BUN/creatinine 86/3.0 with a lactic acid 2.0.  Random blood sugar 104.  Transaminase normal with a WBC 12.1 and hemoglobin 10.4 with .4.  Urinalysis showed  WBCs with +2 bacteria and large leukocyte esterase.    8/9/2024  Patient seen and examined on PCU.  Patient is about the same.  Case discussed with patient's nurse.  No lab work back yet.  Temperature is 98 with heart rate of 96 and blood pressure 100/50.  O2 sat 98% room air at rest.  Patient to have his tube feedings restarted.    8/10/2024  Patient seen examined on PCU.  Patient is more alert and active today.  Patient trying to talk.  Case discussed with nurses aide at the bedside.  Serum sodium is 153 today with chloride 123 with BUN/creatinine of 59/2.7.  Procalcitonin was 1.3 yesterday and was elevated compared to admission.  Transaminase normal with a WBC of 8.6 and hemoglobin 7.4.  Temperature 99.2 axillary with heart rate 96 blood pressure ranges 92//51.  O2 sat 98% on room air at rest.  Urinary output is good.  Patient was started on his tube feedings yesterday.  Nursing states patient is tolerating tube feedings which has 0 to a residual    8/11/2024  Patient seen examined on PCU.  Patient is a little bit more alert again today.  Patient serum sodium is 150 with BUN/creatinine  pulses intact bilaterally  Neuro:  Cranial nerves II-XII grossly intact; motor and sensory function intact with no focal deficits  Skin:  No rashes, lesions or wounds    DATA:  CBC with Differential:    Lab Results   Component Value Date/Time    WBC 6.4 08/15/2024 04:35 AM    RBC 2.74 08/15/2024 04:35 AM    HGB 7.8 08/15/2024 04:35 AM    HCT 25.3 08/15/2024 04:35 AM     08/15/2024 04:35 AM    MCV 92.3 08/15/2024 04:35 AM    MCH 28.5 08/15/2024 04:35 AM    MCHC 30.8 08/15/2024 04:35 AM    RDW 16.2 08/15/2024 04:35 AM    METASPCT 1 08/15/2024 04:35 AM    LYMPHOPCT 33 08/15/2024 04:35 AM    MONOPCT 7 08/15/2024 04:35 AM    MYELOPCT 1 08/15/2024 04:35 AM    EOSPCT 0 08/15/2024 04:35 AM    BASOPCT 1 08/15/2024 04:35 AM    MONOSABS 0.45 08/15/2024 04:35 AM    LYMPHSABS 2.11 08/15/2024 04:35 AM    EOSABS 0.00 08/15/2024 04:35 AM    BASOSABS 0.06 08/15/2024 04:35 AM     CMP:    Lab Results   Component Value Date/Time     08/15/2024 04:35 AM    K 4.6 08/15/2024 04:35 AM     08/15/2024 04:35 AM    CO2 26 08/15/2024 04:35 AM    BUN 39 08/15/2024 04:35 AM    CREATININE 1.7 08/15/2024 04:35 AM    LABGLOM 42 08/15/2024 04:35 AM    LABGLOM >60 01/29/2024 05:44 AM    GLUCOSE 97 08/15/2024 04:35 AM    CALCIUM 8.8 08/15/2024 04:35 AM    BILITOT 0.2 08/15/2024 04:35 AM    ALKPHOS 129 08/15/2024 04:35 AM    AST 25 08/15/2024 04:35 AM    ALT 15 08/15/2024 04:35 AM     Magnesium:    Lab Results   Component Value Date/Time    MG 2.4 08/09/2024 02:10 PM     Phosphorus:    Lab Results   Component Value Date/Time    PHOS 2.4 08/09/2024 02:10 PM     PT/INR:    Lab Results   Component Value Date/Time    PROTIME 13.9 08/09/2024 11:46 PM    INR 1.3 08/09/2024 11:46 PM     Troponin:  No results found for: \"TROPONINI\"  U/A:    Lab Results   Component Value Date/Time    COLORU Yellow 08/11/2024 02:42 AM    PROTEINU 30 08/11/2024 02:42 AM    PHUR 6.0 08/11/2024 02:42 AM    PHUR 5.5 01/13/2024 06:00 PM    WBCUA 6 TO 9 08/11/2024

## 2024-08-15 NOTE — PLAN OF CARE
Problem: Discharge Planning  Goal: Discharge to home or other facility with appropriate resources  8/15/2024 0156 by Lopez Martin, RN  Outcome: Progressing     Problem: Skin/Tissue Integrity  Goal: Absence of new skin breakdown  Description: 1.  Monitor for areas of redness and/or skin breakdown  2.  Assess vascular access sites hourly  3.  Every 4-6 hours minimum:  Change oxygen saturation probe site  4.  Every 4-6 hours:  If on nasal continuous positive airway pressure, respiratory therapy assess nares and determine need for appliance change or resting period.  8/15/2024 0156 by Lopez Martin, RN  Outcome: Progressing     Problem: Safety - Adult  Goal: Free from fall injury  8/15/2024 0156 by Lopez Martin, RN  Outcome: Progressing     Problem: Pain  Goal: Verbalizes/displays adequate comfort level or baseline comfort level  8/15/2024 0156 by Lopez Martin, RN  Outcome: Progressing     Problem: Nutrition Deficit:  Goal: Optimize nutritional status  8/15/2024 0156 by Lopez Martin, RN  Outcome: Progressing

## 2024-08-16 LAB
MICROORGANISM SPEC CULT: NORMAL
MICROORGANISM SPEC CULT: NORMAL
SERVICE CMNT-IMP: NORMAL
SERVICE CMNT-IMP: NORMAL
SPECIMEN DESCRIPTION: NORMAL
SPECIMEN DESCRIPTION: NORMAL

## 2024-08-20 ENCOUNTER — OUTSIDE SERVICES (OUTPATIENT)
Dept: INTERNAL MEDICINE CLINIC | Age: 69
End: 2024-08-20
Payer: MEDICARE

## 2024-08-20 DIAGNOSIS — R41.82 ALTERED MENTAL STATUS, UNSPECIFIED ALTERED MENTAL STATUS TYPE: ICD-10-CM

## 2024-08-20 DIAGNOSIS — N17.8 OTHER ACUTE KIDNEY FAILURE (HCC): ICD-10-CM

## 2024-08-20 DIAGNOSIS — N13.9 OBSTRUCTIVE AND REFLUX UROPATHY, UNSPECIFIED: ICD-10-CM

## 2024-08-20 DIAGNOSIS — F20.0 PARANOID SCHIZOPHRENIA (HCC): Primary | ICD-10-CM

## 2024-08-20 DIAGNOSIS — E11.9 TYPE 2 DIABETES MELLITUS WITHOUT COMPLICATION, UNSPECIFIED WHETHER LONG TERM INSULIN USE (HCC): ICD-10-CM

## 2024-08-20 DIAGNOSIS — R13.10 DYSPHAGIA, UNSPECIFIED TYPE: ICD-10-CM

## 2024-08-20 DIAGNOSIS — G93.41 METABOLIC ENCEPHALOPATHY: ICD-10-CM

## 2024-08-20 DIAGNOSIS — E87.6 HYPOKALEMIA: ICD-10-CM

## 2024-08-20 DIAGNOSIS — K21.9 GASTROESOPHAGEAL REFLUX DISEASE WITHOUT ESOPHAGITIS: ICD-10-CM

## 2024-08-20 DIAGNOSIS — J44.9 CHRONIC OBSTRUCTIVE PULMONARY DISEASE, UNSPECIFIED COPD TYPE (HCC): ICD-10-CM

## 2024-08-20 DIAGNOSIS — F05 DELIRIUM DUE TO KNOWN PHYSIOLOGICAL CONDITION: ICD-10-CM

## 2024-08-20 PROCEDURE — 99305 1ST NF CARE MODERATE MDM 35: CPT | Performed by: INTERNAL MEDICINE

## 2024-08-20 NOTE — PROGRESS NOTES
Visit Date: 8/20/2024 - [facility]  Aureliano Falcon  1955  male 69 y.o.      Subjective:    CC: Patient presents not very communicative. He has tardive dyskinesia with abnormal movements of his head and both upper extremities.   Readmitted from [facility]  HPI: Admitted to [facility] with altered mental status  Found to have UTI/BREN and hypernatremia  Placed on iv antibiotics and iv fluids.  He gradually improved  Transitioned to po Ampicillin and po Levaquin for 7 days upon discharge    Patient is seen and examined. Medications on chart reviewed. Labs are reviewed. Discussed with nursing staff.       ROS:  Review of Systems   Reason unable to perform ROS: Patient is not very communicative. Nursing staff reports no issues.   Neurological:         Tardive dyskinetic movements ++ upper extremities.      Current Outpatient Medications   Medication Sig Dispense Refill    levoFLOXacin (LEVAQUIN) 250 MG tablet Take 1 tablet by mouth daily for 7 days      ampicillin (PRINCIPEN) 500 MG capsule Take 1 capsule by mouth in the morning and 1 capsule in the evening.      Cranberry 500 MG CAPS Take 1 capsule by mouth every morning      acetaminophen (TYLENOL) 325 MG tablet Take 2 tablets by mouth every 4 hours as needed for Pain or Fever      magnesium hydroxide (MILK OF MAGNESIA) 400 MG/5ML suspension Take 30 mLs by mouth daily as needed for Constipation      bisacodyl (DULCOLAX) 10 MG suppository Place 1 suppository rectally daily as needed for Constipation      sodium phosphate (FLEET) 7-19 GM/118ML Place 1 enema rectally once as needed      aluminum & magnesium hydroxide-simethicone (MAALOX) 200-200-20 MG/5ML SUSP suspension Take 15 mLs by mouth every 4 hours as needed for Indigestion      loperamide (IMODIUM) 2 MG capsule Take 1 capsule by mouth 4 times daily as needed for Diarrhea Give 2 capsule by mouth as needed for Loose Stools May give 4 mg once, followed by 2 mg thereafter AND Give 1 capsule by mouth as needed for Loose Stools Do not

## 2024-09-17 ENCOUNTER — OUTSIDE SERVICES (OUTPATIENT)
Dept: INTERNAL MEDICINE CLINIC | Age: 69
End: 2024-09-17
Payer: OTHER GOVERNMENT

## 2024-09-17 DIAGNOSIS — E11.9 TYPE 2 DIABETES MELLITUS WITHOUT COMPLICATION, UNSPECIFIED WHETHER LONG TERM INSULIN USE (HCC): ICD-10-CM

## 2024-09-17 DIAGNOSIS — K21.9 GASTROESOPHAGEAL REFLUX DISEASE WITHOUT ESOPHAGITIS: ICD-10-CM

## 2024-09-17 DIAGNOSIS — J44.9 CHRONIC OBSTRUCTIVE PULMONARY DISEASE, UNSPECIFIED COPD TYPE (HCC): ICD-10-CM

## 2024-09-17 DIAGNOSIS — R13.10 DYSPHAGIA, UNSPECIFIED TYPE: ICD-10-CM

## 2024-09-17 DIAGNOSIS — G93.41 METABOLIC ENCEPHALOPATHY: ICD-10-CM

## 2024-09-17 DIAGNOSIS — N13.9 OBSTRUCTIVE AND REFLUX UROPATHY, UNSPECIFIED: ICD-10-CM

## 2024-09-17 DIAGNOSIS — F20.0 PARANOID SCHIZOPHRENIA (HCC): Primary | ICD-10-CM

## 2024-09-17 PROCEDURE — 99308 SBSQ NF CARE LOW MDM 20: CPT | Performed by: INTERNAL MEDICINE

## 2024-10-08 ENCOUNTER — OUTSIDE SERVICES (OUTPATIENT)
Dept: INTERNAL MEDICINE CLINIC | Age: 69
End: 2024-10-08
Payer: MEDICARE

## 2024-10-08 DIAGNOSIS — F20.0 PARANOID SCHIZOPHRENIA (HCC): Primary | ICD-10-CM

## 2024-10-08 DIAGNOSIS — R13.10 DYSPHAGIA, UNSPECIFIED TYPE: ICD-10-CM

## 2024-10-08 DIAGNOSIS — N13.9 OBSTRUCTIVE AND REFLUX UROPATHY, UNSPECIFIED: ICD-10-CM

## 2024-10-08 DIAGNOSIS — J44.9 CHRONIC OBSTRUCTIVE PULMONARY DISEASE, UNSPECIFIED COPD TYPE (HCC): ICD-10-CM

## 2024-10-08 DIAGNOSIS — K21.9 GASTROESOPHAGEAL REFLUX DISEASE WITHOUT ESOPHAGITIS: ICD-10-CM

## 2024-10-08 DIAGNOSIS — E11.9 TYPE 2 DIABETES MELLITUS WITHOUT COMPLICATION, UNSPECIFIED WHETHER LONG TERM INSULIN USE (HCC): ICD-10-CM

## 2024-10-08 PROCEDURE — 99308 SBSQ NF CARE LOW MDM 20: CPT | Performed by: INTERNAL MEDICINE

## 2024-10-08 NOTE — PROGRESS NOTES
Visit Date: 10/8/2024 - [facility]  Aureliano Falcon  1955  male 69 y.o.      Subjective:    CC: Patient presents not very communicative. He has tardive dyskinesia with abnormal movements of his head and both upper extremities.     HPI: No recent changes since last visit    Patient is seen and examined. Medications on chart reviewed. Labs are reviewed. Discussed with nursing staff.       ROS:  Review of Systems   Reason unable to perform ROS: Patient is not very communicative. Nursing staff reports no issues.   Neurological:         Tardive dyskinetic movements ++ upper extremities.      Current Outpatient Medications   Medication Sig Dispense Refill    ampicillin (PRINCIPEN) 500 MG capsule Take 1 capsule by mouth in the morning and 1 capsule in the evening.      Cranberry 500 MG CAPS Take 1 capsule by mouth every morning      acetaminophen (TYLENOL) 325 MG tablet Take 2 tablets by mouth every 4 hours as needed for Pain or Fever      magnesium hydroxide (MILK OF MAGNESIA) 400 MG/5ML suspension Take 30 mLs by mouth daily as needed for Constipation      bisacodyl (DULCOLAX) 10 MG suppository Place 1 suppository rectally daily as needed for Constipation      sodium phosphate (FLEET) 7-19 GM/118ML Place 1 enema rectally once as needed      aluminum & magnesium hydroxide-simethicone (MAALOX) 200-200-20 MG/5ML SUSP suspension Take 15 mLs by mouth every 4 hours as needed for Indigestion      loperamide (IMODIUM) 2 MG capsule Take 1 capsule by mouth 4 times daily as needed for Diarrhea Give 2 capsule by mouth as needed for Loose Stools May give 4 mg once, followed by 2 mg thereafter AND Give 1 capsule by mouth as needed for Loose Stools Do not exceed 8 mg/12 hours      tamsulosin (FLOMAX) 0.4 MG capsule Take 1 capsule by mouth daily      ipratropium 0.5 mg-albuterol 2.5 mg (DUONEB) 0.5-2.5 (3) MG/3ML SOLN nebulizer solution Inhale 3 mLs into the lungs every 6 hours as needed for Shortness of Breath 360 mL     ondansetron

## 2024-11-12 ENCOUNTER — OUTSIDE SERVICES (OUTPATIENT)
Dept: INTERNAL MEDICINE CLINIC | Age: 69
End: 2024-11-12

## 2024-11-12 DIAGNOSIS — J44.9 CHRONIC OBSTRUCTIVE PULMONARY DISEASE, UNSPECIFIED COPD TYPE (HCC): ICD-10-CM

## 2024-11-12 DIAGNOSIS — N13.9 OBSTRUCTIVE AND REFLUX UROPATHY, UNSPECIFIED: ICD-10-CM

## 2024-11-12 DIAGNOSIS — K21.9 GASTROESOPHAGEAL REFLUX DISEASE WITHOUT ESOPHAGITIS: ICD-10-CM

## 2024-11-12 DIAGNOSIS — R13.10 DYSPHAGIA, UNSPECIFIED TYPE: ICD-10-CM

## 2024-11-12 DIAGNOSIS — E11.9 TYPE 2 DIABETES MELLITUS WITHOUT COMPLICATION, UNSPECIFIED WHETHER LONG TERM INSULIN USE (HCC): ICD-10-CM

## 2024-11-12 DIAGNOSIS — E87.6 HYPOKALEMIA: ICD-10-CM

## 2024-11-12 DIAGNOSIS — F20.0 PARANOID SCHIZOPHRENIA (HCC): Primary | ICD-10-CM

## 2024-11-12 NOTE — PROGRESS NOTES
Visit Date: 11/12/2024 - [facility]  Aurelinao Falcon  1955  male 69 y.o.      Subjective:    CC: Patient presents not very communicative. He has tardive dyskinesia with abnormal movements of his head and both upper extremities.     HPI: No recent changes since last visit    Patient is seen and examined. Medications on chart reviewed. Labs are reviewed. Discussed with nursing staff.       ROS:  Review of Systems   Reason unable to perform ROS: Patient is not very communicative. Nursing staff reports no issues.   Neurological:         Tardive dyskinetic movements ++ upper extremities.      Current Outpatient Medications   Medication Sig Dispense Refill    ampicillin (PRINCIPEN) 500 MG capsule Take 1 capsule by mouth in the morning and 1 capsule in the evening.      Cranberry 500 MG CAPS Take 1 capsule by mouth every morning      acetaminophen (TYLENOL) 325 MG tablet Take 2 tablets by mouth every 4 hours as needed for Pain or Fever      magnesium hydroxide (MILK OF MAGNESIA) 400 MG/5ML suspension Take 30 mLs by mouth daily as needed for Constipation      bisacodyl (DULCOLAX) 10 MG suppository Place 1 suppository rectally daily as needed for Constipation      sodium phosphate (FLEET) 7-19 GM/118ML Place 1 enema rectally once as needed      aluminum & magnesium hydroxide-simethicone (MAALOX) 200-200-20 MG/5ML SUSP suspension Take 15 mLs by mouth every 4 hours as needed for Indigestion      loperamide (IMODIUM) 2 MG capsule Take 1 capsule by mouth 4 times daily as needed for Diarrhea Give 2 capsule by mouth as needed for Loose Stools May give 4 mg once, followed by 2 mg thereafter AND Give 1 capsule by mouth as needed for Loose Stools Do not exceed 8 mg/12 hours      tamsulosin (FLOMAX) 0.4 MG capsule Take 1 capsule by mouth daily      ipratropium 0.5 mg-albuterol 2.5 mg (DUONEB) 0.5-2.5 (3) MG/3ML SOLN nebulizer solution Inhale 3 mLs into the lungs every 6 hours as needed for Shortness of Breath 360 mL     ondansetron

## 2024-12-03 ENCOUNTER — OUTSIDE SERVICES (OUTPATIENT)
Dept: INTERNAL MEDICINE CLINIC | Age: 69
End: 2024-12-03
Payer: OTHER GOVERNMENT

## 2024-12-03 DIAGNOSIS — E11.9 TYPE 2 DIABETES MELLITUS WITHOUT COMPLICATION, UNSPECIFIED WHETHER LONG TERM INSULIN USE (HCC): ICD-10-CM

## 2024-12-03 DIAGNOSIS — F05 DELIRIUM DUE TO KNOWN PHYSIOLOGICAL CONDITION: ICD-10-CM

## 2024-12-03 DIAGNOSIS — F20.0 PARANOID SCHIZOPHRENIA (HCC): Primary | ICD-10-CM

## 2024-12-03 DIAGNOSIS — K21.9 GASTROESOPHAGEAL REFLUX DISEASE WITHOUT ESOPHAGITIS: ICD-10-CM

## 2024-12-03 DIAGNOSIS — N13.9 OBSTRUCTIVE AND REFLUX UROPATHY, UNSPECIFIED: ICD-10-CM

## 2024-12-03 DIAGNOSIS — R13.10 DYSPHAGIA, UNSPECIFIED TYPE: ICD-10-CM

## 2024-12-03 DIAGNOSIS — J44.9 CHRONIC OBSTRUCTIVE PULMONARY DISEASE, UNSPECIFIED COPD TYPE (HCC): ICD-10-CM

## 2024-12-03 PROCEDURE — 99308 SBSQ NF CARE LOW MDM 20: CPT | Performed by: INTERNAL MEDICINE

## 2024-12-03 NOTE — PROGRESS NOTES
Visit Date: 12/3/2024 - [facility]  Aureliano Falcon  1955  male 69 y.o.      Subjective:    CC: Patient presents not very communicative. He has tardive dyskinesia with abnormal movements of his head and both upper extremities.     HPI: No recent changes since last visit    Patient is seen and examined. Medications on chart reviewed. Labs are reviewed. Discussed with nursing staff.       ROS:  Review of Systems   Reason unable to perform ROS: Patient is not very communicative. Nursing staff reports no issues.   Neurological:         Tardive dyskinetic movements ++ upper extremities.      Current Outpatient Medications   Medication Sig Dispense Refill    ampicillin (PRINCIPEN) 500 MG capsule Take 1 capsule by mouth in the morning and 1 capsule in the evening.      Cranberry 500 MG CAPS Take 1 capsule by mouth every morning      acetaminophen (TYLENOL) 325 MG tablet Take 2 tablets by mouth every 4 hours as needed for Pain or Fever      magnesium hydroxide (MILK OF MAGNESIA) 400 MG/5ML suspension Take 30 mLs by mouth daily as needed for Constipation      bisacodyl (DULCOLAX) 10 MG suppository Place 1 suppository rectally daily as needed for Constipation      sodium phosphate (FLEET) 7-19 GM/118ML Place 1 enema rectally once as needed      aluminum & magnesium hydroxide-simethicone (MAALOX) 200-200-20 MG/5ML SUSP suspension Take 15 mLs by mouth every 4 hours as needed for Indigestion      loperamide (IMODIUM) 2 MG capsule Take 1 capsule by mouth 4 times daily as needed for Diarrhea Give 2 capsule by mouth as needed for Loose Stools May give 4 mg once, followed by 2 mg thereafter AND Give 1 capsule by mouth as needed for Loose Stools Do not exceed 8 mg/12 hours      tamsulosin (FLOMAX) 0.4 MG capsule Take 1 capsule by mouth daily      ipratropium 0.5 mg-albuterol 2.5 mg (DUONEB) 0.5-2.5 (3) MG/3ML SOLN nebulizer solution Inhale 3 mLs into the lungs every 6 hours as needed for Shortness of Breath 360 mL     ondansetron

## 2025-01-14 ENCOUNTER — OUTSIDE SERVICES (OUTPATIENT)
Dept: INTERNAL MEDICINE CLINIC | Age: 70
End: 2025-01-14
Payer: MEDICARE

## 2025-01-14 DIAGNOSIS — N13.9 OBSTRUCTIVE AND REFLUX UROPATHY, UNSPECIFIED: ICD-10-CM

## 2025-01-14 DIAGNOSIS — R13.10 DYSPHAGIA, UNSPECIFIED TYPE: ICD-10-CM

## 2025-01-14 DIAGNOSIS — F20.0 PARANOID SCHIZOPHRENIA (HCC): Primary | ICD-10-CM

## 2025-01-14 DIAGNOSIS — K21.9 GASTROESOPHAGEAL REFLUX DISEASE WITHOUT ESOPHAGITIS: ICD-10-CM

## 2025-01-14 DIAGNOSIS — J44.9 CHRONIC OBSTRUCTIVE PULMONARY DISEASE, UNSPECIFIED COPD TYPE (HCC): ICD-10-CM

## 2025-01-14 DIAGNOSIS — F05 DELIRIUM DUE TO KNOWN PHYSIOLOGICAL CONDITION: ICD-10-CM

## 2025-01-14 DIAGNOSIS — E11.9 TYPE 2 DIABETES MELLITUS WITHOUT COMPLICATION, UNSPECIFIED WHETHER LONG TERM INSULIN USE (HCC): ICD-10-CM

## 2025-01-14 PROCEDURE — 99308 SBSQ NF CARE LOW MDM 20: CPT | Performed by: INTERNAL MEDICINE

## 2025-01-14 NOTE — PROGRESS NOTES
MG disintegrating tablet Take 1 tablet by mouth every 8 hours as needed for Nausea or Vomiting      metoclopramide (REGLAN) 10 MG tablet Take 1 tablet by mouth 4 times daily as needed (nausea - before meals and at bedtime) 120 tablet 3    pantoprazole (PROTONIX) 40 MG tablet Take 1 tablet by mouth 2 times daily (before meals) 30 tablet 3    clonazePAM (KLONOPIN) 0.5 MG tablet Take 1 tablet by mouth See Admin Instructions. Indications: Panic Disorder Take 1 tablet (0.5 mg) by mouth every morning, then 1/2 tablet (0.25 mg) at 2pm and 3 tablets (1.5 mg) at bedtime for panic disorder.      benztropine (COGENTIN) 2 MG tablet Take 1 tablet by mouth 2 times daily One in morning one at night      cloZAPine (CLOZARIL) 100 MG tablet Take 1 tablet by mouth See Admin Instructions Take 1 tab (100 mg) by mouth every morning and then 2 tablets by mouth (200 mg) at bedtime.       No current facility-administered medications for this visit.       Current Meds: Refer to nursing home record    PMH:    Medical Problems: reviewed and updated       Diagnosis Date    Smoker 01/08/2024    4 cartons per month        Surgical Hx: reviewed and updated   Past Surgical History:   Procedure Laterality Date    UPPER GASTROINTESTINAL ENDOSCOPY N/A 1/19/2024    EGD BIOPSY performed by Temo Barnett MD at [facility] ENDOSCOPY        FH: reviewed and updated   No family history on file.     SH: reviewed and updated    reports that he has been smoking cigarettes. He started smoking about 45 years ago. He has a 45 pack-year smoking history. He has never been exposed to tobacco smoke. He has never used smokeless tobacco. Alcohol use questions deferred to the physician. Drug use questions deferred to the physician.     Objective:  Vitals reviewed, please refer to Nursing home chart     Physical Exam  Constitutional:       General: He is awake. He is not in acute distress.     Appearance: Normal appearance.   HENT:      Head: Normocephalic and atraumatic.

## 2025-03-11 ENCOUNTER — OUTSIDE SERVICES (OUTPATIENT)
Dept: INTERNAL MEDICINE CLINIC | Age: 70
End: 2025-03-11
Payer: MEDICARE

## 2025-03-11 DIAGNOSIS — K21.9 GASTROESOPHAGEAL REFLUX DISEASE WITHOUT ESOPHAGITIS: ICD-10-CM

## 2025-03-11 DIAGNOSIS — F05 DELIRIUM DUE TO KNOWN PHYSIOLOGICAL CONDITION: ICD-10-CM

## 2025-03-11 DIAGNOSIS — J44.9 CHRONIC OBSTRUCTIVE PULMONARY DISEASE, UNSPECIFIED COPD TYPE (HCC): ICD-10-CM

## 2025-03-11 DIAGNOSIS — E87.6 HYPOKALEMIA: ICD-10-CM

## 2025-03-11 DIAGNOSIS — E11.9 TYPE 2 DIABETES MELLITUS WITHOUT COMPLICATION, UNSPECIFIED WHETHER LONG TERM INSULIN USE (HCC): ICD-10-CM

## 2025-03-11 DIAGNOSIS — F20.0 PARANOID SCHIZOPHRENIA (HCC): Primary | ICD-10-CM

## 2025-03-11 DIAGNOSIS — N13.9 OBSTRUCTIVE AND REFLUX UROPATHY, UNSPECIFIED: ICD-10-CM

## 2025-03-11 PROCEDURE — 99308 SBSQ NF CARE LOW MDM 20: CPT | Performed by: INTERNAL MEDICINE

## 2025-03-11 NOTE — PROGRESS NOTES
Visit Date: 3/11/2025 - [facility]  Aureliano Falcon  1955  male 69 y.o.      Subjective:    CC: Patient presents not communicative. He has tardive dyskinesia with abnormal movements of his head and both upper extremities.     HPI: No changes since last visit    Patient is seen and examined. Medications on chart reviewed. Labs are reviewed. Discussed with nursing staff.       ROS:  Review of Systems   Reason unable to perform ROS: Patient is not very communicative. Nursing staff reports no issues.   Neurological:         Tardive dyskinetic movements ++ upper extremities.      Current Outpatient Medications   Medication Sig Dispense Refill    ampicillin (PRINCIPEN) 500 MG capsule Take 1 capsule by mouth in the morning and 1 capsule in the evening.      Cranberry 500 MG CAPS Take 1 capsule by mouth every morning      acetaminophen (TYLENOL) 325 MG tablet Take 2 tablets by mouth every 4 hours as needed for Pain or Fever      magnesium hydroxide (MILK OF MAGNESIA) 400 MG/5ML suspension Take 30 mLs by mouth daily as needed for Constipation      bisacodyl (DULCOLAX) 10 MG suppository Place 1 suppository rectally daily as needed for Constipation      sodium phosphate (FLEET) 7-19 GM/118ML Place 1 enema rectally once as needed      aluminum & magnesium hydroxide-simethicone (MAALOX) 200-200-20 MG/5ML SUSP suspension Take 15 mLs by mouth every 4 hours as needed for Indigestion      loperamide (IMODIUM) 2 MG capsule Take 1 capsule by mouth 4 times daily as needed for Diarrhea Give 2 capsule by mouth as needed for Loose Stools May give 4 mg once, followed by 2 mg thereafter AND Give 1 capsule by mouth as needed for Loose Stools Do not exceed 8 mg/12 hours      tamsulosin (FLOMAX) 0.4 MG capsule Take 1 capsule by mouth daily      ipratropium 0.5 mg-albuterol 2.5 mg (DUONEB) 0.5-2.5 (3) MG/3ML SOLN nebulizer solution Inhale 3 mLs into the lungs every 6 hours as needed for Shortness of Breath 360 mL     ondansetron (ZOFRAN-ODT) 4 MG

## 2025-04-08 ENCOUNTER — OUTSIDE SERVICES (OUTPATIENT)
Dept: INTERNAL MEDICINE CLINIC | Age: 70
End: 2025-04-08
Payer: MEDICARE

## 2025-04-08 DIAGNOSIS — J44.9 CHRONIC OBSTRUCTIVE PULMONARY DISEASE, UNSPECIFIED COPD TYPE (HCC): ICD-10-CM

## 2025-04-08 DIAGNOSIS — E11.9 TYPE 2 DIABETES MELLITUS WITHOUT COMPLICATION, UNSPECIFIED WHETHER LONG TERM INSULIN USE: ICD-10-CM

## 2025-04-08 DIAGNOSIS — G93.41 METABOLIC ENCEPHALOPATHY: ICD-10-CM

## 2025-04-08 DIAGNOSIS — F20.0 PARANOID SCHIZOPHRENIA (HCC): Primary | ICD-10-CM

## 2025-04-08 DIAGNOSIS — R41.82 ALTERED MENTAL STATUS, UNSPECIFIED ALTERED MENTAL STATUS TYPE: ICD-10-CM

## 2025-04-08 DIAGNOSIS — R13.10 DYSPHAGIA, UNSPECIFIED TYPE: ICD-10-CM

## 2025-04-08 DIAGNOSIS — E87.6 HYPOKALEMIA: ICD-10-CM

## 2025-04-08 DIAGNOSIS — K21.9 GASTROESOPHAGEAL REFLUX DISEASE WITHOUT ESOPHAGITIS: ICD-10-CM

## 2025-04-08 DIAGNOSIS — N13.9 OBSTRUCTIVE AND REFLUX UROPATHY, UNSPECIFIED: ICD-10-CM

## 2025-04-08 DIAGNOSIS — F05 DELIRIUM DUE TO KNOWN PHYSIOLOGICAL CONDITION: ICD-10-CM

## 2025-04-08 PROCEDURE — 99308 SBSQ NF CARE LOW MDM 20: CPT | Performed by: INTERNAL MEDICINE

## 2025-04-08 NOTE — PROGRESS NOTES
Right Ear: External ear normal.      Left Ear: External ear normal.      Nose: Nose normal.   Eyes:      Extraocular Movements: Extraocular movements intact.      Pupils: Pupils are equal, round, and reactive to light.   Cardiovascular:      Rate and Rhythm: Normal rate and regular rhythm.      Heart sounds: S1 normal and S2 normal. No murmur heard.     No friction rub. No gallop.   Pulmonary:      Breath sounds: Normal breath sounds.      Comments: Clear to ausculation bilaterally  Non-traumatic  Chest:      Chest wall: No tenderness.   Abdominal:      General: Bowel sounds are normal. There is no distension.      Palpations: Abdomen is soft. There is no mass.      Tenderness: There is no abdominal tenderness.      Comments: No organomegaly   Genitourinary:     Comments: Aldana catheter present  Musculoskeletal:         General: No tenderness. Normal range of motion.      Cervical back: Neck supple. No tenderness.      Right lower leg: No edema.      Left lower leg: No edema.      Comments: No clubbing, No cyanosis   Skin:     General: Skin is warm and dry.   Neurological:      General: No focal deficit present.      Mental Status: He is alert.      Motor: Weakness present.      Gait: Gait abnormal.         Assessment & Plan:  1. Paranoid schizophrenia ([facility])  Continue current psych meds  2. Dysphagia, unspecified type  On tube feeds, tolerated .  Continue same.  3. Gastroesophageal reflux disease without esophagitis  4. Chronic obstructive pulmonary disease, unspecified COPD type ([facility])  5. Obstructive and reflux uropathy, unspecified  Aldana catheter in place, clear urine  6. Type 2 diabetes mellitus without complication, unspecified whether long term insulin use ([facility])  Continue current meds    Continue current management,    Bahaa A Awadalla, MD

## 2025-06-10 ENCOUNTER — OUTSIDE SERVICES (OUTPATIENT)
Dept: INTERNAL MEDICINE CLINIC | Age: 70
End: 2025-06-10
Payer: MEDICARE

## 2025-06-10 DIAGNOSIS — J44.9 CHRONIC OBSTRUCTIVE PULMONARY DISEASE, UNSPECIFIED COPD TYPE (HCC): ICD-10-CM

## 2025-06-10 DIAGNOSIS — F20.0 PARANOID SCHIZOPHRENIA (HCC): Primary | ICD-10-CM

## 2025-06-10 DIAGNOSIS — E11.9 TYPE 2 DIABETES MELLITUS WITHOUT COMPLICATION, UNSPECIFIED WHETHER LONG TERM INSULIN USE (HCC): ICD-10-CM

## 2025-06-10 DIAGNOSIS — R41.82 ALTERED MENTAL STATUS, UNSPECIFIED ALTERED MENTAL STATUS TYPE: ICD-10-CM

## 2025-06-10 DIAGNOSIS — N13.9 OBSTRUCTIVE AND REFLUX UROPATHY, UNSPECIFIED: ICD-10-CM

## 2025-06-10 DIAGNOSIS — G93.41 METABOLIC ENCEPHALOPATHY: ICD-10-CM

## 2025-06-10 DIAGNOSIS — F05 DELIRIUM DUE TO KNOWN PHYSIOLOGICAL CONDITION: ICD-10-CM

## 2025-06-10 DIAGNOSIS — E87.6 HYPOKALEMIA: ICD-10-CM

## 2025-06-10 DIAGNOSIS — R13.10 DYSPHAGIA, UNSPECIFIED TYPE: ICD-10-CM

## 2025-06-10 DIAGNOSIS — K21.9 GASTROESOPHAGEAL REFLUX DISEASE WITHOUT ESOPHAGITIS: ICD-10-CM

## 2025-06-10 PROCEDURE — 99308 SBSQ NF CARE LOW MDM 20: CPT | Performed by: INTERNAL MEDICINE

## 2025-06-10 NOTE — PROGRESS NOTES
and atraumatic.      Right Ear: External ear normal.      Left Ear: External ear normal.      Nose: Nose normal.   Eyes:      Extraocular Movements: Extraocular movements intact.      Pupils: Pupils are equal, round, and reactive to light.   Cardiovascular:      Rate and Rhythm: Normal rate and regular rhythm.      Heart sounds: S1 normal and S2 normal. No murmur heard.     No friction rub. No gallop.   Pulmonary:      Breath sounds: Normal breath sounds.      Comments: Clear to ausculation bilaterally  Non-traumatic  Chest:      Chest wall: No tenderness.   Abdominal:      General: Bowel sounds are normal. There is no distension.      Palpations: Abdomen is soft. There is no mass.      Tenderness: There is no abdominal tenderness.      Comments: No organomegaly   Genitourinary:     Comments: Aldana catheter present  Musculoskeletal:         General: No tenderness. Normal range of motion.      Cervical back: Neck supple. No tenderness.      Right lower leg: No edema.      Left lower leg: No edema.      Comments: No clubbing, No cyanosis   Skin:     General: Skin is warm and dry.   Neurological:      General: No focal deficit present.      Mental Status: He is alert.      Motor: Weakness present.      Gait: Gait abnormal.         Assessment & Plan:  1. Paranoid schizophrenia ([facility])  Continue current psych meds  2. Dysphagia, unspecified type  On tube feeds, tolerated .  Continue same.  3. Gastroesophageal reflux disease without esophagitis  4. Chronic obstructive pulmonary disease, unspecified COPD type ([facility])  5. Obstructive and reflux uropathy, unspecified  Aldana catheter in place, clear urine  6. Type 2 diabetes mellitus without complication, unspecified whether long term insulin use ([facility])  Continue current meds    Continue current management,    Bahaa A Awadalla, MD

## 2025-07-15 ENCOUNTER — OUTSIDE SERVICES (OUTPATIENT)
Dept: INTERNAL MEDICINE CLINIC | Age: 70
End: 2025-07-15
Payer: MEDICARE

## 2025-07-15 DIAGNOSIS — F20.0 PARANOID SCHIZOPHRENIA (HCC): Primary | ICD-10-CM

## 2025-07-15 DIAGNOSIS — K21.9 GASTROESOPHAGEAL REFLUX DISEASE WITHOUT ESOPHAGITIS: ICD-10-CM

## 2025-07-15 DIAGNOSIS — R13.10 DYSPHAGIA, UNSPECIFIED TYPE: ICD-10-CM

## 2025-07-15 DIAGNOSIS — E87.6 HYPOKALEMIA: ICD-10-CM

## 2025-07-15 DIAGNOSIS — N13.9 OBSTRUCTIVE AND REFLUX UROPATHY, UNSPECIFIED: ICD-10-CM

## 2025-07-15 DIAGNOSIS — E11.9 TYPE 2 DIABETES MELLITUS WITHOUT COMPLICATION, UNSPECIFIED WHETHER LONG TERM INSULIN USE (HCC): ICD-10-CM

## 2025-07-15 DIAGNOSIS — G93.41 METABOLIC ENCEPHALOPATHY: ICD-10-CM

## 2025-07-15 DIAGNOSIS — J44.9 CHRONIC OBSTRUCTIVE PULMONARY DISEASE, UNSPECIFIED COPD TYPE (HCC): ICD-10-CM

## 2025-07-15 DIAGNOSIS — F05 DELIRIUM DUE TO KNOWN PHYSIOLOGICAL CONDITION: ICD-10-CM

## 2025-07-15 PROCEDURE — 99308 SBSQ NF CARE LOW MDM 20: CPT | Performed by: INTERNAL MEDICINE

## 2025-08-12 ENCOUNTER — OUTSIDE SERVICES (OUTPATIENT)
Dept: INTERNAL MEDICINE CLINIC | Age: 70
End: 2025-08-12

## 2025-08-12 DIAGNOSIS — R13.10 DYSPHAGIA, UNSPECIFIED TYPE: ICD-10-CM

## 2025-08-12 DIAGNOSIS — E87.6 HYPOKALEMIA: ICD-10-CM

## 2025-08-12 DIAGNOSIS — N13.9 OBSTRUCTIVE AND REFLUX UROPATHY, UNSPECIFIED: ICD-10-CM

## 2025-08-12 DIAGNOSIS — E11.9 TYPE 2 DIABETES MELLITUS WITHOUT COMPLICATION, UNSPECIFIED WHETHER LONG TERM INSULIN USE (HCC): ICD-10-CM

## 2025-08-12 DIAGNOSIS — F20.0 PARANOID SCHIZOPHRENIA (HCC): Primary | ICD-10-CM

## 2025-08-12 DIAGNOSIS — F05 DELIRIUM DUE TO KNOWN PHYSIOLOGICAL CONDITION: ICD-10-CM

## 2025-08-12 DIAGNOSIS — G93.41 METABOLIC ENCEPHALOPATHY: ICD-10-CM

## 2025-08-12 DIAGNOSIS — K21.9 GASTROESOPHAGEAL REFLUX DISEASE WITHOUT ESOPHAGITIS: ICD-10-CM

## 2025-08-12 DIAGNOSIS — J44.9 CHRONIC OBSTRUCTIVE PULMONARY DISEASE, UNSPECIFIED COPD TYPE (HCC): ICD-10-CM

## 2025-08-12 DIAGNOSIS — R41.82 ALTERED MENTAL STATUS, UNSPECIFIED ALTERED MENTAL STATUS TYPE: ICD-10-CM

## 2025-08-12 PROCEDURE — 99308 SBSQ NF CARE LOW MDM 20: CPT | Performed by: INTERNAL MEDICINE

## (undated) DEVICE — Device: Brand: DEFENDO VALVE AND CONNECTOR KIT

## (undated) DEVICE — TUBING, SUCTION, 1/4" X 10', STRAIGHT: Brand: MEDLINE

## (undated) DEVICE — GOWN ISOLATN XL BLU POLYPR OVR HD OPN BK KNIT CUF PROTCT

## (undated) DEVICE — AIR/WATER CLEANING ADAPTER FOR OLYMPUS® GI ENDOSCOPE: Brand: BULLDOG®

## (undated) DEVICE — JELLY,LUBE,STERILE,FLIP TOP,TUBE,4-OZ: Brand: MEDLINE

## (undated) DEVICE — BAG SPECIMEN BIOHAZARD 6IN X 9IN

## (undated) DEVICE — KENDALL 450 SERIES MONITORING FOAM ELECTRODE - RECTANGULAR SHAPE ( 3/PK): Brand: KENDALL

## (undated) DEVICE — SPONGE GZ 4IN 4IN 4 PLY N WVN AVANT

## (undated) DEVICE — WIPES SKIN CLOTH READYPREP 9 X 10.5 IN 2% CHLORHEX GLUCONATE CHG PREOP

## (undated) DEVICE — YANKAUER,BULB TIP,W/O VENT,RIGID,STERILE: Brand: MEDLINE

## (undated) DEVICE — CONTAINER SPEC COLL 960ML POLYPR TRIANG GRAD INTAKE/OUTPUT

## (undated) DEVICE — KIT BEDSIDE REVITAL OX 500ML

## (undated) DEVICE — FORCEPS BX L240CM JAW DIA2.8MM L CAP W/ NDL MIC MESH TOOTH

## (undated) DEVICE — BLOCK BITE 60FR CAREGUARD

## (undated) DEVICE — MASK,FACE,MAXFLUIDPROTECT,SHIELD/ERLPS: Brand: MEDLINE

## (undated) DEVICE — 4-PORT MANIFOLD: Brand: NEPTUNE 2